# Patient Record
Sex: MALE | Race: WHITE | Employment: OTHER | URBAN - METROPOLITAN AREA
[De-identification: names, ages, dates, MRNs, and addresses within clinical notes are randomized per-mention and may not be internally consistent; named-entity substitution may affect disease eponyms.]

---

## 2017-03-27 ENCOUNTER — HOSPITAL ENCOUNTER (OUTPATIENT)
Dept: GENERAL RADIOLOGY | Age: 62
Discharge: HOME | End: 2017-03-27
Payer: COMMERCIAL

## 2017-03-27 DIAGNOSIS — J98.4 RESTRICTIVE LUNG DISEASE: ICD-10-CM

## 2017-03-27 PROCEDURE — 71020 XR CHEST PA LAT: CPT

## 2018-04-12 ENCOUNTER — APPOINTMENT (OUTPATIENT)
Dept: CT IMAGING | Age: 63
End: 2018-04-12
Attending: STUDENT IN AN ORGANIZED HEALTH CARE EDUCATION/TRAINING PROGRAM
Payer: COMMERCIAL

## 2018-04-12 ENCOUNTER — HOSPITAL ENCOUNTER (EMERGENCY)
Age: 63
Discharge: HOME | End: 2018-04-12
Attending: STUDENT IN AN ORGANIZED HEALTH CARE EDUCATION/TRAINING PROGRAM
Payer: COMMERCIAL

## 2018-04-12 ENCOUNTER — APPOINTMENT (OUTPATIENT)
Dept: GENERAL RADIOLOGY | Age: 63
End: 2018-04-12
Attending: STUDENT IN AN ORGANIZED HEALTH CARE EDUCATION/TRAINING PROGRAM
Payer: COMMERCIAL

## 2018-04-12 VITALS
HEIGHT: 73 IN | RESPIRATION RATE: 17 BRPM | BODY MASS INDEX: 32.6 KG/M2 | TEMPERATURE: 97.8 F | WEIGHT: 246 LBS | SYSTOLIC BLOOD PRESSURE: 142 MMHG | HEART RATE: 69 BPM | DIASTOLIC BLOOD PRESSURE: 76 MMHG | OXYGEN SATURATION: 94 %

## 2018-04-12 DIAGNOSIS — R07.89 ATYPICAL CHEST PAIN: Primary | ICD-10-CM

## 2018-04-12 LAB
ALBUMIN SERPL-MCNC: 3.9 G/DL (ref 3.2–4.6)
ALBUMIN/GLOB SERPL: 1.2 {RATIO} (ref 1.2–3.5)
ALP SERPL-CCNC: 71 U/L (ref 50–136)
ALT SERPL-CCNC: 31 U/L (ref 12–65)
ANION GAP SERPL CALC-SCNC: 8 MMOL/L (ref 7–16)
AST SERPL-CCNC: 18 U/L (ref 15–37)
ATRIAL RATE: 69 BPM
ATRIAL RATE: 72 BPM
BASOPHILS # BLD: 0 K/UL (ref 0–0.2)
BASOPHILS NFR BLD: 0 % (ref 0–2)
BILIRUB SERPL-MCNC: 0.8 MG/DL (ref 0.2–1.1)
BNP SERPL-MCNC: 24 PG/ML
BUN SERPL-MCNC: 19 MG/DL (ref 8–23)
CALCIUM SERPL-MCNC: 8.4 MG/DL (ref 8.3–10.4)
CALCULATED P AXIS, ECG09: 58 DEGREES
CALCULATED P AXIS, ECG09: 68 DEGREES
CALCULATED R AXIS, ECG10: -18 DEGREES
CALCULATED R AXIS, ECG10: -5 DEGREES
CALCULATED T AXIS, ECG11: 45 DEGREES
CALCULATED T AXIS, ECG11: 50 DEGREES
CHLORIDE SERPL-SCNC: 106 MMOL/L (ref 98–107)
CO2 SERPL-SCNC: 25 MMOL/L (ref 21–32)
CREAT SERPL-MCNC: 1.16 MG/DL (ref 0.8–1.5)
D DIMER PPP FEU-MCNC: 0.56 UG/ML(FEU)
DIAGNOSIS, 93000: NORMAL
DIAGNOSIS, 93000: NORMAL
DIFFERENTIAL METHOD BLD: ABNORMAL
EOSINOPHIL # BLD: 0.2 K/UL (ref 0–0.8)
EOSINOPHIL NFR BLD: 3 % (ref 0.5–7.8)
ERYTHROCYTE [DISTWIDTH] IN BLOOD BY AUTOMATED COUNT: 13.6 % (ref 11.9–14.6)
GLOBULIN SER CALC-MCNC: 3.3 G/DL (ref 2.3–3.5)
GLUCOSE SERPL-MCNC: 149 MG/DL (ref 65–100)
HCT VFR BLD AUTO: 49 % (ref 41.1–50.3)
HGB BLD-MCNC: 17 G/DL (ref 13.6–17.2)
IMM GRANULOCYTES # BLD: 0 K/UL (ref 0–0.5)
IMM GRANULOCYTES NFR BLD AUTO: 0 % (ref 0–5)
LYMPHOCYTES # BLD: 2.6 K/UL (ref 0.5–4.6)
LYMPHOCYTES NFR BLD: 39 % (ref 13–44)
MCH RBC QN AUTO: 30.9 PG (ref 26.1–32.9)
MCHC RBC AUTO-ENTMCNC: 34.7 G/DL (ref 31.4–35)
MCV RBC AUTO: 89.1 FL (ref 79.6–97.8)
MONOCYTES # BLD: 0.4 K/UL (ref 0.1–1.3)
MONOCYTES NFR BLD: 6 % (ref 4–12)
NEUTS SEG # BLD: 3.4 K/UL (ref 1.7–8.2)
NEUTS SEG NFR BLD: 52 % (ref 43–78)
P-R INTERVAL, ECG05: 192 MS
P-R INTERVAL, ECG05: 208 MS
PLATELET # BLD AUTO: 202 K/UL (ref 150–450)
PMV BLD AUTO: 9.7 FL (ref 10.8–14.1)
POTASSIUM SERPL-SCNC: 4.1 MMOL/L (ref 3.5–5.1)
PROT SERPL-MCNC: 7.2 G/DL (ref 6.3–8.2)
Q-T INTERVAL, ECG07: 402 MS
Q-T INTERVAL, ECG07: 414 MS
QRS DURATION, ECG06: 102 MS
QRS DURATION, ECG06: 88 MS
QTC CALCULATION (BEZET), ECG08: 440 MS
QTC CALCULATION (BEZET), ECG08: 443 MS
RBC # BLD AUTO: 5.5 M/UL (ref 4.23–5.67)
SODIUM SERPL-SCNC: 139 MMOL/L (ref 136–145)
TROPONIN I BLD-MCNC: 0 NG/ML (ref 0.02–0.05)
TROPONIN I BLD-MCNC: 0 NG/ML (ref 0.02–0.05)
VENTRICULAR RATE, ECG03: 69 BPM
VENTRICULAR RATE, ECG03: 72 BPM
WBC # BLD AUTO: 6.7 K/UL (ref 4.3–11.1)

## 2018-04-12 PROCEDURE — 71046 X-RAY EXAM CHEST 2 VIEWS: CPT

## 2018-04-12 PROCEDURE — 83880 ASSAY OF NATRIURETIC PEPTIDE: CPT | Performed by: STUDENT IN AN ORGANIZED HEALTH CARE EDUCATION/TRAINING PROGRAM

## 2018-04-12 PROCEDURE — 85379 FIBRIN DEGRADATION QUANT: CPT | Performed by: STUDENT IN AN ORGANIZED HEALTH CARE EDUCATION/TRAINING PROGRAM

## 2018-04-12 PROCEDURE — 80053 COMPREHEN METABOLIC PANEL: CPT | Performed by: STUDENT IN AN ORGANIZED HEALTH CARE EDUCATION/TRAINING PROGRAM

## 2018-04-12 PROCEDURE — 85025 COMPLETE CBC W/AUTO DIFF WBC: CPT | Performed by: STUDENT IN AN ORGANIZED HEALTH CARE EDUCATION/TRAINING PROGRAM

## 2018-04-12 PROCEDURE — 84484 ASSAY OF TROPONIN QUANT: CPT

## 2018-04-12 PROCEDURE — 93005 ELECTROCARDIOGRAM TRACING: CPT | Performed by: STUDENT IN AN ORGANIZED HEALTH CARE EDUCATION/TRAINING PROGRAM

## 2018-04-12 PROCEDURE — 74011250637 HC RX REV CODE- 250/637: Performed by: STUDENT IN AN ORGANIZED HEALTH CARE EDUCATION/TRAINING PROGRAM

## 2018-04-12 PROCEDURE — 99285 EMERGENCY DEPT VISIT HI MDM: CPT | Performed by: STUDENT IN AN ORGANIZED HEALTH CARE EDUCATION/TRAINING PROGRAM

## 2018-04-12 RX ORDER — GUAIFENESIN 100 MG/5ML
324 LIQUID (ML) ORAL
Status: COMPLETED | OUTPATIENT
Start: 2018-04-12 | End: 2018-04-12

## 2018-04-12 RX ADMIN — ASPIRIN 81 MG 324 MG: 81 TABLET ORAL at 10:02

## 2018-04-12 NOTE — ED PROVIDER NOTES
HPI Comments: 56-year-old male patient presents from a primary care physician's office with reports of substernal chest pain that started this morning. Patient states he was lying in bed when he noted a dull aching pain at the substernal region of his chest, nonradiating and lasting approximately 30 minutes. Patient states this pain resolved on its own. He reports associated shortness of breath that has been present for several weeks. Patient states his shortness of breath is worsened with exertion. He denies nausea, vomiting or diaphoresis. Patient denies similar symptoms in the past.  He reports a history of \"borderline diabetes\". He denies history of hypertension, hyperlipidemia, previously diagnosed cardiac disease or tobacco use. Primary care provider states EKG performed office was unremarkable. Patient is pain-free at this time and during his previous evaluation this morning. Patient is a 57 y.o. male presenting with chest pain. The history is provided by the patient. Chest Pain    This is a new problem. The current episode started 3 to 5 hours ago. The problem has been resolved. The problem occurs constantly. The pain is associated with normal activity. The pain is present in the substernal region. The pain is mild. The quality of the pain is described as pressure-like. The pain does not radiate. Associated symptoms include shortness of breath. Pertinent negatives include no abdominal pain, no back pain, no cough, no diaphoresis, no dizziness, no fever, no headaches, no nausea, no numbness, no vomiting and no weakness. He has tried nothing for the symptoms. Risk factors include male gender and diabetes mellitus.  Procedural history includes exercise treadmill test.       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 Social History Main Topics    Smoking status: Former Smoker     Packs/day: 0.50     Years: 30.00     Types: Cigarettes     Quit date: 1993    Smokeless tobacco: Former User    Alcohol use No    Drug use: No    Sexual activity: Not on file     Other Topics Concern    Not on file     Social History Narrative         ALLERGIES: Review of patient's allergies indicates no known allergies. Review of Systems   Constitutional: Negative for chills, diaphoresis and fever. HENT: Negative for congestion, sneezing and sore throat. Eyes: Negative for visual disturbance. Respiratory: Positive for shortness of breath. Negative for cough, chest tightness and wheezing. Cardiovascular: Positive for chest pain. Negative for leg swelling. Gastrointestinal: Negative for abdominal pain, blood in stool, diarrhea, nausea and vomiting. Endocrine: Negative for polyuria. Genitourinary: Negative for difficulty urinating, dysuria, flank pain, hematuria and urgency. Musculoskeletal: Negative for back pain, myalgias, neck pain and neck stiffness. Skin: Negative for color change and rash. Neurological: Negative for dizziness, syncope, speech difficulty, weakness, light-headedness, numbness and headaches. Psychiatric/Behavioral: Negative for behavioral problems. All other systems reviewed and are negative.       Vitals:    04/12/18 0943   BP: 152/82   Pulse: 72   Resp: 18   Temp: 97.8 °F (36.6 °C)   SpO2: 96%   Weight: 111.6 kg (246 lb)   Height: 6' 1\" (1.854 m) Physical Exam   Constitutional: He is oriented to person, place, and time. He appears well-developed and well-nourished. No distress. Alert and oriented to person, place and time. No acute distress. Speaks in clear, fluent sentences. HENT:   Head: Normocephalic and atraumatic. Right Ear: External ear normal.   Left Ear: External ear normal.   Nose: Nose normal.   Mouth/Throat: Oropharynx is clear and moist.   Eyes: Conjunctivae and EOM are normal. Pupils are equal, round, and reactive to light. Neck: Normal range of motion. Neck supple. No JVD present. No tracheal deviation present. Cardiovascular: Normal rate, regular rhythm, S1 normal, S2 normal, normal heart sounds and intact distal pulses. Exam reveals no gallop, no distant heart sounds and no friction rub. No murmur heard. Pulmonary/Chest: Effort normal and breath sounds normal. No accessory muscle usage or stridor. No tachypnea and no bradypnea. No respiratory distress. He has no decreased breath sounds. He has no wheezes. He has no rhonchi. He has no rales. He exhibits no tenderness. Abdominal: Soft. Normal appearance. He exhibits no distension and no mass. There is no hepatosplenomegaly, splenomegaly or hepatomegaly. There is no tenderness. There is no rigidity, no rebound, no guarding, no CVA tenderness, no tenderness at McBurney's point and negative Perkins's sign. Musculoskeletal: Normal range of motion. He exhibits no edema, tenderness or deformity. Neurological: He is alert and oriented to person, place, and time. No cranial nerve deficit. Skin: Skin is warm and dry. No rash noted. He is not diaphoretic. Psychiatric: He has a normal mood and affect. His behavior is normal.   Nursing note and vitals reviewed. MDM  Number of Diagnoses or Management Options  Diagnosis management comments: Patient is pain-free on arrival  EKG obtained on arrival shows a normal sinus rhythm with a rate of 72 beats a minute.   No evidence of acute ischemia. Low suspicion for PE, DDI of .56, adjusted DDI makes this normal level for patient, I do not feel he requires CT imaging at this time.         Amount and/or Complexity of Data Reviewed  Clinical lab tests: ordered and reviewed  Tests in the radiology section of CPT®: reviewed and ordered  Tests in the medicine section of CPT®: reviewed and ordered  Independent visualization of images, tracings, or specimens: yes    Risk of Complications, Morbidity, and/or Mortality  Presenting problems: moderate  Diagnostic procedures: low  Management options: moderate    Patient Progress  Patient progress: stable        ED Course       Procedures

## 2018-04-12 NOTE — ED NOTES
I have reviewed discharge instructions with the patient. The patient verbalized understanding. Patient left ED via Discharge Method: ambulatory to Home with self. Opportunity for questions and clarification provided. Patient given 0 scripts. To continue your aftercare when you leave the hospital, you may receive an automated call from our care team to check in on how you are doing. This is a free service and part of our promise to provide the best care and service to meet your aftercare needs.  If you have questions, or wish to unsubscribe from this service please call [phone]. Thank you for Choosing our [facility].

## 2018-04-12 NOTE — DISCHARGE INSTRUCTIONS
Chest Pain: Care Instructions  Your Care Instructions    There are many things that can cause chest pain. Some are not serious and will get better on their own in a few days. But some kinds of chest pain need more testing and treatment. Your doctor may have recommended a follow-up visit in the next 8 to 12 hours. If you are not getting better, you may need more tests or treatment. Even though your doctor has released you, you still need to watch for any problems. The doctor carefully checked you, but sometimes problems can develop later. If you have new symptoms or if your symptoms do not get better, get medical care right away. If you have worse or different chest pain or pressure that lasts more than 5 minutes or you passed out (lost consciousness), call 911 or seek other emergency help right away. A medical visit is only one step in your treatment. Even if you feel better, you still need to do what your doctor recommends, such as going to all suggested follow-up appointments and taking medicines exactly as directed. This will help you recover and help prevent future problems. How can you care for yourself at home? · Rest until you feel better. · Take your medicine exactly as prescribed. Call your doctor if you think you are having a problem with your medicine. · Do not drive after taking a prescription pain medicine. When should you call for help? Call 911 if:  ? · You passed out (lost consciousness). ? · You have severe difficulty breathing. ? · You have symptoms of a heart attack. These may include:  ¨ Chest pain or pressure, or a strange feeling in your chest.  ¨ Sweating. ¨ Shortness of breath. ¨ Nausea or vomiting. ¨ Pain, pressure, or a strange feeling in your back, neck, jaw, or upper belly or in one or both shoulders or arms. ¨ Lightheadedness or sudden weakness. ¨ A fast or irregular heartbeat.   After you call 911, the  may tell you to chew 1 adult-strength or 2 to 4 low-dose aspirin. Wait for an ambulance. Do not try to drive yourself. ?Call your doctor today if:  ? · You have any trouble breathing. ? · Your chest pain gets worse. ? · You are dizzy or lightheaded, or you feel like you may faint. ? · You are not getting better as expected. ? · You are having new or different chest pain. Where can you learn more? Go to http://gonzalo-stephan.info/. Enter A120 in the search box to learn more about \"Chest Pain: Care Instructions. \"  Current as of: March 20, 2017  Content Version: 11.4  © 3569-5003 [employer]. Care instructions adapted under license by [employer] (which disclaims liability or warranty for this information). If you have questions about a medical condition or this instruction, always ask your healthcare professional. [address] any warranty or liability for your use of this information.

## 2018-04-12 NOTE — ED TRIAGE NOTES
Pt states having pressure on his chest this morning. For the past couple of months he has been short of breath and has no energy. Pt states the pressure comes and goes. Pt was seen at his pcp today.

## 2018-04-20 ENCOUNTER — HOSPITAL ENCOUNTER (OUTPATIENT)
Dept: LAB | Age: 63
Discharge: HOME | End: 2018-04-20
Attending: INTERNAL MEDICINE
Payer: COMMERCIAL

## 2018-04-20 DIAGNOSIS — R06.09 DYSPNEA ON EXERTION: ICD-10-CM

## 2018-04-20 DIAGNOSIS — R07.2 PRECORDIAL PAIN: ICD-10-CM

## 2018-04-20 DIAGNOSIS — K21.9 GASTROESOPHAGEAL REFLUX DISEASE WITHOUT ESOPHAGITIS: ICD-10-CM

## 2018-04-20 DIAGNOSIS — E78.2 MIXED HYPERLIPIDEMIA: ICD-10-CM

## 2018-04-20 LAB
ALBUMIN SERPL-MCNC: 3.5 G/DL (ref 3.2–4.6)
ALBUMIN/GLOB SERPL: 1.1 {RATIO}
ALP SERPL-CCNC: 64 U/L (ref 50–136)
ALT SERPL-CCNC: 20 U/L (ref 12–65)
ANION GAP SERPL CALC-SCNC: 10 MMOL/L
AST SERPL-CCNC: 13 U/L (ref 15–37)
BASOPHILS # BLD: 0.1 K/UL (ref 0–0.2)
BASOPHILS NFR BLD: 1 % (ref 0–2)
BILIRUB SERPL-MCNC: 0.7 MG/DL (ref 0.2–1.1)
BUN SERPL-MCNC: 16 MG/DL (ref 8–23)
CALCIUM SERPL-MCNC: 8.1 MG/DL (ref 8.3–10.4)
CHLORIDE SERPL-SCNC: 105 MMOL/L (ref 98–107)
CHOLEST SERPL-MCNC: 159 MG/DL
CO2 SERPL-SCNC: 24 MMOL/L (ref 21–32)
CREAT SERPL-MCNC: 1.4 MG/DL (ref 0.8–1.5)
DIFFERENTIAL METHOD BLD: ABNORMAL
EOSINOPHIL # BLD: 0.2 K/UL (ref 0–0.8)
EOSINOPHIL NFR BLD: 3 % (ref 0.5–7.8)
ERYTHROCYTE [DISTWIDTH] IN BLOOD BY AUTOMATED COUNT: 13.7 % (ref 11.9–14.6)
GLOBULIN SER CALC-MCNC: 3.2 G/DL
GLUCOSE SERPL-MCNC: 260 MG/DL (ref 65–100)
HCT VFR BLD AUTO: 46.4 % (ref 41.1–50.3)
HDLC SERPL-MCNC: 21 MG/DL (ref 40–60)
HDLC SERPL: 7.6 {RATIO}
HGB BLD-MCNC: 16.3 G/DL (ref 13.6–17.2)
LDLC SERPL CALC-MCNC: 104.6 MG/DL
LIPID PROFILE,FLP: ABNORMAL
LYMPHOCYTES # BLD: 1.8 K/UL (ref 0.5–4.6)
LYMPHOCYTES NFR BLD: 30 % (ref 13–44)
MCH RBC QN AUTO: 31 PG (ref 26.1–32.9)
MCHC RBC AUTO-ENTMCNC: 35.1 G/DL (ref 31.4–35)
MCV RBC AUTO: 88.4 FL (ref 79.6–97.8)
MONOCYTES # BLD: 0.5 K/UL (ref 0.1–1.3)
MONOCYTES NFR BLD: 8 % (ref 4–12)
NEUTS SEG # BLD: 3.7 K/UL (ref 1.7–8.2)
NEUTS SEG NFR BLD: 58 % (ref 43–78)
PLATELET # BLD AUTO: 197 K/UL (ref 150–450)
PMV BLD AUTO: 9.3 FL (ref 10.8–14.1)
POTASSIUM SERPL-SCNC: 3.7 MMOL/L (ref 3.5–5.1)
PROT SERPL-MCNC: 6.7 G/DL (ref 6.3–8.2)
RBC # BLD AUTO: 5.25 M/UL (ref 4.23–5.67)
SODIUM SERPL-SCNC: 139 MMOL/L (ref 136–145)
TRIGL SERPL-MCNC: 167 MG/DL (ref 35–150)
TSH SERPL DL<=0.005 MIU/L-ACNC: 0.48 UIU/ML (ref 0.36–3.74)
VLDLC SERPL CALC-MCNC: 33.4 MG/DL (ref 6–23)
WBC # BLD AUTO: 6.2 K/UL (ref 4.3–11.1)

## 2018-04-20 PROCEDURE — 36415 COLL VENOUS BLD VENIPUNCTURE: CPT | Performed by: INTERNAL MEDICINE

## 2018-04-20 PROCEDURE — 80053 COMPREHEN METABOLIC PANEL: CPT | Performed by: INTERNAL MEDICINE

## 2018-04-20 PROCEDURE — 84443 ASSAY THYROID STIM HORMONE: CPT | Performed by: INTERNAL MEDICINE

## 2018-04-20 PROCEDURE — 80061 LIPID PANEL: CPT | Performed by: INTERNAL MEDICINE

## 2018-04-20 PROCEDURE — 85025 COMPLETE CBC W/AUTO DIFF WBC: CPT | Performed by: INTERNAL MEDICINE

## 2018-04-20 NOTE — PROGRESS NOTES
Called to pre-assess for [facility] poss with Dr Troy Lyon , Scheduled 4/24/18. No answer & message left.

## 2018-04-24 ENCOUNTER — HOSPITAL ENCOUNTER (OUTPATIENT)
Dept: CARDIAC CATH/INVASIVE PROCEDURES | Age: 63
Discharge: HOME | End: 2018-04-25
Attending: INTERNAL MEDICINE | Admitting: INTERNAL MEDICINE
Payer: COMMERCIAL

## 2018-04-24 PROBLEM — I25.10 CAD (CORONARY ARTERY DISEASE): Status: ACTIVE | Noted: 2018-04-24

## 2018-04-24 LAB
ANION GAP SERPL CALC-SCNC: 7 MMOL/L (ref 7–16)
ATRIAL RATE: 73 BPM
ATRIAL RATE: 73 BPM
BUN SERPL-MCNC: 14 MG/DL (ref 8–23)
CALCIUM SERPL-MCNC: 8.4 MG/DL (ref 8.3–10.4)
CALCULATED P AXIS, ECG09: 60 DEGREES
CALCULATED P AXIS, ECG09: 65 DEGREES
CALCULATED R AXIS, ECG10: -10 DEGREES
CALCULATED R AXIS, ECG10: 3 DEGREES
CALCULATED T AXIS, ECG11: 58 DEGREES
CALCULATED T AXIS, ECG11: 59 DEGREES
CHLORIDE SERPL-SCNC: 109 MMOL/L (ref 98–107)
CO2 SERPL-SCNC: 26 MMOL/L (ref 21–32)
CREAT SERPL-MCNC: 1.31 MG/DL (ref 0.8–1.5)
DIAGNOSIS, 93000: NORMAL
DIAGNOSIS, 93000: NORMAL
GLUCOSE SERPL-MCNC: 158 MG/DL (ref 65–100)
P-R INTERVAL, ECG05: 202 MS
P-R INTERVAL, ECG05: 202 MS
POTASSIUM SERPL-SCNC: 4.2 MMOL/L (ref 3.5–5.1)
Q-T INTERVAL, ECG07: 410 MS
Q-T INTERVAL, ECG07: 414 MS
QRS DURATION, ECG06: 96 MS
QRS DURATION, ECG06: 98 MS
QTC CALCULATION (BEZET), ECG08: 451 MS
QTC CALCULATION (BEZET), ECG08: 456 MS
SODIUM SERPL-SCNC: 142 MMOL/L (ref 136–145)
VENTRICULAR RATE, ECG03: 73 BPM
VENTRICULAR RATE, ECG03: 73 BPM

## 2018-04-24 PROCEDURE — 77030015766

## 2018-04-24 PROCEDURE — 74011250636 HC RX REV CODE- 250/636

## 2018-04-24 PROCEDURE — 77030019569 HC BND COMPR RAD TERU -B

## 2018-04-24 PROCEDURE — C1887 CATHETER, GUIDING: HCPCS

## 2018-04-24 PROCEDURE — C1874 STENT, COATED/COV W/DEL SYS: HCPCS

## 2018-04-24 PROCEDURE — 74011000250 HC RX REV CODE- 250: Performed by: INTERNAL MEDICINE

## 2018-04-24 PROCEDURE — 99153 MOD SED SAME PHYS/QHP EA: CPT

## 2018-04-24 PROCEDURE — 93458 L HRT ARTERY/VENTRICLE ANGIO: CPT

## 2018-04-24 PROCEDURE — 74011250636 HC RX REV CODE- 250/636: Performed by: INTERNAL MEDICINE

## 2018-04-24 PROCEDURE — 74011000258 HC RX REV CODE- 258: Performed by: INTERNAL MEDICINE

## 2018-04-24 PROCEDURE — C1769 GUIDE WIRE: HCPCS

## 2018-04-24 PROCEDURE — 92928 PRQ TCAT PLMT NTRAC ST 1 LES: CPT

## 2018-04-24 PROCEDURE — 99152 MOD SED SAME PHYS/QHP 5/>YRS: CPT

## 2018-04-24 PROCEDURE — C1894 INTRO/SHEATH, NON-LASER: HCPCS

## 2018-04-24 PROCEDURE — 74011250637 HC RX REV CODE- 250/637: Performed by: INTERNAL MEDICINE

## 2018-04-24 PROCEDURE — 77030012468 HC VLV BLEEDBK CNTRL ABBT -B

## 2018-04-24 PROCEDURE — 93005 ELECTROCARDIOGRAM TRACING: CPT | Performed by: INTERNAL MEDICINE

## 2018-04-24 PROCEDURE — 74011636320 HC RX REV CODE- 636/320: Performed by: INTERNAL MEDICINE

## 2018-04-24 PROCEDURE — 77030004534 HC CATH ANGI DX INFN CARD -A

## 2018-04-24 PROCEDURE — 80048 BASIC METABOLIC PNL TOTAL CA: CPT | Performed by: INTERNAL MEDICINE

## 2018-04-24 RX ORDER — LIDOCAINE HYDROCHLORIDE 20 MG/ML
1-10 INJECTION, SOLUTION INFILTRATION; PERINEURAL
Status: DISCONTINUED | OUTPATIENT
Start: 2018-04-24 | End: 2018-04-24 | Stop reason: HOSPADM

## 2018-04-24 RX ORDER — SODIUM CHLORIDE 0.9 % (FLUSH) 0.9 %
5-10 SYRINGE (ML) INJECTION EVERY 8 HOURS
Status: DISCONTINUED | OUTPATIENT
Start: 2018-04-24 | End: 2018-04-25 | Stop reason: HOSPADM

## 2018-04-24 RX ORDER — SODIUM CHLORIDE 9 MG/ML
75 INJECTION, SOLUTION INTRAVENOUS CONTINUOUS
Status: DISCONTINUED | OUTPATIENT
Start: 2018-04-24 | End: 2018-04-25 | Stop reason: HOSPADM

## 2018-04-24 RX ORDER — GUAIFENESIN 100 MG/5ML
81-324 LIQUID (ML) ORAL ONCE
Status: ACTIVE | OUTPATIENT
Start: 2018-04-24 | End: 2018-04-24

## 2018-04-24 RX ORDER — ASPIRIN 325 MG
325 TABLET ORAL DAILY
Status: ON HOLD | COMMUNITY
Start: 2018-04-24 | End: 2018-04-24 | Stop reason: ALTCHOICE

## 2018-04-24 RX ORDER — SODIUM CHLORIDE 9 MG/ML
3 INJECTION, SOLUTION INTRAVENOUS ONCE
Status: COMPLETED | OUTPATIENT
Start: 2018-04-24 | End: 2018-04-24

## 2018-04-24 RX ORDER — HEPARIN SODIUM 200 [USP'U]/100ML
3 INJECTION, SOLUTION INTRAVENOUS CONTINUOUS
Status: DISCONTINUED | OUTPATIENT
Start: 2018-04-24 | End: 2018-04-25 | Stop reason: HOSPADM

## 2018-04-24 RX ORDER — FENTANYL CITRATE 50 UG/ML
25-200 INJECTION, SOLUTION INTRAMUSCULAR; INTRAVENOUS
Status: DISCONTINUED | OUTPATIENT
Start: 2018-04-24 | End: 2018-04-24 | Stop reason: HOSPADM

## 2018-04-24 RX ORDER — MIDAZOLAM HYDROCHLORIDE 1 MG/ML
.5-5 INJECTION, SOLUTION INTRAMUSCULAR; INTRAVENOUS
Status: DISCONTINUED | OUTPATIENT
Start: 2018-04-24 | End: 2018-04-24 | Stop reason: HOSPADM

## 2018-04-24 RX ORDER — ROSUVASTATIN CALCIUM 20 MG/1
20 TABLET, COATED ORAL DAILY
Status: DISCONTINUED | OUTPATIENT
Start: 2018-04-24 | End: 2018-04-25 | Stop reason: HOSPADM

## 2018-04-24 RX ORDER — SODIUM CHLORIDE 9 MG/ML
1 INJECTION, SOLUTION INTRAVENOUS AS NEEDED
Status: DISPENSED | OUTPATIENT
Start: 2018-04-24 | End: 2018-04-25

## 2018-04-24 RX ORDER — GUAIFENESIN 100 MG/5ML
81 LIQUID (ML) ORAL DAILY
Status: DISCONTINUED | OUTPATIENT
Start: 2018-04-24 | End: 2018-04-25 | Stop reason: HOSPADM

## 2018-04-24 RX ORDER — SODIUM CHLORIDE 0.9 % (FLUSH) 0.9 %
5-10 SYRINGE (ML) INJECTION AS NEEDED
Status: DISCONTINUED | OUTPATIENT
Start: 2018-04-24 | End: 2018-04-25 | Stop reason: HOSPADM

## 2018-04-24 RX ORDER — DIAZEPAM 5 MG/1
5 TABLET ORAL ONCE
Status: DISCONTINUED | OUTPATIENT
Start: 2018-04-24 | End: 2018-04-24 | Stop reason: HOSPADM

## 2018-04-24 RX ADMIN — NITROGLYCERIN 0.4 MG: 200 INJECTION, SOLUTION INTRAVENOUS at 08:32

## 2018-04-24 RX ADMIN — BIVALIRUDIN 1.75 MG/KG/HR: 250 INJECTION, POWDER, LYOPHILIZED, FOR SOLUTION INTRAVENOUS at 08:32

## 2018-04-24 RX ADMIN — Medication 10 ML: at 15:19

## 2018-04-24 RX ADMIN — SODIUM CHLORIDE 3 ML/KG/HR: 900 INJECTION, SOLUTION INTRAVENOUS at 06:45

## 2018-04-24 RX ADMIN — HEPARIN SODIUM 3 ML/HR: 5000 INJECTION, SOLUTION INTRAVENOUS; SUBCUTANEOUS at 07:57

## 2018-04-24 RX ADMIN — Medication 5 ML: at 21:22

## 2018-04-24 RX ADMIN — MIDAZOLAM HYDROCHLORIDE 2 MG: 1 INJECTION, SOLUTION INTRAMUSCULAR; INTRAVENOUS at 08:13

## 2018-04-24 RX ADMIN — ROSUVASTATIN CALCIUM 20 MG: 20 TABLET, FILM COATED ORAL at 15:17

## 2018-04-24 RX ADMIN — TICAGRELOR 90 MG: 90 TABLET ORAL at 21:22

## 2018-04-24 RX ADMIN — VERAPAMIL HYDROCHLORIDE 2 ML: 2.5 INJECTION INTRAVENOUS at 07:56

## 2018-04-24 RX ADMIN — SODIUM CHLORIDE 75 ML/HR: 900 INJECTION, SOLUTION INTRAVENOUS at 09:00

## 2018-04-24 RX ADMIN — SODIUM CHLORIDE 75 ML/HR: 900 INJECTION, SOLUTION INTRAVENOUS at 07:05

## 2018-04-24 RX ADMIN — TICAGRELOR 180 MG: 90 TABLET ORAL at 08:51

## 2018-04-24 RX ADMIN — IOPAMIDOL 260 ML: 755 INJECTION, SOLUTION INTRAVENOUS at 08:49

## 2018-04-24 NOTE — PROGRESS NOTES
Bedside and Verbal shift change report given to self (oncoming nurse) by Alberto Wilson RN (offgoing nurse). Report included the following information SBAR, Kardex, MAR and Recent Results. R radial cath site visualized & c/d/i, no bleeding or hematoma.

## 2018-04-24 NOTE — PROGRESS NOTES
Patient received to [address] [place] [address] room # 9  Ambulatory from [facility]. Patient scheduled for [facility] today with Dr Bertha Sparrow. Procedure reviewed & questions answered, voiced good understanding consent obtained & placed on chart. All medications and medical history reviewed. Will prep patient per orders. Patient & family updated on plan of care. The patient has a fraility score of 3-MANAGING WELL, based on pt being able to perform general ADL's but does have episode of SOB on exertion. Pt states he took his own 325 mg of ASA at 0530 today.

## 2018-04-24 NOTE — PROGRESS NOTES
Report received from Nia Hopkins. Procedural findings communicated. Intra procedural  medication administration reviewed. Progression of care discussed.      Patient received into [id number] [facility] 7 post sheath removal.     Access site without bleeding or swelling yes    Dressing dry and intact yes    Patient instructed to limit movement to right upper extremity    Routine post procedural vital signs and site assessment initiated yes

## 2018-04-24 NOTE — PROGRESS NOTES
Bedside shift change report given to [address] (oncoming nurse) by self Kellen cabrales). Report included the following information SBAR.

## 2018-04-24 NOTE — PROCEDURES
Cath    lvef 60  Lm ok  Lad long 90 prox lesion  circ mid 90%  rca mid 50%    Stent x2 to lad and stent x1 to circ with resolute magdy

## 2018-04-24 NOTE — PROGRESS NOTES
TRANSFER - OUT REPORT:    Verbal report given to Moira KIM(name) on Yojana Delvalle  being transferred to CPRU(unit) for routine progression of care       Report consisted of patients Situation, Background, Assessment and   Recommendations(SBAR). Information from the following report(s) SBAR and Procedure Summary was reviewed with the receiving nurse. Opportunity for questions and clarification was provided. Procedure: LHC/PCI   Finding Summary: see mac lab report(cath/pci/pacer settings)  Location: RRA    Closure Device: TR band with 16 ml of air at 0850(yes/no/description)  Post Site Assessment: no oozing or hematoma, verified with Lang Grippe RTR     Pre Procedure Meds:(if any)    ASA: 324 mg  When Received:  0530  Antiplatelet: n/a    Intra Procedure Meds:    Versed: 2 mg  Heparin: 2000 units  Angiomax Stop Time: 3183  Antiplatelet: 291 mg Brilinta             Peripheral IV 04/24/18 Right Antecubital (Active)       Peripheral IV 04/24/18 Left Forearm (Active)        Post-Procedure Site Assessment (1)  Wound Type: Catheter entry/exit  Location: Radial  Orientation : Right  Closure Device:  (TR band with 16 ml of air at 0850)  Site Assessment: Dry/intact                       has No Known Allergies.     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

## 2018-04-24 NOTE — IP AVS SNAPSHOT
[address] 
[phone] Patient: Ashley Delvalle MRN: [id number] RGK:[birth year] A check ashly indicates which time of day the medication should be taken. My Medications START taking these medications Instructions Each Dose to Equal  
 Morning Noon Evening Bedtime  
 aspirin 81 mg chewable tablet Replaces:  aspirin 325 mg tablet Your last dose was:  4/25/2018 Take 1 Tab by mouth daily. 81 mg  
    
  
   
   
   
  
 metoprolol succinate 25 mg XL tablet Commonly known as:  TOPROL-XL Take 1 Tab by mouth daily. 25 mg  
    
  
   
   
   
  
 nitroglycerin 0.4 mg SL tablet Commonly known as:  NITROSTAT  
   
 1 Tab by SubLINGual route every five (5) minutes as needed for Chest Pain. 0.4 mg  
    
   
   
   
  
 rosuvastatin 20 mg tablet Commonly known as:  CRESTOR Your last dose was:  4/25/2018 Take 1 Tab by mouth daily. 20 mg  
    
  
   
   
   
  
 ticagrelor 90 mg tablet Commonly known as:  [employer] Your last dose was:  4/25/2018 Take 1 Tab by mouth every twelve (12) hours every twelve (12) hours. 90 mg CHANGE how you take these medications Instructions Each Dose to Equal  
 Morning Noon Evening Bedtime  
 fluticasone-vilanterol 200-25 mcg/dose inhaler Commonly known as:  BREO ELLIPTA What changed:   
- when to take this 
- reasons to take this Take 1 Puff by inhalation daily. Indications: MAINTENANCE THERAPY FOR ASTHMA  
 1 Puff  
    
  
   
   
   
  
 omeprazole 40 mg capsule Commonly known as:  PRILOSEC What changed:  additional instructions TAKE ONE CAPSULE BY MOUTH EVERY DAY  
     
  
   
   
   
  
  
CONTINUE taking these medications Instructions Each Dose to Equal  
 Morning Noon Evening Bedtime  
 glucose blood VI test strips strip Commonly known as:  Justin Nicole  
 Check BS fasting daily  DX. Diabetes Mellitus E11.9  
     
   
   
   
  
 lancets 30 gauge Misc Commonly known as:  Maria Teresa Meza LANCETS Testing blood sugars once daily  Dx: E11.9 STOP taking these medications   
 aspirin 325 mg tablet Commonly known as:  ASPIRIN Replaced by:  aspirin 81 mg chewable tablet Where to Get Your Medications Information on where to get these meds will be given to you by the nurse or doctor. ! Ask your nurse or doctor about these medications  
  aspirin 81 mg chewable tablet  
 metoprolol succinate 25 mg XL tablet  
 nitroglycerin 0.4 mg SL tablet  
 rosuvastatin 20 mg tablet  
 ticagrelor 90 mg tablet

## 2018-04-24 NOTE — PROGRESS NOTES
Applied TR-band to right wrist with 16 mL of air in band. Site without bleeding or hematoma. Capillary refill distal to the site was less than 2 seconds. Patient instructed to limit movement of affected wrist. Patient verbalized understanding.

## 2018-04-24 NOTE — PROGRESS NOTES
TRANSFER - IN REPORT:    Verbal report received from Moira on Maribell Delvalle being received from Cath Labfor routine progression of care. Report consisted of patients Situation, Background, Assessment and Recommendations(SBAR). Information from the following report(s) SBAR was reviewed. Opportunity for questions and clarification was provided. Assessment completed upon patients arrival to unit and care assumed. Patient received to room 305. Patient connected to monitor and assessment completed. Plan of care reviewed. Patient oriented to room and call light. Patient aware to use call light to communicate any chest pain or needs. Admission skin assessment completed with second RN and reveals the following: skin intact to include sacrum and heels bilaterally without redness or open areas except Right radial cath site. Right radial cath site is soft without bruising or hematoma. 4X4 gauze and Tegaderm intact.

## 2018-04-24 NOTE — IP AVS SNAPSHOT
[address] [facility] 
 
 
 [address] [facility] 
[phone] Patient: Nii Delvalle MRN: [id number] TUT:[id number][birth year]06[id number] About your hospitalization You were admitted on:  April 24, 2018 You last received care in the:  [facility] 3 TELEMETRY You were discharged on:  April 25, 2018 Why you were hospitalized Your primary diagnosis was:  Not on File Your diagnoses also included:  Cad (Coronary Artery Disease) Follow-up Information Follow up With Details Comments Contact Info [facility]  Please call to schedule your Cardiac Rehab orientation when you see your schedule allows. [address] [place] [facility] [address] [phone] Ventura Parham MD Schedule an appointment as soon as possible for a visit As needed for hospital follow up visit  [address] [place] [address] 
[phone] Mary Kate Pineda MD  May 16 @ 11:30 in [facility] [address] [facility] [address] 
[phone] Your Scheduled Appointments Wednesday May 16, 2018 11:30 AM EDT HOSPITAL FOLLOW-UP with Mary Kate Pineda MD  
[facility] ([address] [place] [address]) [address]  
[address] [facility] 81  
[phone] Discharge Orders Procedure Order Date Status Priority Quantity Spec Type Associated Dx REFERRAL TO CARDIAC [facility] 04/25/18 0806 Normal Routine 1 A check ashly indicates which time of day the medication should be taken. My Medications START taking these medications Instructions Each Dose to Equal  
 Morning Noon Evening Bedtime  
 aspirin 81 mg chewable tablet Replaces:  aspirin 325 mg tablet Your last dose was:  4/25/2018 Take 1 Tab by mouth daily. 81 mg  
    
  
   
   
   
  
 metoprolol succinate 25 mg XL tablet Commonly known as:  TOPROL-XL Take 1 Tab by mouth daily.   
 25 mg  
    
  
   
   
   
 nitroglycerin 0.4 mg SL tablet Commonly known as:  NITROSTAT  
   
 1 Tab by SubLINGual route every five (5) minutes as needed for Chest Pain. 0.4 mg  
    
   
   
   
  
 rosuvastatin 20 mg tablet Commonly known as:  CRESTOR Your last dose was:  4/25/2018 Take 1 Tab by mouth daily. 20 mg  
    
  
   
   
   
  
 ticagrelor 90 mg tablet Commonly known as:  [employer] Your last dose was:  4/25/2018 Take 1 Tab by mouth every twelve (12) hours every twelve (12) hours. 90 mg CHANGE how you take these medications Instructions Each Dose to Equal  
 Morning Noon Evening Bedtime  
 fluticasone-vilanterol 200-25 mcg/dose inhaler Commonly known as:  BREO ELLIPTA What changed:   
- when to take this 
- reasons to take this Take 1 Puff by inhalation daily. Indications: MAINTENANCE THERAPY FOR ASTHMA  
 1 Puff  
    
  
   
   
   
  
 omeprazole 40 mg capsule Commonly known as:  PRILOSEC What changed:  additional instructions TAKE ONE CAPSULE BY MOUTH EVERY DAY  
     
  
   
   
   
  
  
CONTINUE taking these medications Instructions Each Dose to Equal  
 Morning Noon Evening Bedtime  
 glucose blood VI test strips strip Commonly known as:  Daniel Nails Check BS fasting daily  DX. Diabetes Mellitus E11.9  
     
   
   
   
  
 lancets 30 gauge Misc Commonly known as:  Leelee Sargent LANCETS Testing blood sugars once daily  Dx: E11.9 STOP taking these medications   
 aspirin 325 mg tablet Commonly known as:  ASPIRIN Replaced by:  aspirin 81 mg chewable tablet Where to Get Your Medications Information on where to get these meds will be given to you by the nurse or doctor. ! Ask your nurse or doctor about these medications  
  aspirin 81 mg chewable tablet  
 metoprolol succinate 25 mg XL tablet  
 nitroglycerin 0.4 mg SL tablet rosuvastatin 20 mg tablet  
 ticagrelor 90 mg tablet Discharge Instructions Cardiac Catheterization/Angiography Discharge Instructions *Check the puncture site frequently for swelling or bleeding. If you see any bleeding, lie down and apply pressure over the area with a clean town or washcloth. Notify your doctor for any redness, swelling, drainage or oozing from the puncture site. Notify your doctor for any fever or chills. *If the leg or arm with the puncture becomes cold, numb or painful, call [facility] at [phone] *Activity should be limited for the next 48 hours. Climb stairs as little as possible and avoid any stooping, bending or strenuous activity for 48 hours. No heavy lifting (anything over 10 pounds) for three days. *Do not drive for 48 hours. *You may resume your usual diet. Drink more fluids than usual. 
 
*Have a responsible person drive you home and stay with you for at least 24 hours after your heart catheterization/angiography. *You may remove the bandage from your Right and Arm in 24 hours. You may shower in 24 hours. No tub baths, hot tubs or swimming for one week. Do not place any lotions, creams, powders, ointments over the puncture site for one week. You may place a clean band-aid over the puncture site each day for 5 days. Change this daily. Heart-Healthy Diet: Care Instructions Your Care Instructions A heart-healthy diet has lots of vegetables, fruits, nuts, beans, and whole grains, and is low in salt. It limits foods that are high in saturated fat, such as meats, cheeses, and fried foods. It may be hard to change your diet, but even small changes can lower your risk of heart attack and heart disease. Follow-up care is a key part of your treatment and safety.  Be sure to make and go to all appointments, and call your doctor if you are having problems. It's also a good idea to know your test results and keep a list of the medicines you take. How can you care for yourself at home? Watch your portions · Learn what a serving is. A \"serving\" and a \"portion\" are not always the same thing. Make sure that you are not eating larger portions than are recommended. For example, a serving of pasta is ½ cup. A serving size of meat is 2 to 3 ounces. A 3-ounce serving is about the size of a deck of cards. Measure serving sizes until you are good at [place]" them. Keep in mind that restaurants often serve portions that are 2 or 3 times the size of one serving. · To keep your energy level up and keep you from feeling hungry, eat often but in smaller portions. · Eat only the number of calories you need to stay at a healthy weight. If you need to lose weight, eat fewer calories than your body burns (through exercise and other physical activity). Eat more fruits and vegetables · Eat a variety of fruit and vegetables every day. Dark green, deep orange, red, or yellow fruits and vegetables are especially good for you. Examples include spinach, carrots, peaches, and berries. · Keep carrots, celery, and other veggies handy for snacks. Buy fruit that is in season and store it where you can see it so that you will be tempted to eat it. · Cook dishes that have a lot of veggies in them, such as stir-fries and soups. Limit saturated and trans fat · Read food labels, and try to avoid saturated and trans fats. They increase your risk of heart disease. Trans fat is found in many processed foods such as cookies and crackers. · Use olive or canola oil when you cook. Try cholesterol-lowering spreads, such as Benecol or Take Control. · Bake, broil, grill, or steam foods instead of frying them. · Choose lean meats instead of high-fat meats such as hot dogs and sausages. Cut off all visible fat when you prepare meat. · Eat fish, skinless poultry, and meat alternatives such as soy products instead of high-fat meats. Soy products, such as tofu, may be especially good for your heart. · Choose low-fat or fat-free milk and dairy products. Eat fish · Eat at least two servings of fish a week. Certain fish, such as salmon and tuna, contain omega-3 fatty acids, which may help reduce your risk of heart attack. Eat foods high in fiber · Eat a variety of grain products every day. Include whole-grain foods that have lots of fiber and nutrients. Examples of whole-grain foods include oats, whole wheat bread, and brown rice. · Buy whole-grain breads and cereals, instead of white bread or pastries. Limit salt and sodium · Limit how much salt and sodium you eat to help lower your blood pressure. · Taste food before you salt it. Add only a little salt when you think you need it. With time, your taste buds will adjust to less salt. · Eat fewer snack items, fast foods, and other high-salt, processed foods. Check food labels for the amount of sodium in packaged foods. · Choose low-sodium versions of canned goods (such as soups, vegetables, and beans). Limit sugar · Limit drinks and foods with added sugar. These include candy, desserts, and soda pop. Limit alcohol · Limit alcohol to no more than 2 drinks a day for men and 1 drink a day for women. Too much alcohol can cause health problems. When should you call for help? Watch closely for changes in your health, and be sure to contact your doctor if: 
? · You would like help planning heart-healthy meals. Where can you learn more? Go to http://gonzalo-stephan.info/. Enter V137 in the search box to learn more about \"Heart-Healthy Diet: Care Instructions. \" Current as of: September 21, 2016 Content Version: 11.4 © 0466-1800 Healthwise, [employer].  Care instructions adapted under license by [employer] (which disclaims liability or warranty for this information). If you have questions about a medical condition or this instruction, always ask your healthcare professional. [address] any warranty or liability for your use of this information. Learning About Percutaneous Coronary Intervention What is percutaneous coronary intervention? Percutaneous coronary intervention (PCI) is the name for procedures to open a blocked coronary artery. The two most common are coronary angioplasty and coronary stent placement. A PCI is a way to open a blocked coronary artery before, during, or after a heart attack. It gets blood flowing to your heart. And it can help prevent heart problems by widening an artery that has been narrowed by fatty buildup (plaque). This also may be called balloon angioplasty. Before a PCI, a doctor does a test to find blocked arteries. The test is called cardiac catheterization. The doctor puts a tiny tube called a catheter into an artery in your groin or arm. The doctor moves the catheter through the artery to your heart. Then he or she puts a dye into the catheter. This makes your heart's arteries show up on a screen so the doctor can see any blockages. The test also can measure the pressure inside your heart's chambers. If you have a blocked artery, the doctor may do an angioplasty or a coronary stent procedure. In an angioplasty, the doctor uses a catheter with a tiny balloon at the tip. He or she puts it into the blocked area and inflates it. The balloon presses the plaque against the walls of the artery. This makes more room for blood to flow. In most cases, the doctor then puts a stent in the artery. A stent is a small, expandable tube that presses against the walls of the artery. The stent is left in the artery to keep the artery open. This helps blood flow. It also may keep small pieces of plaque from breaking off and causing a heart attack. How is PCI done? PCI is done in a cardiac catheterization laboratory (\"cath lab\"). It is done by a heart specialist called a cardiologist. The whole procedure may take 1½ to 3 hours. You lie on a table under a large X-ray machine. You will get medicine through an IV in one of your veins. It helps you relax and not feel pain. You will be awake during the procedure. But you may not be able to remember much about it. The doctor will inject some medicine into your arm or groin to numb the skin. You will feel a small needle stick. It's like having a blood test. You may feel some pressure when the doctor puts in the catheter. But you will not feel pain. The doctor will look at X-ray pictures on a monitor (like a TV set) to move the catheter to your heart. You may feel warm or flushed for a short time when the doctor injects dye into your artery. The doctor then will inflate a tiny balloon at the end of the catheter. The balloon is inflated for a brief time. Then it is deflated and removed. The doctor also may use the catheter to put a stent in the artery. What can you expect after PCI? The catheter will be removed. A nurse or doctor may press on a bandage on the opening. Then a bandage or a compression device may be placed on your groin or wrist at the catheter insertion site. This prevents bleeding. After the test, you will be taken to a room where the catheter site and your heart rate, blood pressure, and temperature will be checked several times. If the catheter was put in your groin, you will need to lie still and keep your leg straight for several hours. If the catheter was put in your arm, you may be able to sit up and get out of bed right away. But you will need to keep your arm still for at least one hour. The average hospital stay is 1 to 2 days for most procedures. When you go home, you will get instructions from your doctor to help you recover well and prevent problems. Make sure to drink plenty of fluids (unless your doctor tells you not to) for several hours after the test. This will help flush the dye out of your body. Your doctor will prescribe blood-thinning medicines. You will likely take aspirin plus another blood thinner. It is very important that you take these medicines exactly as directed. They help keep the coronary artery open and reduce your risk of a heart attack. If you have this procedure, you will still need to make lifestyle changes like eating healthy, being active, and not smoking. This will give you the best chance for a longer, healthier life. Follow-up care is a key part of your treatment and safety. Be sure to make and go to all appointments, and call your doctor if you are having problems. It's also a good idea to know your test results and keep a list of the medicines you take. Where can you learn more? Go to http://gonzalo-stephan.info/. Enter E014 in the search box to learn more about \"Learning About Percutaneous Coronary Intervention. \" Current as of: September 21, 2016 Content Version: 11.4 © 3709-4454 [employer]. Care instructions adapted under license by [employer] (which disclaims liability or warranty for this information). If you have questions about a medical condition or this instruction, always ask your healthcare professional. [address] any warranty or liability for your use of this information. Angina: Care Instructions Your Care Instructions You have a problem called angina. Angina happens when there is not enough blood flow to your heart muscle. Angina is a sign of coronary artery disease (CAD). CAD occurs when blood vessels that supply the heart become narrowed. Having CAD increases your risk of a heart attack. Chest pain or pressure is the most common symptom of angina. But some people have other symptoms, like: · Pain, pressure, or a strange feeling in the back, neck, jaw, or upper belly, or in one or both shoulders or arms. · Shortness of breath. · Nausea or vomiting. · Lightheadedness or sudden weakness. · Fast or irregular heartbeat. Women are somewhat more likely than men to have angina symptoms like shortness of breath, nausea, and back or jaw pain. Angina can be dangerous. That's why it is important to pay attention to your symptoms. Know what is typical for you, learn how to control your symptoms, and understand when you need to get treatment. A change in your usual pattern of symptoms is an emergency. It may mean that you are having a heart attack. The doctor has checked you carefully, but problems can develop later. If you notice any problems or new symptoms, get medical treatment right away. Follow-up care is a key part of your treatment and safety. Be sure to make and go to all appointments, and call your doctor if you are having problems. It's also a good idea to know your test results and keep a list of the medicines you take. How can you care for yourself at home? Medicines ? · If your doctor has given you nitroglycerin for angina symptoms, keep it with you at all times. If you have symptoms, sit down and rest, and take the first dose of nitroglycerin as directed. If your symptoms get worse or are not getting better within 5 minutes, call 911 right away. Stay on the phone. The emergency  will give you further instructions. ? · If your doctor advises it, take 1 low-dose aspirin a day to prevent heart attack. ? · Be safe with medicines. Take your medicines exactly as prescribed. Call your doctor if you think you are having a problem with your medicine. You will get more details on the specific medicines your doctor prescribes. ? Lifestyle changes ? · Do not smoke.  If you need help quitting, talk to your doctor about stop-smoking programs and medicines. These can increase your chances of quitting for good. ? · Eat a heart-healthy diet that is low in saturated fat and salt, and is high in fiber. Talk to your doctor or a dietitian about healthy eating. ? · Stay at a healthy weight. Or lose weight if you need to. Activity ? · Talk to your doctor about a level of activity that is safe for you. ? · If an activity causes angina symptoms, stop and rest.  
When should you call for help? Call 911 anytime you think you may need emergency care. For example, call if: 
? · You passed out (lost consciousness). ? · You have symptoms of a heart attack. These may include: ¨ Chest pain or pressure, or a strange feeling in the chest. 
¨ Sweating. ¨ Shortness of breath. ¨ Nausea or vomiting. ¨ Pain, pressure, or a strange feeling in the back, neck, jaw, or upper belly or in one or both shoulders or arms. ¨ Lightheadedness or sudden weakness. ¨ A fast or irregular heartbeat. After you call 911, the  may tell you to chew 1 adult-strength or 2 to 4 low-dose aspirin. Wait for an ambulance. Do not try to drive yourself. ? · You have angina symptoms that do not go away with rest or are not getting better within 5 minutes after you take a dose of nitroglycerin. ?Call your doctor now or seek immediate medical care if: 
? · You are having angina symptoms more often than usual, or they are different or worse than usual.  
? · You feel dizzy or lightheaded, or you feel like you may faint. ? Watch closely for changes in your health, and be sure to contact your doctor if you have any problems. Where can you learn more? Go to http://gonzalo-stephan.info/. Enter H129 in the search box to learn more about \"Angina: Care Instructions. \" Current as of: September 21, 2016 Content Version: 11.4 © 7303-7070 Healthwise, Inc[employer].  Care instructions adapted under license by [address] (which disclaims liability or warranty for this information). If you have questions about a medical condition or this instruction, always ask your healthcare professional. [address] any warranty or liability for your use of this information. Learning About Coronary Artery Disease (CAD) What is coronary artery disease? Coronary artery disease (CAD) occurs when plaque builds up in the arteries that bring oxygen-rich blood to your heart. Plaque is a fatty substance made of cholesterol, calcium, and other substances in the blood. This process is called hardening of the arteries, or atherosclerosis. What happens when you have coronary artery disease? · Plaque may narrow the coronary arteries. Narrowed arteries cause poor blood flow. This can lead to angina symptoms such as chest pain or discomfort. If blood flow is completely blocked, you could have a heart attack. · You can slow CAD and reduce the risk of future problems by making changes in your lifestyle. These include quitting smoking and eating heart-healthy foods. · Treatments for CAD, along with changes in your lifestyle, can help you live a longer and healthier life. How can you prevent coronary artery disease? · Do not smoke. It may be the best thing you can do to prevent heart disease. If you need help quitting, talk to your doctor about stop-smoking programs and medicines. These can increase your chances of quitting for good. · Be active. Get at least 30 minutes of exercise on most days of the week. Walking is a good choice. You also may want to do other activities, such as running, swimming, cycling, or playing tennis or team sports. · Eat heart-healthy foods. Eat more fruits and vegetables and less foods that contain saturated and trans fats. Limit alcohol, sodium, and sweets. · Stay at a healthy weight. Lose weight if you need to. · Manage other health problems such as diabetes, high blood pressure, and high cholesterol. · Manage stress. Stress can hurt your heart. To keep stress low, talk about your problems and feelings. Don't keep your feelings hidden. · If you have talked about it with your doctor, take a low-dose aspirin every day. Aspirin can help certain people lower their risk of a heart attack or stroke. But taking aspirin isn't right for everyone, because it can cause serious bleeding. Do not start taking daily aspirin unless your doctor knows about it. How is coronary artery disease treated? · Your doctor will suggest that you make lifestyle changes. For example, your doctor may ask you to eat healthy foods, quit smoking, lose extra weight, and be more active. · You will have to take medicines. · Your doctor may suggest a procedure to open narrowed or blocked arteries. This is called angioplasty. Or your doctor may suggest using healthy blood vessels to create detours around narrowed or blocked arteries. This is called bypass surgery. Follow-up care is a key part of your treatment and safety. Be sure to make and go to all appointments, and call your doctor if you are having problems. It's also a good idea to know your test results and keep a list of the medicines you take. Where can you learn more? Go to http://gnozalo-stephan.info/. Enter [phone] in the search box to learn more about \"Learning About Coronary Artery Disease (CAD). \" Current as of: September 21, 2016 Content Version: 11.4 © 3519-5901 [employer]. Care instructions adapted under license by [employer] (which disclaims liability or warranty for this information). If you have questions about a medical condition or this instruction, always ask your healthcare professional. [place] [address] any warranty or liability for your use of this information. DISCHARGE SUMMARY from Nurse PATIENT INSTRUCTIONS: 
 
After general anesthesia or intravenous sedation, for 24 hours or while taking prescription Narcotics: · Limit your activities · Do not drive and operate hazardous machinery · Do not make important personal or business decisions · Do  not drink alcoholic beverages · If you have not urinated within 8 hours after discharge, please contact your surgeon on call. Report the following to your surgeon: 
· Excessive pain, swelling, redness or odor of or around the surgical area · Temperature over 100.5 · Nausea and vomiting lasting longer than 4 hours or if unable to take medications · Any signs of decreased circulation or nerve impairment to extremity: change in color, persistent  numbness, tingling, coldness or increase pain · Any questions What to do at Home: 
Recommended activity: Activity as tolerated and Activity as tolerated and no driving for today The patient is being discharged to home on a low saturated fat, low cholesterol diet. Pt is instructed to abstain from any heavy lifting, straining, stooping or driving for 5 days. Pt is instructed to watch groin site ( if groin access was performed) for bleeding/oozing. If so,pt is instructed to apply firm pressure with clean cloth and call office at [phone]. Pt is instructed to watch for signs of infection which include increasing area of redness around site, fever/hot to touch or purulent drainage. Pt is instructed not to soak in a tub bath for 1 week, but it is okay to shower. *  Please give a list of your current medications to your Primary Care Provider. *  Please update this list whenever your medications are discontinued, doses are 
    changed, or new medications (including over-the-counter products) are added. *  Please carry medication information at all times in case of emergency situations. These are general instructions for a healthy lifestyle: No smoking/ No tobacco products/ Avoid exposure to second hand smoke Surgeon General's Warning:  Quitting smoking now greatly reduces serious risk to your health. Obesity, smoking, and sedentary lifestyle greatly increases your risk for illness A healthy diet, regular physical exercise & weight monitoring are important for maintaining a healthy lifestyle You may be retaining fluid if you have a history of heart failure or if you experience any of the following symptoms:  Weight gain of 3 pounds or more overnight or 5 pounds in a week, increased swelling in our hands or feet or shortness of breath while lying flat in bed. Please call your doctor as soon as you notice any of these symptoms; do not wait until your next office visit. Recognize signs and symptoms of STROKE: 
 
F-face looks uneven A-arms unable to move or move unevenly S-speech slurred or non-existent T-time-call 911 as soon as signs and symptoms begin-DO NOT go Back to bed or wait to see if you get better-TIME IS BRAIN. Warning Signs of HEART ATTACK Call 911 if you have these symptoms: 
? Chest discomfort. Most heart attacks involve discomfort in the center of the chest that lasts more than a few minutes, or that goes away and comes back. It can feel like uncomfortable pressure, squeezing, fullness, or pain. ? Discomfort in other areas of the upper body. Symptoms can include pain or discomfort in one or both arms, the back, neck, jaw, or stomach. ? Shortness of breath with or without chest discomfort. ? Other signs may include breaking out in a cold sweat, nausea, or lightheadedness. Don't wait more than five minutes to call [address]! Fast action can save your life. Calling 911 is almost always the fastest way to get lifesaving treatment. Emergency Medical Services staff can begin treatment when they arrive  up to an hour sooner than if someone gets to the hospital by car. The discharge information has been reviewed with the patient. The patient verbalized understanding. Discharge medications reviewed with the patient and appropriate educational materials and side effects teaching were provided. ___________________________________________________________________________________________________________________________________ English TVhart Announcement We are excited to announce that we are making your provider's discharge notes available to you in Xtime. You will see these notes when they are completed and signed by the physician that discharged you from your recent hospital stay. If you have any questions or concerns about any information you see in Xtime, please call the Health Information Department where you were seen or reach out to your Primary Care Provider for more information about your plan of care. Introducing [facility]! Kyree Olvera introduces Xtime patient portal. Now you can access parts of your medical record, email your doctor's office, and request medication refills online. 1. In your internet browser, go to [email or web address] 2. Click on the First Time User? Click Here link in the Sign In box. You will see the New Member Sign Up page. 3. Enter your Xtime Access Code exactly as it appears below. You will not need to use this code after youve completed the sign-up process. If you do not sign up before the expiration date, you must request a new code. · Xtime Access Code: [email or web address] Expires: 7/11/2018  9:47 AM 
 
4. Enter the last four digits of your Social Security Number (xxxx) and Date of Birth (mm/dd/yyyy) as indicated and click Submit. You will be taken to the next sign-up page. 5. Create a Xtime ID. This will be your MyChart login ID and cannot be changed, so think of one that is secure and easy to remember. 6. Create a Xtime password. You can change your password at any time. 7. Enter your Password Reset Question and Answer. This can be used at a later time if you forget your password. 8. Enter your e-mail address. You will receive e-mail notification when new information is available in [address] 9. Click Sign Up. You can now view and download portions of your medical record. 10. Click the Download Summary menu link to download a portable copy of your medical information. If you have questions, please visit the Frequently Asked Questions section of the [employer] website. Remember, [employer] is NOT to be used for urgent needs. For medical emergencies, dial 911. Now available from your iPhone and Android! Introducing Cuate Sanchez As a [facility] patient, I wanted to make you aware of our electronic visit tool called Cuate Bajwafin. [employer] allows you to connect within minutes with a medical provider 24 hours a day, seven days a week via a mobile device or tablet or logging into a secure website from your computer. You can access Cuate BioSurplus from anywhere in the [place]. A virtual visit might be right for you when you have a simple condition and feel like you just dont want to get out of bed, or cant get away from work for an appointment, when your regular [facility] provider is not available (evenings, weekends or holidays), or when youre out of town and need minor care. Electronic visits cost only $49 and if the [employer] provider determines a prescription is needed to treat your condition, one can be electronically transmitted to a nearby pharmacy*. Please take a moment to enroll today if you have not already done so. The enrollment process is free and takes just a few minutes. To enroll, please download the [employer] desiree to your tablet or phone, or visit [email or web address] to enroll on your computer.    
And, as an [address] [place] [address] patient with a [employer] account, the results of your visits will be scanned into your electronic medical record and your primary care provider will be able to view the scanned results. We urge you to continue to see your regular [employer] provider for your ongoing medical care. And while your primary care provider may not be the one available when you seek a Cuate Bajwafin virtual visit, the peace of mind you get from getting a real diagnosis real time can be priceless. For more information on Cuate [employer]ien[employer]estaurus[employer], view our Frequently Asked Questions (FAQs) at [email or web address]. Sincerely, 
 
Geovanna Ramirez MD 
Chief Medical Officer [address] *:  certain medications cannot be prescribed via Minilogstaurusfin Providers Seen During Your Hospitalization Provider Specialty Primary office phone Jennifer Irizarry MD Cardiology [phone] Your Primary Care Physician (PCP) Primary Care Physician Office Phone Office Fax Lem Boast [phone] You are allergic to the following No active allergies Recent Documentation Height Weight BMI Smoking Status 1.854 m 107.5 kg 31.27 kg/m2 Former Smoker Emergency Contacts Name Discharge Info Relation Home Work Mobile Timothy Zimmerman  Spouse [3] [phone] Patient Belongings The following personal items are in your possession at time of discharge: 
  Dental Appliances: With patient  Visual Aid: Glasses      Home Medications: None   Jewelry: None  Clothing: At bedside    Other Valuables: None Discharge Instructions Attachments/References CARDIAC REHABILITATION (ENGLISH) Patient Handouts Cardiac Rehabilitation: Care Instructions Your Care Instructions Cardiac rehabilitation is a program for people who have a heart problem, such as a heart attack, heart failure, or a heart valve disease.  The program includes exercise, lifestyle changes, education, and emotional support. Cardiac rehab can help you improve the quality of your life through better overall health. It can help you lose weight and feel better about yourself. On your cardiac rehab team, you may have your doctor, a nurse specialist, a dietitian, and a physical therapist. They will design your cardiac rehab program specifically for you. You will learn how to reduce your risk for heart problems, how to manage stress, and how to eat a heart-healthy diet. By the end of the program, you will be ready to maintain a healthier lifestyle on your own. Follow-up care is a key part of your treatment and safety. Be sure to make and go to all appointments, and call your doctor if you are having problems. It's also a good idea to know your test results and keep a list of the medicines you take. How can you care for yourself at home? · Take your medicines exactly as prescribed. Call your doctor if you think you are having a problem with your medicine. You will get more details on the specific medicines your doctor prescribes. · Weigh yourself every day if your doctor tells you to. Watch for sudden weight gain. Weigh yourself on the same scale with the same amount of clothing at the same time of day. · Plan your meals so that you are eating heart-healthy foods. ¨ Eat a variety of foods daily. Fresh fruits and vegetables and whole-grains are good choices. ¨ Limit your fat intake, especially saturated and trans fat. ¨ Limit salt (sodium). ¨ Increase fiber in your diet. ¨ Limit alcohol. · Learn how to take your pulse so that you can track your heart rate during exercise. · Always check with your doctor before you begin a new exercise program. 
· Warm up before you exercise and cool down afterward for at least 15 minutes each. This will help your heart gradually prepare for and recover from exercise and avoid pushing your heart too hard. · Stop exercising if you have any unusual discomfort, such as chest pain. · Do not smoke. Smoking can make heart problems worse. If you need help quitting, talk to your doctor about stop-smoking programs and medicines. These can increase your chances of quitting for good. When should you call for help? Call 911 anytime you think you may need emergency care. For example, call if: 
? · You have severe trouble breathing. ? · You cough up pink, foamy mucus and you have trouble breathing. ? · You have symptoms of a heart attack. These may include: ¨ Chest pain or pressure, or a strange feeling in the chest. 
¨ Sweating. ¨ Shortness of breath. ¨ Nausea or vomiting. ¨ Pain, pressure, or a strange feeling in the back, neck, jaw, or upper belly or in one or both shoulders or arms. ¨ Lightheadedness or sudden weakness. ¨ A fast or irregular heartbeat. After you call 911, the  may tell you to chew 1 adult-strength or 2 to 4 low-dose aspirin. Wait for an ambulance. Do not try to drive yourself. ? · You have angina symptoms (such as chest pain or pressure) that do not go away with rest or are not getting better within 5 minutes after you take a dose of nitroglycerin. ? · You have symptoms of a stroke. These may include: 
¨ Sudden numbness, tingling, weakness, or loss of movement in your face, arm, or leg, especially on only one side of your body. ¨ Sudden vision changes. ¨ Sudden trouble speaking. ¨ Sudden confusion or trouble understanding simple statements. ¨ Sudden problems with walking or balance. ¨ A sudden, severe headache that is different from past headaches. ? · You passed out (lost consciousness). ?Call your doctor now or seek immediate medical care if: 
? · You have new or increased shortness of breath. ? · You are dizzy or lightheaded, or you feel like you may faint.   
? · You gain weight suddenly, such as more than 2 to 3 pounds in a day or 5 pounds in a week. (Your doctor may suggest a different range of weight gain.) ? · You have increased swelling in your legs, ankles, or feet. ? Watch closely for changes in your health, and be sure to contact your doctor if you have any problems. Where can you learn more? Go to http://gonzalo-stephan.info/. Enter T920 in the search box to learn more about \"Cardiac Rehabilitation: Care Instructions. \" Current as of: September 21, 2016 Content Version: 11.4 © 4341-6181 Healthwise, Incorporated. Care instructions adapted under license by [employer] (which disclaims liability or warranty for this information). If you have questions about a medical condition or this instruction, always ask your healthcare professional. [address] any warranty or liability for your use of this information. Please provide this summary of care documentation to your next provider. Signatures-by signing, you are acknowledging that this After Visit Summary has been reviewed with you and you have received a copy. Patient Signature:  ____________________________________________________________ Date:  ____________________________________________________________  
  
[facility] Provider Signature:  ____________________________________________________________ Date:  ____________________________________________________________

## 2018-04-24 NOTE — PROCEDURES
[address] CATH    Name:Huey RENE  MR#: [id number]  : 1955  ACCOUNT #: [de-identified]   DATE OF SERVICE: 2018    PROCEDURES PERFORMED:  Left heart cath, selective coronary angiography, left ventriculogram with 2-vessel percutaneous intervention for new onset angina. COMPLICATIONS:  None. TIME:  8:11 to 8:53     2 mg of Versed were given by Emily Davis Weekly. Aortic pressure 107/83. LVEDP 13. Contrast 206 mL. Radial access was used. The patient has short root. TIG 4 was used to engage the left system. AR MOD was used for a very low takeoff of right coronary. The intervention was done with a JL3.5 guide, Prowater wire. Stent to the circumflex was a 3.5 x 18 SHAHIDA. Stents to the LAD were a 3.0 x 15 and a 3.0 x 30 SHAHIDA. FINDINGS:  Left ventriculogram done in LITTLE projection shows EF 60%. No gradient on pullback. LVEDP of 13. Left main arises normally, bifurcates in LAD and circumflex. Left main is large caliber with no significant disease. LAD courses off the left main towards the apex, supplies 2 proximal diagonals. The proximal through mid LAD has a long area of plaque burden with at least 2 tandem areas of 80% to 90% stenosis. Mid distal LAD has no significant disease. Circumflex artery in the AV groove is not dominant, but is large caliber and supplies a large bifurcating OM. The mid circ has a 90% focal lesion. Right coronary artery comes off low off the right cusp and requires an AR MOD to engage. Right is a large dominant vessel supplying a large posterolateral and posterior descending system. The mid right has a 50% stenosis. The patient was given therapeutic dose of Angiomax and Prowater wire was used to cross into the circumflex. Direct stenting was performed with a 3.5 x 18 Resolute SHAHIDA to high pressure with excellent results.     Next, the LAD was wired and a 3.0 x 30 stent was placed in the mid LAD and deployed to high pressure. This was followed by a proximal 3.0 x 15 Resolute SHAHIDA deployed to 22 atmospheres. Then the crossover area of overlap was ballooned to 24 atmospheres. There was excellent SABINA 3 flow in the LAD and circumflex after stenting with no residual stenosis. CONCLUSIONS:  Multivessel stenting inclusive of the LAD and circumflex with tandem 3.0 x 15 and 3.0 x 30 stents in the LAD and a 3.5 x 18 Resolute stent in the circumflex. The patient will be treated with dual antiplatelet therapy.       Kaye Craw, MD Morris Carrel / Farhan Andrews  D: 04/24/2018 09:04     T: 04/24/2018 12:56  JOB #: [id number]

## 2018-04-24 NOTE — PROGRESS NOTES
Cardiac Rehab: Spoke with patient regarding referral to cardiac rehab. Patient meets admission criteria based on PCI(4/24/18). Written information about Cardiac Rehab given and reviewed with patient. Discussed lifestyle modifications to promote cardiac wellness. Patient indicated that he wants to participate in the cardiac rehab program but will have to wait until after family members scheduled medical procedures and not sure of the when that will be completed. Pt given telephone number and is aware his referral is valid for 6 months. When outpatient Cardiac rehab consult received, we will contact he also. Cardiologist is Dr. Michelle Arellano.       Thank you,  ANDRE AnayaN, RN  Cardiopulmonary Rehabilitation Nurse Liaison  [facility]

## 2018-04-24 NOTE — PROGRESS NOTES
TRANSFER - OUT REPORT:    Verbal report given to Kevin Reich RN on Jseús Delvalle  being transferred to [id number] for routine progression of care       Report consisted of patients Situation, Background, Assessment and   Recommendations(SBAR). Information from the following report(s) Procedure Summary, MAR and Recent Results was reviewed with the receiving nurse. Lines:   Peripheral IV 04/24/18 Right Antecubital (Active)       Peripheral IV 04/24/18 Left Forearm (Active)        Opportunity for questions and clarification was provided.

## 2018-04-24 NOTE — H&P
Progress Notes  Encounter Date: 4/20/2018  Brett Shah MD   Cardiology   Expand All Collapse All    []Hide copied text  []Hover for attribution information  [address] [place], [place]  [address] [place] [address], [address] [place] [address]  PHONE: [phone]     SUBJECTIVE: Ernie Delvalle (1955) is a 57 y.o. male seen for a follow up visit regarding the following:           ICD-10-CM ICD-9-CM   1. Mixed hyperlipidemia E78.2 272.2   2. Gastroesophageal reflux disease without esophagitis K21.9 530.81   3. Precordial pain R07.2 786.51   4. Dyspnea on exertion R06.09 786.09      Was in the er for cp last week. Er workup ok. Has had at least two er workups for cp. Has continued exertional symptoms. Some episodes with activity. Walking to car today he had sob. Several months for the sob. Getting worse.        Past Medical History, Past Surgical History, Family history, Social History, and Medications were all reviewed with the patient today and updated as necessary.            Outpatient Prescriptions Marked as Taking for the 4/20/18 encounter (Office Visit) with Brett Shah MD   Medication Sig Dispense Refill    fluticasone-vilanterol (BREO ELLIPTA) 200-25 mcg/dose inhaler Take 1 Puff by inhalation daily. Indications: MAINTENANCE THERAPY FOR ASTHMA (Patient taking differently: Take 1 Puff by inhalation daily as needed.  Indications: MAINTENANCE THERAPY FOR ASTHMA) 1 Inhaler 11    omeprazole (PRILOSEC) 40 mg capsule TAKE ONE CAPSULE BY MOUTH EVERY DAY (Patient taking differently: TAKE ONE CAPSULE BY MOUTH EVERY DAY (takes every other day)) 80 Cap 4      No Known Allergies       Past Medical History:   Diagnosis Date    Acute prostatitis 3/11/2015    Esophageal reflux 3/11/2015    Hallux valgus (acquired)      Hematuria, unspecified      Impotence of organic origin 3/11/2015    Other and unspecified hyperlipidemia      Primary localized osteoarthrosis, unspecified site              Past Surgical History:   Procedure Laterality Date    HX CHOLECYSTECTOMY        HX HERNIA REPAIR                 Family History   Problem Relation Age of Onset    Cancer Mother         Liver Cancer            Social History   Substance Use Topics    Smoking status: Former Smoker       Packs/day: 0.50       Years: 30.00       Types: Cigarettes       Quit date: 44    Smokeless tobacco: Former User    Alcohol use No      Pt does not have history of early onset cad or heart failure in immediate family members     ROS:  Review of Systems - General ROS: negative for - chills, fatigue or fever  Hematological and Lymphatic ROS: negative for - bleeding problems, bruising or jaundice  Respiratory ROS: positive for - shortness of breath  Cardiovascular ROS: positive for - chest pain and dyspnea on exertion  Gastrointestinal ROS: no abdominal pain, change in bowel habits, or black or bloody stools  Neurological ROS: no TIA or stroke symptoms  All other systems negative.        PHYSICAL EXAM:       Visit Vitals    /60    Pulse 78    Ht 6' 1\" (1.854 m)    Wt 248 lb (112.5 kg)    BMI 32.72 kg/m2         Physical Examination: General appearance - alert, well appearing, and in no distress  Mental status - alert, oriented to person, place, and time  Eyes - pupils equal and reactive, extraocular eye movements intact  Neck/lymph - supple, no significant adenopathy  Chest/lungs - clear to auscultation, no wheezes, rales or rhonchi, symmetric air entry  Heart/CV - normal rate, regular rhythm, normal S1, S2, no murmurs, rubs, clicks or gallops  Abdomen/GI - soft, nontender, nondistended, no masses or organomegaly  Musculoskeletal - no joint tenderness, deformity or swelling  Extremities - peripheral pulses normal, no pedal edema, no clubbing or cyanosis  Skin - normal coloration and turgor, no rashes, no suspicious skin lesions noted     EKG review nsr      Recent Results           Recent Results (from the past 672 hour(s))   EKG, 12 LEAD, INITIAL     Collection Time: 04/12/18  9:49 AM   Result Value Ref Range     Ventricular Rate 72 BPM     Atrial Rate 72 BPM     P-R Interval 208 ms     QRS Duration 102 ms     Q-T Interval 402 ms     QTC Calculation (Bezet) 440 ms     Calculated P Axis 58 degrees     Calculated R Axis -18 degrees     Calculated T Axis 50 degrees     Diagnosis           !! AGE AND GENDER SPECIFIC ECG ANALYSIS !! Normal sinus rhythm  Normal ECG  No previous ECGs available  Confirmed by [facility] LUANA LOCKETT (), BONI NAQVI ([id number]) on 4/12/2018 28:65:27 AM   METABOLIC PANEL, COMPREHENSIVE     Collection Time: 04/12/18  9:53 AM   Result Value Ref Range     Sodium 139 136 - 145 mmol/L     Potassium 4.1 3.5 - 5.1 mmol/L     Chloride 106 98 - 107 mmol/L     CO2 25 21 - 32 mmol/L     Anion gap 8 7 - 16 mmol/L     Glucose 149 (H) 65 - 100 mg/dL     BUN 19 8 - 23 MG/DL     Creatinine 1.16 0.8 - 1.5 MG/DL     GFR est AA >60 >60 ml/min/1.73m2     GFR est non-AA >60 >60 ml/min/1.73m2     Calcium 8.4 8.3 - 10.4 MG/DL     Bilirubin, total 0.8 0.2 - 1.1 MG/DL     ALT (SGPT) 31 12 - 65 U/L     AST (SGOT) 18 15 - 37 U/L     Alk. phosphatase 71 50 - 136 U/L     Protein, total 7.2 6.3 - 8.2 g/dL     Albumin 3.9 3.2 - 4.6 g/dL     Globulin 3.3 2.3 - 3.5 g/dL     A-G Ratio 1.2 1.2 - 3.5     CBC WITH AUTOMATED DIFF     Collection Time: 04/12/18  9:53 AM   Result Value Ref Range     WBC 6.7 4.3 - 11.1 K/uL     RBC 5.50 4.23 - 5.67 M/uL     HGB 17.0 13.6 - 17.2 g/dL     HCT 49.0 41.1 - 50.3 %     MCV 89.1 79.6 - 97.8 FL     MCH 30.9 26.1 - 32.9 PG     MCHC 34.7 31.4 - 35.0 g/dL     RDW 13.6 11.9 - 14.6 %     PLATELET 579 961 - 931 K/uL     MPV 9.7 (L) 10.8 - 14.1 FL     DF AUTOMATED        NEUTROPHILS 52 43 - 78 %     LYMPHOCYTES 39 13 - 44 %     MONOCYTES 6 4.0 - 12.0 %     EOSINOPHILS 3 0.5 - 7.8 %     BASOPHILS 0 0.0 - 2.0 %     IMMATURE GRANULOCYTES 0 0.0 - 5.0 %     ABS. NEUTROPHILS 3.4 1.7 - 8.2 K/UL     ABS. LYMPHOCYTES 2.6 0.5 - 4.6 K/UL     ABS. MONOCYTES 0.4 0.1 - 1.3 K/UL     ABS. EOSINOPHILS 0.2 0.0 - 0.8 K/UL     ABS. BASOPHILS 0.0 0.0 - 0.2 K/UL     ABS. IMM. GRANS. 0.0 0.0 - 0.5 K/UL   D DIMER     Collection Time: 04/12/18  9:53 AM   Result Value Ref Range     D DIMER 0.56 (H) <0.56 ug/ml(FEU)   BNP     Collection Time: 04/12/18  9:53 AM   Result Value Ref Range     BNP 24 pg/mL   POC TROPONIN-I     Collection Time: 04/12/18  9:53 AM   Result Value Ref Range     Troponin-I (POC) 0 (L) 0.02 - 0.05 ng/ml   EKG, 12 LEAD, SUBSEQUENT     Collection Time: 04/12/18 11:56 AM   Result Value Ref Range     Ventricular Rate 69 BPM     Atrial Rate 69 BPM     P-R Interval 192 ms     QRS Duration 88 ms     Q-T Interval 414 ms     QTC Calculation (Bezet) 443 ms     Calculated P Axis 68 degrees     Calculated R Axis -5 degrees     Calculated T Axis 45 degrees     Diagnosis           !! AGE AND GENDER SPECIFIC ECG ANALYSIS !! Normal sinus rhythm  Normal ECG  When compared with ECG of 12-APR-2018 09:49,  No significant change was found  Confirmed by Riley Shaikh MD (), Bonita Martinez ([id number]) on 4/12/2018 12:44:56 PM   POC TROPONIN-I     Collection Time: 04/12/18 12:03 PM   Result Value Ref Range     Troponin-I (POC) 0 (L) 0.02 - 0.05 ng/ml               Lab Results   Component Value Date/Time     Cholesterol, total 166 12/05/2017 08:50 AM     HDL Cholesterol 25 (L) 12/05/2017 08:50 AM     LDL, calculated 108 (H) 12/05/2017 08:50 AM     VLDL, calculated 33 12/05/2017 08:50 AM     Triglyceride 163 (H) 12/05/2017 08:50 AM            ASSESSMENT and PLAN           1. Mixed hyperlipidemia  The current medical regimen is effective;  continue present plan and medications.        2. Gastroesophageal reflux disease without esophagitis  The current medical regimen is effective;  continue present plan and medications.        3. Precordial pain  Needs cath and echo Risk and benefits discussed. Risk of bleeding, infection, vascular injury, kidney failure or CVA reviewed.  Risk of emergent surgery or death discussed.        4. Dyspnea on exertion  Echo.     Has weekly tachypalpitations where his heart races               Orders Placed This Encounter    CBC WITH AUTOMATED DIFF       Standing Status:   Future       Standing Expiration Date:   70/55/8715    METABOLIC PANEL, COMPREHENSIVE       Standing Status:   Future       Standing Expiration Date:   10/19/2018    TSH 3RD GENERATION       Standing Status:   Future       Standing Expiration Date:   10/19/2018    LIPID PANEL       Fasting sample requested       Standing Status:   Future       Standing Expiration Date:   10/19/2018    LEFT HEART CATH       Standing Status:   Future       Standing Expiration Date:   10/19/2018       Scheduling Instructions:         WITH POSSIBLE PCI       Order Specific Question:   Reason for Exam:       Answer:   cp    AMB POC EXTERNAL MOBILE CV TELEMETRY W/I&REPORT UP TO 30 DAYS       Scheduling Instructions:         Telemetry order for 21 days       Order Specific Question:   Reason for Exam:       Answer:   palpitations         Follow-up Disposition: Not on File              Antelmo Esquivel MD  4/20/2018  9:18 AM            Electronically signed by Antelmo Esquivel MD at 04/20/18 [id number]        Office Visit on 4/20/2018              Detailed Report             Note shared with patient   Note Details   Author Antelmo Esquivel MD File Time 04/20/18 2313   Author Type Physician Status Signed   Last  Antelmo Esquivel MD Specialty Cardiology     Pt set up for procedure. Risks benefits and alternatives discussed. Pt agrees to proceed. Risks of bleeding infection emergent surgical procedure loss of life or limb renal failure and other known risks discussed. Pt agrees to proceed and agrees to sign consent form.

## 2018-04-25 VITALS
RESPIRATION RATE: 20 BRPM | HEART RATE: 96 BPM | BODY MASS INDEX: 31.41 KG/M2 | WEIGHT: 237 LBS | SYSTOLIC BLOOD PRESSURE: 136 MMHG | TEMPERATURE: 97.6 F | HEIGHT: 73 IN | DIASTOLIC BLOOD PRESSURE: 82 MMHG | OXYGEN SATURATION: 94 %

## 2018-04-25 LAB
ANION GAP SERPL CALC-SCNC: 10 MMOL/L (ref 7–16)
BUN SERPL-MCNC: 11 MG/DL (ref 8–23)
CALCIUM SERPL-MCNC: 8.4 MG/DL (ref 8.3–10.4)
CHLORIDE SERPL-SCNC: 111 MMOL/L (ref 98–107)
CO2 SERPL-SCNC: 22 MMOL/L (ref 21–32)
CREAT SERPL-MCNC: 1.17 MG/DL (ref 0.8–1.5)
GLUCOSE SERPL-MCNC: 156 MG/DL (ref 65–100)
POTASSIUM SERPL-SCNC: 3.8 MMOL/L (ref 3.5–5.1)
SODIUM SERPL-SCNC: 143 MMOL/L (ref 136–145)

## 2018-04-25 PROCEDURE — 74011250637 HC RX REV CODE- 250/637: Performed by: INTERNAL MEDICINE

## 2018-04-25 PROCEDURE — 36415 COLL VENOUS BLD VENIPUNCTURE: CPT | Performed by: INTERNAL MEDICINE

## 2018-04-25 PROCEDURE — 80048 BASIC METABOLIC PNL TOTAL CA: CPT | Performed by: INTERNAL MEDICINE

## 2018-04-25 RX ORDER — ROSUVASTATIN CALCIUM 20 MG/1
20 TABLET, COATED ORAL DAILY
Qty: 30 TAB | Refills: 6 | Status: SHIPPED | OUTPATIENT
Start: 2018-04-25 | End: 2019-01-29 | Stop reason: SDUPTHER

## 2018-04-25 RX ORDER — NITROGLYCERIN 0.4 MG/1
0.4 TABLET SUBLINGUAL
Qty: 1 BOTTLE | Refills: 3 | Status: SHIPPED | OUTPATIENT
Start: 2018-04-25

## 2018-04-25 RX ORDER — METOPROLOL SUCCINATE 25 MG/1
25 TABLET, EXTENDED RELEASE ORAL DAILY
Qty: 30 TAB | Refills: 6 | Status: SHIPPED | OUTPATIENT
Start: 2018-04-25 | End: 2018-12-20 | Stop reason: SDUPTHER

## 2018-04-25 RX ORDER — GUAIFENESIN 100 MG/5ML
81 LIQUID (ML) ORAL DAILY
Qty: 30 TAB | Refills: 11 | Status: SHIPPED | OUTPATIENT
Start: 2018-04-25

## 2018-04-25 RX ADMIN — Medication 5 ML: at 06:18

## 2018-04-25 RX ADMIN — ASPIRIN 81 MG 81 MG: 81 TABLET ORAL at 08:40

## 2018-04-25 RX ADMIN — ROSUVASTATIN CALCIUM 20 MG: 20 TABLET, FILM COATED ORAL at 08:40

## 2018-04-25 RX ADMIN — TICAGRELOR 90 MG: 90 TABLET ORAL at 08:40

## 2018-04-25 NOTE — PROGRESS NOTES
4/25/2018 6:23 AM    Admit Date: 4/24/2018    Admit Diagnosis: Chest pain [R07.9]      Subjective:    Patient sp multivessel stenting. Looks good. Ok to go home.     Objective:      Visit Vitals    /78    Pulse 89    Temp 97.7 °F (36.5 °C)    Resp 20    Ht 6' 1\" (1.854 m)    Wt 107.5 kg (237 lb)    SpO2 94%    BMI 31.27 kg/m2       ROS:  General ROS: negative for - chills  Hematological and Lymphatic ROS: negative for - blood clots or jaundice  Respiratory ROS: no cough, shortness of breath, or wheezing  Cardiovascular ROS: no chest pain or dyspnea on exertion  Gastrointestinal ROS: no abdominal pain, change in bowel habits, or black or bloody stools  Neurological ROS: no TIA or stroke symptoms    Physical Exam:    Physical Examination: General appearance - alert, well appearing, and in no distress  Mental status - alert, oriented to person, place, and time  Eyes - pupils equal and reactive, extraocular eye movements intact  Neck/lymph - supple, no significant adenopathy  Chest/CV - clear to auscultation, no wheezes, rales or rhonchi, symmetric air entry  Heart - normal rate, regular rhythm, normal S1, S2, no murmurs, rubs, clicks or gallops  Abdomen/GI - soft, nontender, nondistended, no masses or organomegaly  Musculoskeletal - no joint tenderness, deformity or swelling  Extremities - peripheral pulses normal, no pedal edema, no clubbing or cyanosis  Skin - normal coloration and turgor, no rashes, no suspicious skin lesions noted    Current Facility-Administered Medications   Medication Dose Route Frequency    0.9% sodium chloride infusion  75 mL/hr IntraVENous CONTINUOUS    0.9% sodium chloride infusion  1 mL/kg/hr IntraVENous PRN    heparin (PF) 2 units/ml in NS infusion  3 mL/hr IntraVENous CONTINUOUS    0.9% sodium chloride infusion  75 mL/hr IntraVENous CONTINUOUS    sodium chloride (NS) flush 5-10 mL  5-10 mL IntraVENous Q8H    sodium chloride (NS) flush 5-10 mL  5-10 mL IntraVENous PRN  aspirin chewable tablet 81 mg  81 mg Oral DAILY    rosuvastatin (CRESTOR) tablet 20 mg  20 mg Oral DAILY    ticagrelor (BRILINTA) tablet 90 mg  90 mg Oral Q12H       Data Review:   @LABRCNT(Na,K,BUN,CREA,WBC,HGB,HCT,PLT,INR,TRP,TCHOL*,Triglyceride*,LDL*,LDLCPOC HDL*,HDL])@    TELEMETRY: nsr    Assessment/Plan:     Active Problems:    CAD (coronary artery disease) (4/24/2018) home today on dapt.           Susan Melo MD

## 2018-04-25 NOTE — PROGRESS NOTES
Discharge instructions reviewed with patient. Prescriptions given for Brilinta, Toprol, ASA, Crestor, Nitro and med info sheets provided for all new medications. Opportunity for questions provided. Patient voiced understanding of all discharge instructions.

## 2018-04-25 NOTE — DISCHARGE INSTRUCTIONS
Cardiac Catheterization/Angiography Discharge Instructions    *Check the puncture site frequently for swelling or bleeding. If you see any bleeding, lie down and apply pressure over the area with a clean town or washcloth. Notify your doctor for any redness, swelling, drainage or oozing from the puncture site. Notify your doctor for any fever or chills. *If the leg or arm with the puncture becomes cold, numb or painful, call [facility] at [phone]    *Activity should be limited for the next 48 hours. Climb stairs as little as possible and avoid any stooping, bending or strenuous activity for 48 hours. No heavy lifting (anything over 10 pounds) for three days. *Do not drive for 48 hours. *You may resume your usual diet. Drink more fluids than usual.    *Have a responsible person drive you home and stay with you for at least 24 hours after your heart catheterization/angiography. *You may remove the bandage from your Right and Arm in 24 hours. You may shower in 24 hours. No tub baths, hot tubs or swimming for one week. Do not place any lotions, creams, powders, ointments over the puncture site for one week. You may place a clean band-aid over the puncture site each day for 5 days. Change this daily. Heart-Healthy Diet: Care Instructions  Your Care Instructions    A heart-healthy diet has lots of vegetables, fruits, nuts, beans, and whole grains, and is low in salt. It limits foods that are high in saturated fat, such as meats, cheeses, and fried foods. It may be hard to change your diet, but even small changes can lower your risk of heart attack and heart disease. Follow-up care is a key part of your treatment and safety. Be sure to make and go to all appointments, and call your doctor if you are having problems. It's also a good idea to know your test results and keep a list of the medicines you take. How can you care for yourself at home?   Watch your portions  · Learn what a serving is. A \"serving\" and a \"portion\" are not always the same thing. Make sure that you are not eating larger portions than are recommended. For example, a serving of pasta is ½ cup. A serving size of meat is 2 to 3 ounces. A 3-ounce serving is about the size of a deck of cards. Measure serving sizes until you are good at [place]" them. Keep in mind that restaurants often serve portions that are 2 or 3 times the size of one serving. · To keep your energy level up and keep you from feeling hungry, eat often but in smaller portions. · Eat only the number of calories you need to stay at a healthy weight. If you need to lose weight, eat fewer calories than your body burns (through exercise and other physical activity). Eat more fruits and vegetables  · Eat a variety of fruit and vegetables every day. Dark green, deep orange, red, or yellow fruits and vegetables are especially good for you. Examples include spinach, carrots, peaches, and berries. · Keep carrots, celery, and other veggies handy for snacks. Buy fruit that is in season and store it where you can see it so that you will be tempted to eat it. · Cook dishes that have a lot of veggies in them, such as stir-fries and soups. Limit saturated and trans fat  · Read food labels, and try to avoid saturated and trans fats. They increase your risk of heart disease. Trans fat is found in many processed foods such as cookies and crackers. · Use olive or canola oil when you cook. Try cholesterol-lowering spreads, such as Benecol or Take Control. · Bake, broil, grill, or steam foods instead of frying them. · Choose lean meats instead of high-fat meats such as hot dogs and sausages. Cut off all visible fat when you prepare meat. · Eat fish, skinless poultry, and meat alternatives such as soy products instead of high-fat meats. Soy products, such as tofu, may be especially good for your heart.   · Choose low-fat or fat-free milk and dairy products. Eat fish  · Eat at least two servings of fish a week. Certain fish, such as salmon and tuna, contain omega-3 fatty acids, which may help reduce your risk of heart attack. Eat foods high in fiber  · Eat a variety of grain products every day. Include whole-grain foods that have lots of fiber and nutrients. Examples of whole-grain foods include oats, whole wheat bread, and brown rice. · Buy whole-grain breads and cereals, instead of white bread or pastries. Limit salt and sodium  · Limit how much salt and sodium you eat to help lower your blood pressure. · Taste food before you salt it. Add only a little salt when you think you need it. With time, your taste buds will adjust to less salt. · Eat fewer snack items, fast foods, and other high-salt, processed foods. Check food labels for the amount of sodium in packaged foods. · Choose low-sodium versions of canned goods (such as soups, vegetables, and beans). Limit sugar  · Limit drinks and foods with added sugar. These include candy, desserts, and soda pop. Limit alcohol  · Limit alcohol to no more than 2 drinks a day for men and 1 drink a day for women. Too much alcohol can cause health problems. When should you call for help? Watch closely for changes in your health, and be sure to contact your doctor if:  ? · You would like help planning heart-healthy meals. Where can you learn more? Go to http://gonzalo-stephan.info/. Enter V137 in the search box to learn more about \"Heart-Healthy Diet: Care Instructions. \"  Current as of: September 21, 2016  Content Version: 11.4  © 5695-6249 [employer]. Care instructions adapted under license by [employer] (which disclaims liability or warranty for this information).  If you have questions about a medical condition or this instruction, always ask your healthcare professional. [address] any warranty or liability for your use of this information. Learning About Percutaneous Coronary Intervention  What is percutaneous coronary intervention? Percutaneous coronary intervention (PCI) is the name for procedures to open a blocked coronary artery. The two most common are coronary angioplasty and coronary stent placement. A PCI is a way to open a blocked coronary artery before, during, or after a heart attack. It gets blood flowing to your heart. And it can help prevent heart problems by widening an artery that has been narrowed by fatty buildup (plaque). This also may be called balloon angioplasty. Before a PCI, a doctor does a test to find blocked arteries. The test is called cardiac catheterization. The doctor puts a tiny tube called a catheter into an artery in your groin or arm. The doctor moves the catheter through the artery to your heart. Then he or she puts a dye into the catheter. This makes your heart's arteries show up on a screen so the doctor can see any blockages. The test also can measure the pressure inside your heart's chambers. If you have a blocked artery, the doctor may do an angioplasty or a coronary stent procedure. In an angioplasty, the doctor uses a catheter with a tiny balloon at the tip. He or she puts it into the blocked area and inflates it. The balloon presses the plaque against the walls of the artery. This makes more room for blood to flow. In most cases, the doctor then puts a stent in the artery. A stent is a small, expandable tube that presses against the walls of the artery. The stent is left in the artery to keep the artery open. This helps blood flow. It also may keep small pieces of plaque from breaking off and causing a heart attack. How is PCI done? PCI is done in a cardiac catheterization laboratory (\"cath lab\"). It is done by a heart specialist called a cardiologist. The whole procedure may take 1½ to 3 hours. You lie on a table under a large X-ray machine.  You will get medicine through an IV in one of your veins. It helps you relax and not feel pain. You will be awake during the procedure. But you may not be able to remember much about it. The doctor will inject some medicine into your arm or groin to numb the skin. You will feel a small needle stick. It's like having a blood test. You may feel some pressure when the doctor puts in the catheter. But you will not feel pain. The doctor will look at X-ray pictures on a monitor (like a TV set) to move the catheter to your heart. You may feel warm or flushed for a short time when the doctor injects dye into your artery. The doctor then will inflate a tiny balloon at the end of the catheter. The balloon is inflated for a brief time. Then it is deflated and removed. The doctor also may use the catheter to put a stent in the artery. What can you expect after PCI? The catheter will be removed. A nurse or doctor may press on a bandage on the opening. Then a bandage or a compression device may be placed on your groin or wrist at the catheter insertion site. This prevents bleeding. After the test, you will be taken to a room where the catheter site and your heart rate, blood pressure, and temperature will be checked several times. If the catheter was put in your groin, you will need to lie still and keep your leg straight for several hours. If the catheter was put in your arm, you may be able to sit up and get out of bed right away. But you will need to keep your arm still for at least one hour. The average hospital stay is 1 to 2 days for most procedures. When you go home, you will get instructions from your doctor to help you recover well and prevent problems. Make sure to drink plenty of fluids (unless your doctor tells you not to) for several hours after the test. This will help flush the dye out of your body. Your doctor will prescribe blood-thinning medicines. You will likely take aspirin plus another blood thinner.  It is very important that you take these medicines exactly as directed. They help keep the coronary artery open and reduce your risk of a heart attack. If you have this procedure, you will still need to make lifestyle changes like eating healthy, being active, and not smoking. This will give you the best chance for a longer, healthier life. Follow-up care is a key part of your treatment and safety. Be sure to make and go to all appointments, and call your doctor if you are having problems. It's also a good idea to know your test results and keep a list of the medicines you take. Where can you learn more? Go to http://gonzaloRetSKUstephan.info/. Enter W757 in the search box to learn more about \"Learning About Percutaneous Coronary Intervention. \"  Current as of: September 21, 2016  Content Version: 11.4  © 9718-3490 [employer]. Care instructions adapted under license by [employer] (which disclaims liability or warranty for this information). If you have questions about a medical condition or this instruction, always ask your healthcare professional. [place] [address] any warranty or liability for your use of this information. Angina: Care Instructions  Your Care Instructions    You have a problem called angina. Angina happens when there is not enough blood flow to your heart muscle. Angina is a sign of coronary artery disease (CAD). CAD occurs when blood vessels that supply the heart become narrowed. Having CAD increases your risk of a heart attack. Chest pain or pressure is the most common symptom of angina. But some people have other symptoms, like:  · Pain, pressure, or a strange feeling in the back, neck, jaw, or upper belly, or in one or both shoulders or arms. · Shortness of breath. · Nausea or vomiting. · Lightheadedness or sudden weakness. · Fast or irregular heartbeat.   Women are somewhat more likely than men to have angina symptoms like shortness of breath, nausea, and back or jaw pain.  Angina can be dangerous. That's why it is important to pay attention to your symptoms. Know what is typical for you, learn how to control your symptoms, and understand when you need to get treatment. A change in your usual pattern of symptoms is an emergency. It may mean that you are having a heart attack. The doctor has checked you carefully, but problems can develop later. If you notice any problems or new symptoms, get medical treatment right away. Follow-up care is a key part of your treatment and safety. Be sure to make and go to all appointments, and call your doctor if you are having problems. It's also a good idea to know your test results and keep a list of the medicines you take. How can you care for yourself at home? Medicines  ? · If your doctor has given you nitroglycerin for angina symptoms, keep it with you at all times. If you have symptoms, sit down and rest, and take the first dose of nitroglycerin as directed. If your symptoms get worse or are not getting better within 5 minutes, call 911 right away. Stay on the phone. The emergency  will give you further instructions. ? · If your doctor advises it, take 1 low-dose aspirin a day to prevent heart attack. ? · Be safe with medicines. Take your medicines exactly as prescribed. Call your doctor if you think you are having a problem with your medicine. You will get more details on the specific medicines your doctor prescribes. ? Lifestyle changes  ? · Do not smoke. If you need help quitting, talk to your doctor about stop-smoking programs and medicines. These can increase your chances of quitting for good. ? · Eat a heart-healthy diet that is low in saturated fat and salt, and is high in fiber. Talk to your doctor or a dietitian about healthy eating. ? · Stay at a healthy weight. Or lose weight if you need to. Activity  ? · Talk to your doctor about a level of activity that is safe for you.    ? · If an activity causes angina symptoms, stop and rest.   When should you call for help? Call 911 anytime you think you may need emergency care. For example, call if:  ? · You passed out (lost consciousness). ? · You have symptoms of a heart attack. These may include:  ¨ Chest pain or pressure, or a strange feeling in the chest.  ¨ Sweating. ¨ Shortness of breath. ¨ Nausea or vomiting. ¨ Pain, pressure, or a strange feeling in the back, neck, jaw, or upper belly or in one or both shoulders or arms. ¨ Lightheadedness or sudden weakness. ¨ A fast or irregular heartbeat. After you call 911, the  may tell you to chew 1 adult-strength or 2 to 4 low-dose aspirin. Wait for an ambulance. Do not try to drive yourself. ? · You have angina symptoms that do not go away with rest or are not getting better within 5 minutes after you take a dose of nitroglycerin. ?Call your doctor now or seek immediate medical care if:  ? · You are having angina symptoms more often than usual, or they are different or worse than usual.   ? · You feel dizzy or lightheaded, or you feel like you may faint. ? Watch closely for changes in your health, and be sure to contact your doctor if you have any problems. Where can you learn more? Go to http://gonzalo-stephan.info/. Enter H129 in the search box to learn more about \"Angina: Care Instructions. \"  Current as of: September 21, 2016  Content Version: 11.4  © 8052-6399 [employer]. Care instructions adapted under license by [employer] (which disclaims liability or warranty for this information). If you have questions about a medical condition or this instruction, always ask your healthcare professional. [place] [address] any warranty or liability for your use of this information. Learning About Coronary Artery Disease (CAD)  What is coronary artery disease?     Coronary artery disease (CAD) occurs when plaque builds up in the arteries that bring oxygen-rich blood to your heart. Plaque is a fatty substance made of cholesterol, calcium, and other substances in the blood. This process is called hardening of the arteries, or atherosclerosis. What happens when you have coronary artery disease? · Plaque may narrow the coronary arteries. Narrowed arteries cause poor blood flow. This can lead to angina symptoms such as chest pain or discomfort. If blood flow is completely blocked, you could have a heart attack. · You can slow CAD and reduce the risk of future problems by making changes in your lifestyle. These include quitting smoking and eating heart-healthy foods. · Treatments for CAD, along with changes in your lifestyle, can help you live a longer and healthier life. How can you prevent coronary artery disease? · Do not smoke. It may be the best thing you can do to prevent heart disease. If you need help quitting, talk to your doctor about stop-smoking programs and medicines. These can increase your chances of quitting for good. · Be active. Get at least 30 minutes of exercise on most days of the week. Walking is a good choice. You also may want to do other activities, such as running, swimming, cycling, or playing tennis or team sports. · Eat heart-healthy foods. Eat more fruits and vegetables and less foods that contain saturated and trans fats. Limit alcohol, sodium, and sweets. · Stay at a healthy weight. Lose weight if you need to. · Manage other health problems such as diabetes, high blood pressure, and high cholesterol. · Manage stress. Stress can hurt your heart. To keep stress low, talk about your problems and feelings. Don't keep your feelings hidden. · If you have talked about it with your doctor, take a low-dose aspirin every day. Aspirin can help certain people lower their risk of a heart attack or stroke. But taking aspirin isn't right for everyone, because it can cause serious bleeding.  Do not start taking daily aspirin unless your doctor knows about it. How is coronary artery disease treated? · Your doctor will suggest that you make lifestyle changes. For example, your doctor may ask you to eat healthy foods, quit smoking, lose extra weight, and be more active. · You will have to take medicines. · Your doctor may suggest a procedure to open narrowed or blocked arteries. This is called angioplasty. Or your doctor may suggest using healthy blood vessels to create detours around narrowed or blocked arteries. This is called bypass surgery. Follow-up care is a key part of your treatment and safety. Be sure to make and go to all appointments, and call your doctor if you are having problems. It's also a good idea to know your test results and keep a list of the medicines you take. Where can you learn more? Go to http://gonzalo-stephan.info/. Enter [phone] in the search box to learn more about \"Learning About Coronary Artery Disease (CAD). \"  Current as of: September 21, 2016  Content Version: 11.4  © 6869-3684 [employer]. Care instructions adapted under license by [employer] (which disclaims liability or warranty for this information). If you have questions about a medical condition or this instruction, always ask your healthcare professional. [place] [address] any warranty or liability for your use of this information. DISCHARGE SUMMARY from Nurse    PATIENT INSTRUCTIONS:    After general anesthesia or intravenous sedation, for 24 hours or while taking prescription Narcotics:  · Limit your activities  · Do not drive and operate hazardous machinery  · Do not make important personal or business decisions  · Do  not drink alcoholic beverages  · If you have not urinated within 8 hours after discharge, please contact your surgeon on call.     Report the following to your surgeon:  · Excessive pain, swelling, redness or odor of or around the surgical area  · Temperature over 100.5  · Nausea and vomiting lasting longer than 4 hours or if unable to take medications  · Any signs of decreased circulation or nerve impairment to extremity: change in color, persistent  numbness, tingling, coldness or increase pain  · Any questions    What to do at Home:  Recommended activity: Activity as tolerated and Activity as tolerated and no driving for today    The patient is being discharged to home on a low saturated fat, low cholesterol diet. Pt is instructed to abstain from any heavy lifting, straining, stooping or driving for 5 days. Pt is instructed to watch groin site ( if groin access was performed) for bleeding/oozing. If so,pt is instructed to apply firm pressure with clean cloth and call office at [phone]. Pt is instructed to watch for signs of infection which include increasing area of redness around site, fever/hot to touch or purulent drainage. Pt is instructed not to soak in a tub bath for 1 week, but it is okay to shower. *  Please give a list of your current medications to your Primary Care Provider. *  Please update this list whenever your medications are discontinued, doses are      changed, or new medications (including over-the-counter products) are added. *  Please carry medication information at all times in case of emergency situations. These are general instructions for a healthy lifestyle:    No smoking/ No tobacco products/ Avoid exposure to second hand smoke  Surgeon General's Warning:  Quitting smoking now greatly reduces serious risk to your health.     Obesity, smoking, and sedentary lifestyle greatly increases your risk for illness    A healthy diet, regular physical exercise & weight monitoring are important for maintaining a healthy lifestyle    You may be retaining fluid if you have a history of heart failure or if you experience any of the following symptoms:  Weight gain of 3 pounds or more overnight or 5 pounds in a week, increased swelling in our hands or feet or shortness of breath while lying flat in bed. Please call your doctor as soon as you notice any of these symptoms; do not wait until your next office visit. Recognize signs and symptoms of STROKE:    F-face looks uneven    A-arms unable to move or move unevenly    S-speech slurred or non-existent    T-time-call 911 as soon as signs and symptoms begin-DO NOT go       Back to bed or wait to see if you get better-TIME IS BRAIN. Warning Signs of HEART ATTACK     Call 911 if you have these symptoms:   Chest discomfort. Most heart attacks involve discomfort in the center of the chest that lasts more than a few minutes, or that goes away and comes back. It can feel like uncomfortable pressure, squeezing, fullness, or pain.  Discomfort in other areas of the upper body. Symptoms can include pain or discomfort in one or both arms, the back, neck, jaw, or stomach.  Shortness of breath with or without chest discomfort.  Other signs may include breaking out in a cold sweat, nausea, or lightheadedness. Don't wait more than five minutes to call 911 - MINUTES MATTER! Fast action can save your life. Calling 911 is almost always the fastest way to get lifesaving treatment. Emergency Medical Services staff can begin treatment when they arrive -- up to an hour sooner than if someone gets to the hospital by car. The discharge information has been reviewed with the patient. The patient verbalized understanding. Discharge medications reviewed with the patient and appropriate educational materials and side effects teaching were provided.   ___________________________________________________________________________________________________________________________________

## 2018-04-25 NOTE — DISCHARGE SUMMARY
Physician Discharge Summary     Patient ID:  Edita Muñiz [facility]  [id number]  57 y.o.  1955    Admit date: 4/24/2018    Discharge date and time: 4/25/18    Admitting Physician: Miguel A Ladd MD     Primary Cardiologist:Dr. Rudi Baird    Primary Care MD:Jakub Miller MD    Discharge Physician: Elian Thomas NP    Admission Diagnoses: Chest pain [R07.9]    Discharge Diagnoses:   Patient Active Problem List    Diagnosis Date Noted    CAD (coronary artery disease) 04/24/2018    Gastroesophageal reflux disease without esophagitis 07/14/2015    Esophageal reflux 03/11/2015    Mixed hyperlipidemia 03/11/2015    Impotence of organic origin 03/11/2015    Calculus of kidney 03/11/2015    Acute prostatitis 03/11/2015           Hospital Course: Edita Muñiz [facility] was admitted to the hospital on 4/24/2018 electively for heart cath with recurring complaints of chest pain. Cardiac catheterization revealed the following:    FINDINGS:  Left ventriculogram done in LITTLE projection shows EF 60%. No gradient on pullback. LVEDP of 13.     Left main arises normally, bifurcates in LAD and circumflex. Left main is large caliber with no significant disease.     LAD courses off the left main towards the apex, supplies 2 proximal diagonals. The proximal through mid LAD has a long area of plaque burden with at least 2 tandem areas of 80% to 90% stenosis. Mid distal LAD has no significant disease.     Circumflex artery in the AV groove is not dominant, but is large caliber and supplies a large bifurcating OM. The mid circ has a 90% focal lesion.     Right coronary artery comes off low off the right cusp and requires an AR MOD to engage. Right is a large dominant vessel supplying a large posterolateral and posterior descending system. The mid right has a 50% stenosis.     The patient was given therapeutic dose of Angiomax and Prowater wire was used to cross into the circumflex.   Direct stenting was performed with a 3.5 x 18 Resolute SHAHIDA to high pressure with excellent results.     Next, the LAD was wired and a 3.0 x 30 stent was placed in the mid LAD and deployed to high pressure. This was followed by a proximal 3.0 x 15 Resolute SHAHIDA deployed to 22 atmospheres. Then the crossover area of overlap was ballooned to 24 atmospheres. There was excellent SABINA 3 flow in the LAD and circumflex after stenting with no residual stenosis.     CONCLUSIONS:  Multivessel stenting inclusive of the LAD and circumflex with tandem 3.0 x 15 and 3.0 x 30 stents in the LAD and a 3.5 x 18 Resolute stent in the circumflex. The patient will be treated with dual antiplatelet therapy.       He was monitored overnight on telemetry. He had no complications. He will be initiated on low dose BB, statin, and DAPT of asa and brilinta. Cardiac rehab referral made. It was discussed with patient the importance of dual platelet therapy, to take this every day and monitor stools for signs and symptoms of GI bleed. Report to us or PCP any dark tarry stools or jv blood in stool. DISPOSITION: The patient is being discharged to home on a low saturated fat, low cholesterol diet. Pt is instructed to abstain from any heavy lifting, straining, stooping or driving for 5 days. Pt is instructed to watch groin site ( if groin access was performed) for bleeding/oozing. If so,pt is instructed to apply firm pressure with clean cloth and call office at [phone]. Pt is instructed to watch for signs of infection which include increasing area of redness around site, fever/hot to touch or purulent drainage. Pt is instructed not to soak in a tub bath for 1 week, but it is okay to shower.          Discharge Exam:     Visit Vitals    /78    Pulse 89    Temp 97.7 °F (36.5 °C)    Resp 20    Ht 6' 1\" (1.854 m)    Wt 107.5 kg (237 lb)    SpO2 94%    BMI 31.27 kg/m2     General Appearance:  Well developed, well nourished,alert and oriented x 3, and individual in no acute distress. Ears/Nose/Mouth/Throat:   Hearing grossly normal.         Neck: Supple. Chest:   Lungs clear to auscultation bilaterally. Cardiovascular:  Regular rate and rhythm, S1, S2 normal, no murmur. Abdomen:   Soft, non-tender, bowel sounds are active. Extremities: No edema bilaterally. Skin: Warm and dry. Final Laboratory Data:  Recent Results (from the past 24 hour(s))   EKG, 12 LEAD, INITIAL    Collection Time: 04/24/18  9:36 AM   Result Value Ref Range    Ventricular Rate 73 BPM    Atrial Rate 73 BPM    P-R Interval 202 ms    QRS Duration 98 ms    Q-T Interval 410 ms    QTC Calculation (Bezet) 451 ms    Calculated P Axis 60 degrees    Calculated R Axis -10 degrees    Calculated T Axis 59 degrees    Diagnosis       Normal sinus rhythm  Normal ECG  When compared with ECG of 24-APR-2018 07:14,  No significant change was found  Confirmed by LAYLA LOCKETT (), Kimberlee Yusuf ([id number]) on 4/24/2018 97:88:48 AM     METABOLIC PANEL, BASIC    Collection Time: 04/25/18  3:51 AM   Result Value Ref Range    Sodium 143 136 - 145 mmol/L    Potassium 3.8 3.5 - 5.1 mmol/L    Chloride 111 (H) 98 - 107 mmol/L    CO2 22 21 - 32 mmol/L    Anion gap 10 7 - 16 mmol/L    Glucose 156 (H) 65 - 100 mg/dL    BUN 11 8 - 23 MG/DL    Creatinine 1.17 0.8 - 1.5 MG/DL    GFR est AA >60 >60 ml/min/1.73m2    GFR est non-AA >60 >60 ml/min/1.73m2    Calcium 8.4 8.3 - 10.4 MG/DL       Disposition: home    Patient Instructions:   Current Discharge Medication List      START taking these medications    Details   aspirin 81 mg chewable tablet Take 1 Tab by mouth daily. Qty: 30 Tab, Refills: 11      rosuvastatin (CRESTOR) 20 mg tablet Take 1 Tab by mouth daily. Qty: 30 Tab, Refills: 6      ticagrelor (BRILINTA) 90 mg tablet Take 1 Tab by mouth every twelve (12) hours every twelve (12) hours. Qty: 60 Tab, Refills: 11      metoprolol succinate (TOPROL-XL) 25 mg XL tablet Take 1 Tab by mouth daily.   Qty: 30 Tab, Refills: 6 nitroglycerin (NITROSTAT) 0.4 mg SL tablet 1 Tab by SubLINGual route every five (5) minutes as needed for Chest Pain. Qty: 1 Bottle, Refills: 3         CONTINUE these medications which have NOT CHANGED    Details   omeprazole (PRILOSEC) 40 mg capsule TAKE ONE CAPSULE BY MOUTH EVERY DAY  Qty: 90 Cap, Refills: 4      glucose blood VI test strips (ONETOUCH VERIO) strip Check BS fasting daily  DX. Diabetes Mellitus E11.9  Qty: 100 Strip, Refills: 3    Associated Diagnoses: Controlled type 2 diabetes mellitus without complication, without long-term current use of insulin (HCC)      lancets (ONETOUCH DELICA LANCETS) 30 gauge misc Testing blood sugars once daily  Dx: E11.9  Qty: 1 Package, Refills: 11      fluticasone-vilanterol (BREO ELLIPTA) 200-25 mcg/dose inhaler Take 1 Puff by inhalation daily. Indications: MAINTENANCE THERAPY FOR ASTHMA  Qty: 1 Inhaler, Refills: 11         STOP taking these medications       aspirin (ASPIRIN) 325 mg tablet Comments:   Reason for Stopping:               Referenced discharge instructions provided by nursing for diet and activity. Follow-up:  Primary Cardiologist:Dr. Carter Portillo  in 2 weeks  PCP: Tiffnay Vásquez MD) in about 4 weeks.     Signed:  Ling Tracy NP  4/25/2018  8:08 AM

## 2018-04-25 NOTE — PROGRESS NOTES
Bedside and Verbal shift change report given to Zhen Pena RN (oncoming nurse) by self Earmon Stella nurse). Report included the following information SBAR, Kardex, MAR and Recent Results.

## 2018-04-28 RX ORDER — CLOPIDOGREL 300 MG/1
300 TABLET, FILM COATED ORAL ONCE
Qty: 1 TAB | Refills: 0 | Status: SHIPPED | OUTPATIENT
Start: 2018-04-28 | End: 2018-04-28

## 2018-04-28 RX ORDER — CLOPIDOGREL BISULFATE 75 MG/1
75 TABLET ORAL DAILY
Qty: 90 TAB | Refills: 3 | Status: SHIPPED | OUTPATIENT
Start: 2018-04-28 | End: 2019-02-11 | Stop reason: SDUPTHER

## 2019-02-13 ENCOUNTER — HOSPITAL ENCOUNTER (OUTPATIENT)
Dept: LAB | Age: 64
Discharge: HOME | End: 2019-02-13
Payer: COMMERCIAL

## 2019-02-13 DIAGNOSIS — I25.118 CORONARY ARTERY DISEASE OF NATIVE ARTERY OF NATIVE HEART WITH STABLE ANGINA PECTORIS (HCC): ICD-10-CM

## 2019-02-13 LAB
ALBUMIN SERPL-MCNC: 4.1 G/DL (ref 3.2–4.6)
ALBUMIN/GLOB SERPL: 1.3 {RATIO} (ref 1.2–3.5)
ALP SERPL-CCNC: 95 U/L (ref 50–136)
ALT SERPL-CCNC: 25 U/L (ref 12–65)
ANION GAP SERPL CALC-SCNC: 9 MMOL/L (ref 7–16)
AST SERPL-CCNC: 18 U/L (ref 15–37)
BASOPHILS # BLD: 0.1 K/UL (ref 0–0.2)
BASOPHILS NFR BLD: 1 % (ref 0–2)
BILIRUB SERPL-MCNC: 0.9 MG/DL (ref 0.2–1.1)
BUN SERPL-MCNC: 12 MG/DL (ref 8–23)
CALCIUM SERPL-MCNC: 8.6 MG/DL (ref 8.3–10.4)
CHLORIDE SERPL-SCNC: 106 MMOL/L (ref 98–107)
CHOLEST SERPL-MCNC: 104 MG/DL
CO2 SERPL-SCNC: 25 MMOL/L (ref 21–32)
CREAT SERPL-MCNC: 1.16 MG/DL (ref 0.8–1.5)
DIFFERENTIAL METHOD BLD: ABNORMAL
EOSINOPHIL # BLD: 0.1 K/UL (ref 0–0.8)
EOSINOPHIL NFR BLD: 2 % (ref 0.5–7.8)
ERYTHROCYTE [DISTWIDTH] IN BLOOD BY AUTOMATED COUNT: 15.1 % (ref 11.9–14.6)
GLOBULIN SER CALC-MCNC: 3.1 G/DL (ref 2.3–3.5)
GLUCOSE SERPL-MCNC: 223 MG/DL (ref 65–100)
HCT VFR BLD AUTO: 56.4 % (ref 41.1–50.3)
HDLC SERPL-MCNC: 21 MG/DL (ref 40–60)
HDLC SERPL: 5 {RATIO}
HGB BLD-MCNC: 18.4 G/DL (ref 13.6–17.2)
IMM GRANULOCYTES # BLD AUTO: 0 K/UL (ref 0–0.5)
IMM GRANULOCYTES NFR BLD AUTO: 0 % (ref 0–5)
LDLC SERPL CALC-MCNC: 30.2 MG/DL
LIPID PROFILE,FLP: ABNORMAL
LYMPHOCYTES # BLD: 2.7 K/UL (ref 0.5–4.6)
LYMPHOCYTES NFR BLD: 36 % (ref 13–44)
MAGNESIUM SERPL-MCNC: 2.3 MG/DL (ref 1.8–2.4)
MCH RBC QN AUTO: 29.7 PG (ref 26.1–32.9)
MCHC RBC AUTO-ENTMCNC: 32.6 G/DL (ref 31.4–35)
MCV RBC AUTO: 91 FL (ref 79.6–97.8)
MONOCYTES # BLD: 0.7 K/UL (ref 0.1–1.3)
MONOCYTES NFR BLD: 9 % (ref 4–12)
NEUTS SEG # BLD: 3.9 K/UL (ref 1.7–8.2)
NEUTS SEG NFR BLD: 52 % (ref 43–78)
NRBC # BLD: 0 K/UL (ref 0–0.2)
PLATELET # BLD AUTO: 208 K/UL (ref 150–450)
PMV BLD AUTO: 10.6 FL (ref 9.4–12.3)
POTASSIUM SERPL-SCNC: 4.2 MMOL/L (ref 3.5–5.1)
PROT SERPL-MCNC: 7.2 G/DL (ref 6.3–8.2)
RBC # BLD AUTO: 6.2 M/UL (ref 4.23–5.6)
SODIUM SERPL-SCNC: 140 MMOL/L (ref 136–145)
TRIGL SERPL-MCNC: 264 MG/DL (ref 35–150)
TSH SERPL DL<=0.005 MIU/L-ACNC: 0.99 UIU/ML (ref 0.36–3.74)
VLDLC SERPL CALC-MCNC: 52.8 MG/DL (ref 6–23)
WBC # BLD AUTO: 7.6 K/UL (ref 4.3–11.1)

## 2019-02-13 PROCEDURE — 85025 COMPLETE CBC W/AUTO DIFF WBC: CPT

## 2019-02-13 PROCEDURE — 80053 COMPREHEN METABOLIC PANEL: CPT

## 2019-02-13 PROCEDURE — 36415 COLL VENOUS BLD VENIPUNCTURE: CPT

## 2019-02-13 PROCEDURE — 84443 ASSAY THYROID STIM HORMONE: CPT

## 2019-02-13 PROCEDURE — 80061 LIPID PANEL: CPT

## 2019-02-13 PROCEDURE — 83735 ASSAY OF MAGNESIUM: CPT

## 2019-02-17 NOTE — PROGRESS NOTES
Patient pre-assessment complete for [facility] poss with DR Starks scheduled for 19 at 8am, arrival time 6am. Patient verified using . Patient instructed to bring all home medications in labeled bottles on the day of procedure. NPO status reinforced. Patient informed to take a full dose aspirin 325mg  or 81 mg x 4 & plavix 75mgon the day of procedure. Patient instructed to HOLD metformin starting tonight & jardiance in am . Instructed they can take all other medications excluding vitamins & supplements. Patient verbalizes understanding of all instructions & denies any questions at this time.

## 2019-02-18 ENCOUNTER — HOSPITAL ENCOUNTER (OUTPATIENT)
Dept: CARDIAC CATH/INVASIVE PROCEDURES | Age: 64
Discharge: HOME | End: 2019-02-18
Attending: INTERNAL MEDICINE | Admitting: INTERNAL MEDICINE
Payer: COMMERCIAL

## 2019-02-18 VITALS
BODY MASS INDEX: 31.14 KG/M2 | TEMPERATURE: 98.2 F | WEIGHT: 235 LBS | RESPIRATION RATE: 18 BRPM | HEIGHT: 73 IN | DIASTOLIC BLOOD PRESSURE: 69 MMHG | HEART RATE: 94 BPM | OXYGEN SATURATION: 95 % | SYSTOLIC BLOOD PRESSURE: 117 MMHG

## 2019-02-18 LAB
ANION GAP SERPL CALC-SCNC: 8 MMOL/L (ref 7–16)
ATRIAL RATE: 85 BPM
BUN SERPL-MCNC: 17 MG/DL (ref 8–23)
CALCIUM SERPL-MCNC: 8.6 MG/DL (ref 8.3–10.4)
CALCULATED P AXIS, ECG09: 57 DEGREES
CALCULATED R AXIS, ECG10: -32 DEGREES
CALCULATED T AXIS, ECG11: 58 DEGREES
CHLORIDE SERPL-SCNC: 110 MMOL/L (ref 98–107)
CO2 SERPL-SCNC: 22 MMOL/L (ref 21–32)
CREAT SERPL-MCNC: 1.12 MG/DL (ref 0.8–1.5)
DIAGNOSIS, 93000: NORMAL
ERYTHROCYTE [DISTWIDTH] IN BLOOD BY AUTOMATED COUNT: 14.3 % (ref 11.9–14.6)
GLUCOSE SERPL-MCNC: 242 MG/DL (ref 65–100)
HCT VFR BLD AUTO: 51.6 % (ref 41.1–50.3)
HGB BLD-MCNC: 17.6 G/DL (ref 13.6–17.2)
INR PPP: 1
MCH RBC QN AUTO: 29.9 PG (ref 26.1–32.9)
MCHC RBC AUTO-ENTMCNC: 34.1 G/DL (ref 31.4–35)
MCV RBC AUTO: 87.6 FL (ref 79.6–97.8)
NRBC # BLD: 0 K/UL (ref 0–0.2)
P-R INTERVAL, ECG05: 184 MS
PLATELET # BLD AUTO: 193 K/UL (ref 150–450)
PMV BLD AUTO: 10.2 FL (ref 9.4–12.3)
POTASSIUM SERPL-SCNC: 4.1 MMOL/L (ref 3.5–5.1)
PROTHROMBIN TIME: 13 SEC (ref 11.7–14.5)
Q-T INTERVAL, ECG07: 396 MS
QRS DURATION, ECG06: 100 MS
QTC CALCULATION (BEZET), ECG08: 471 MS
RBC # BLD AUTO: 5.89 M/UL (ref 4.23–5.6)
SODIUM SERPL-SCNC: 140 MMOL/L (ref 136–145)
VENTRICULAR RATE, ECG03: 85 BPM
WBC # BLD AUTO: 6.6 K/UL (ref 4.3–11.1)

## 2019-02-18 PROCEDURE — 80048 BASIC METABOLIC PNL TOTAL CA: CPT

## 2019-02-18 PROCEDURE — C1893 INTRO/SHEATH, FIXED,NON-PEEL: HCPCS

## 2019-02-18 PROCEDURE — 99152 MOD SED SAME PHYS/QHP 5/>YRS: CPT

## 2019-02-18 PROCEDURE — 93458 L HRT ARTERY/VENTRICLE ANGIO: CPT

## 2019-02-18 PROCEDURE — 74011636320 HC RX REV CODE- 636/320: Performed by: INTERNAL MEDICINE

## 2019-02-18 PROCEDURE — 77030019569 HC BND COMPR RAD TERU -B

## 2019-02-18 PROCEDURE — 85610 PROTHROMBIN TIME: CPT

## 2019-02-18 PROCEDURE — 77030004534 HC CATH ANGI DX INFN CARD -A

## 2019-02-18 PROCEDURE — 93005 ELECTROCARDIOGRAM TRACING: CPT | Performed by: INTERNAL MEDICINE

## 2019-02-18 PROCEDURE — 77030015766

## 2019-02-18 PROCEDURE — 74011000250 HC RX REV CODE- 250: Performed by: INTERNAL MEDICINE

## 2019-02-18 PROCEDURE — 85027 COMPLETE CBC AUTOMATED: CPT

## 2019-02-18 PROCEDURE — C1769 GUIDE WIRE: HCPCS

## 2019-02-18 PROCEDURE — 74011250636 HC RX REV CODE- 250/636

## 2019-02-18 PROCEDURE — 74011250636 HC RX REV CODE- 250/636: Performed by: INTERNAL MEDICINE

## 2019-02-18 RX ORDER — GUAIFENESIN 100 MG/5ML
81-324 LIQUID (ML) ORAL ONCE
Status: DISCONTINUED | OUTPATIENT
Start: 2019-02-18 | End: 2019-02-18 | Stop reason: HOSPADM

## 2019-02-18 RX ORDER — HEPARIN SODIUM 200 [USP'U]/100ML
3 INJECTION, SOLUTION INTRAVENOUS CONTINUOUS
Status: DISCONTINUED | OUTPATIENT
Start: 2019-02-18 | End: 2019-02-18 | Stop reason: HOSPADM

## 2019-02-18 RX ORDER — DIAZEPAM 5 MG/1
5 TABLET ORAL ONCE
Status: DISCONTINUED | OUTPATIENT
Start: 2019-02-18 | End: 2019-02-18 | Stop reason: HOSPADM

## 2019-02-18 RX ORDER — FENTANYL CITRATE 50 UG/ML
25-50 INJECTION, SOLUTION INTRAMUSCULAR; INTRAVENOUS
Status: DISCONTINUED | OUTPATIENT
Start: 2019-02-18 | End: 2019-02-18 | Stop reason: HOSPADM

## 2019-02-18 RX ORDER — MIDAZOLAM HYDROCHLORIDE 1 MG/ML
.5-2 INJECTION, SOLUTION INTRAMUSCULAR; INTRAVENOUS
Status: DISCONTINUED | OUTPATIENT
Start: 2019-02-18 | End: 2019-02-18 | Stop reason: HOSPADM

## 2019-02-18 RX ORDER — SODIUM CHLORIDE 9 MG/ML
75 INJECTION, SOLUTION INTRAVENOUS CONTINUOUS
Status: DISCONTINUED | OUTPATIENT
Start: 2019-02-18 | End: 2019-02-18 | Stop reason: HOSPADM

## 2019-02-18 RX ORDER — LIDOCAINE HYDROCHLORIDE 10 MG/ML
10-200 INJECTION INFILTRATION; PERINEURAL ONCE
Status: COMPLETED | OUTPATIENT
Start: 2019-02-18 | End: 2019-02-18

## 2019-02-18 RX ADMIN — MIDAZOLAM HYDROCHLORIDE 2 MG: 1 INJECTION, SOLUTION INTRAMUSCULAR; INTRAVENOUS at 08:17

## 2019-02-18 RX ADMIN — SODIUM CHLORIDE 75 ML/HR: 900 INJECTION, SOLUTION INTRAVENOUS at 07:02

## 2019-02-18 RX ADMIN — HEPARIN SODIUM 2 ML: 10000 INJECTION INTRAVENOUS; SUBCUTANEOUS at 08:20

## 2019-02-18 RX ADMIN — HEPARIN SODIUM 3 ML/HR: 5000 INJECTION, SOLUTION INTRAVENOUS; SUBCUTANEOUS at 08:10

## 2019-02-18 RX ADMIN — LIDOCAINE HYDROCHLORIDE 3 ML: 10 INJECTION, SOLUTION INFILTRATION; PERINEURAL at 08:20

## 2019-02-18 RX ADMIN — IOPAMIDOL 60 ML: 755 INJECTION, SOLUTION INTRAVENOUS at 08:30

## 2019-02-18 NOTE — DISCHARGE INSTRUCTIONS
HEART CATHETERIZATION/ANGIOGRAPHY DISCHARGE INSTRUCTIONS    1. Check puncture site frequently for swelling or bleeding. If there is any bleeding, lie down and apply pressure over the area with a clean towel or washcloth. Notify your doctor for any redness, swelling, drainage, or oozing from the puncture site. Notify your doctor for any fever or chills. 2. If the extremity becomes cold, numb, or painful call Dr. Sarah Valdivia at [phone].  3. Activity should be limited for the next 48 hours. Do not use your right arm for lifting, pushing, or pulling during this time. 4. You may resume your usual diet. Drink more fluids than usual.  5. Have a responsible person drive you home and stay with you for at least 24 hours after your heart catheterization/angiography. 6. You may remove bandage from your Right wrist in 24 hours. You may shower in 24 hours. No tub baths, hot tubs, or swimming for 1 week. Do not place any lotions, creams, powders, or ointments over puncture site for 1 week. You may place a clean band-aid over the puncture site each day for 5 days. Change daily. 7. Do not drive for 24 hours. I have read the above instructions and have had the opportunity to ask questions.       Patient: ________________________   Date: 2/18/2019    Witness: _______________________   Date: 2/18/2019

## 2019-02-18 NOTE — PROGRESS NOTES
Report received from [address] [place] [address]. Procedural findings communicated. Intra procedural  medication administration reviewed. Progression of care discussed.      Patient received into [address] [facility] 7 post sheath removal.     Access site without bleeding or swelling yes    Dressing dry and intact yes    Patient instructed to limit movement to right upper extremity    Routine post procedural vital signs and site assessment initiated yes

## 2019-02-18 NOTE — PROGRESS NOTES
Patient received to [address] [place] [address] room # 11  Ambulatory from [facility]. Patient scheduled for [facility] today with Dr Rashida Hernandez. Procedure reviewed & questions answered, voiced good understanding consent obtained & placed on chart. All medications and medical history reviewed. Will prep patient per orders. Patient & family updated on plan of care.       The patient has a fraility score of 3-MANAGING WELL, based on reports recent chest pressure and SOB

## 2019-02-18 NOTE — PROGRESS NOTES
TRANSFER - OUT REPORT:    Verbal report given to Celia Kent on [facility]  being transferred to [facility] for routine progression of care       Report consisted of patients Situation, Background, Assessment and Recommendations(SBAR). Information from the following report(s) SBAR, Kardex, Procedure Summary and MAR was reviewed with the receiving nurse. Opportunity for questions and clarification was provided.       [facility] with Dr Peter Vega  No intervention  2 versed  Right radial Rband 12ml at 0830

## 2019-02-18 NOTE — H&P
Maida Arguelles   Medical Student      Progress Notes   Addendum   Encounter Date:  2/11/2019               Expand All Collapse All            []Hide copied text    []Donna for details      [address] [facility], [address] [facility] [address] [place] [address]  PHONE: [phone]     SUBJECTIVE: Jennifer Delvalle (1955) is a 70 y.o. male seen for a follow up visit regarding the following:          ICD-10-CM ICD-9-CM   1. Mixed hyperlipidemia E78.2 272.2   2. Gastroesophageal reflux disease without esophagitis K21.9 530.81   3. Precordial pain R07.2 786.51   4. Coronary artery disease of native artery of native heart with stable angina pectoris ([facility] [address] I25.118 414.01       413.9   5. Dyspnea on exertion R06.09 786.09      Patient states that he gets a heaviness on his chest. He first noticed it about 4 weeks and notices that the pressure is in the middle of his chest and sometimes that can linger on for a few minutes. He denies any chest pain but does note that sometimes when he has the chest pressure he gets some pain in his left arm. He also has been getting more fatigued lately. He denies being diaphoretic. He states that these symptoms are similar to what he was feeling that required the cath in 4/2018 but nowhere near as severe as then. Patient takes Plavix 75mg and doing well with it and tolerating it well with no bleeds.     Last cardiac cath 4/24/18: Multivessel stenting inclusive of the LAD and circumflex with tandem 3.0 x 15 and 3.0 x 30 stents in the LAD and a 3.5 x 18 Resolute stent in the circumflex.     CCC class 3-4     Past Medical History, Past Surgical History, Family history, Social History, and Medications were all reviewed with the patient today and updated as necessary.            Outpatient Medications Marked as Taking for the 2/11/19 encounter (Office Visit) with Dahiana Paez MD   Medication Sig Dispense Refill    clopidogrel (PLAVIX) 75 mg tab Take 1 Tab by mouth daily. 90 Tab 3    metoprolol succinate (TOPROL-XL) 25 mg XL tablet Take 1 Tab by mouth daily. 90 Tab 3    pantoprazole (PROTONIX) 40 mg tablet Take 1 Tab by mouth daily. 90 Tab 3    rosuvastatin (CRESTOR) 20 mg tablet Take 1 Tab by mouth daily. 90 Tab 3    empagliflozin (JARDIANCE) 10 mg tablet Take 1 Tab by mouth daily. 30 Tab 6    metFORMIN ER (GLUCOPHAGE XR) 500 mg tablet Take 2 Tabs by mouth daily (with dinner). To start take 1 tablet for 2weeks then increase to 2 tablets. 60 Tab 6    albuterol (PROVENTIL HFA, VENTOLIN HFA, PROAIR HFA) 90 mcg/actuation inhaler Take 2 Puffs by inhalation.        aspirin 81 mg chewable tablet Take 1 Tab by mouth daily. 30 Tab 11    nitroglycerin (NITROSTAT) 0.4 mg SL tablet 1 Tab by SubLINGual route every five (5) minutes as needed for Chest Pain. 1 Bottle 3    glucose blood VI test strips (ONETOUCH VERIO) strip Check BS fasting daily  DX.  Diabetes Mellitus E11.9 100 Strip 3    lancets (ONETOUCH DELICA LANCETS) 30 gauge misc Testing blood sugars once daily  Dx: E11.9 1 Package 11            Allergies   Allergen Reactions    Brilinta [Ticagrelor] Other (comments)       Chest pain           Past Medical History:   Diagnosis Date    Acute prostatitis 3/11/2015    Esophageal reflux 3/11/2015    GERD (gastroesophageal reflux disease)      Hallux valgus (acquired)      Hematuria, unspecified      Impotence of organic origin 3/11/2015    Other and unspecified hyperlipidemia      Primary localized osteoarthrosis, unspecified site              Past Surgical History:   Procedure Laterality Date    HX CHOLECYSTECTOMY        HX HERNIA REPAIR        HX UROLOGICAL         kidney stone removal            Family History   Problem Relation Age of Onset    Cancer Mother           Liver Cancer      Social History            Tobacco Use    Smoking status: Former Smoker       Packs/day: 0.50       Years: 30.00       Pack years: 15.00       Types: Cigarettes       Last attempt to quit:        Years since quittin.1    Smokeless tobacco: Former User   Substance Use Topics    Alcohol use:  No               ROS:  Review of Systems - General ROS: negative for - chills, fatigue or fever  Hematological and Lymphatic ROS: negative for - bleeding problems, bruising or jaundice  Respiratory ROS: no cough, shortness of breath, or wheezing  Cardiovascular ROS: positive for - dyspnea on exertion and chest pressure  Gastrointestinal ROS: no abdominal pain, change in bowel habits, or black or bloody stools  Neurological ROS: no TIA or stroke symptoms  All other systems negative.            Wt Readings from Last 3 Encounters:   19 230 lb 9.6 oz (104.6 kg)   18 251 lb (113.9 kg)   18 247 lb 12.8 oz (112.4 kg)      Temp Readings from Last 3 Encounters:   18 97.6 °F (36.4 °C)   18 97.8 °F (36.6 °C)   18 97.1 °F (36.2 °C)          BP Readings from Last 3 Encounters:   19 108/76   18 (!) 117/91   18 115/78          Pulse Readings from Last 3 Encounters:   19 84   18 88   18 73               PHYSICAL EXAM:  Visit Vitals  /76 (BP 1 Location: Left arm, BP Patient Position: Sitting)   Pulse 84   Ht 6' 1\" (1.854 m)   Wt 230 lb 9.6 oz (104.6 kg)   BMI 30.42 kg/m²         Physical Examination: General appearance - alert, well appearing, and in no distress  Mental status - alert, oriented to person, place, and time  Eyes - pupils equal and reactive, extraocular eye movements intact  Neck/lymph - supple, no significant adenopathy  Chest/lungs - clear to auscultation, no wheezes, rales or rhonchi, symmetric air entry  Heart/CV - normal rate, regular rhythm, normal S1, S2, no murmurs, rubs, clicks or gallops  Abdomen/GI - soft, nontender, nondistended, no masses or organomegaly  Musculoskeletal - no joint tenderness, deformity or swelling  Extremities - peripheral pulses normal, no pedal edema, no clubbing or cyanosis  Skin - normal coloration and turgor, no rashes, no suspicious skin lesions noted     EKG review nsr     Recent Results   No results found for this or any previous visit (from the past 672 hour(s)). Lab Results   Component Value Date/Time     Cholesterol, total 115 07/10/2018 08:12 AM     HDL Cholesterol 26 (L) 07/10/2018 08:12 AM     LDL, calculated 57 07/10/2018 08:12 AM     VLDL, calculated 32 07/10/2018 08:12 AM     Triglyceride 160 (H) 07/10/2018 08:12 AM     CHOL/HDL Ratio 7.6 04/20/2018 10:27 AM            ASSESSMENT and PLAN           1. Mixed hyperlipidemia  The current medical regimen is effective;  continue present plan and medications.        2. Gastroesophageal reflux disease without esophagitis  The current medical regimen is effective;  continue present plan and medications.        3. Precordial pain  The current medical regimen is effective;  continue present plan and medications.        4. Coronary artery disease of native artery of native heart with stable angina pectoris ([address]  The current medical regimen is effective;  continue present plan and medications.        5. Dyspnea on exertion  The current medical regimen is effective;  continue present plan and medications.     Pt set up for procedure. Risks benefits and alternatives discussed. Pt agrees to proceed. Risks of bleeding infection emergent surgical procedure loss of life or limb renal failure and other known risks discussed. Pt agrees to proceed and agrees to sign consent form.        Needs another cath due to increased symptoms. ccc 3-4 angina.  Can walk to truck in parking lot and gets angina.            Orders Placed This Encounter    CBC WITH AUTOMATED DIFF       Standing Status:   Future       Standing Expiration Date:   3/92/1383    METABOLIC PANEL, COMPREHENSIVE       Standing Status:   Future       Standing Expiration Date:   8/12/2019    TSH 3RD GENERATION       Standing Status:   Future       Standing Expiration Date:   8/12/2019  MAGNESIUM       Standing Status:   Future       Standing Expiration Date:   8/12/2019    LIPID PANEL       Fasting sample requested       Standing Status:   Future       Standing Expiration Date:   8/12/2019    LEFT HEART CATH       Standing Status:   Future       Standing Expiration Date:   8/14/2019       Scheduling Instructions:         WITH POSSIBLE PCI         CPT = 18310                   MEDS TO HOLD PRIOR TO CATH:          COUMADIN: N/A          Lilliam Rojas / Jeannine Steiner / Nadya Buckley: N/A          EFFIENT / Elfiting Cogan / Carly Proud: N/A       Order Specific Question:   Reason for Exam:       Answer:   See diagnosis    clopidogrel (PLAVIX) 75 mg tab       Sig: Take 1 Tab by mouth daily.       Dispense:  90 Tab       Refill:  3    metoprolol succinate (TOPROL-XL) 25 mg XL tablet       Sig: Take 1 Tab by mouth daily.       Dispense:  90 Tab       Refill:  3    pantoprazole (PROTONIX) 40 mg tablet       Sig: Take 1 Tab by mouth daily.       Dispense:  90 Tab       Refill:  3       Patient on long term Plavix    rosuvastatin (CRESTOR) 20 mg tablet       Sig: Take 1 Tab by mouth daily.       Dispense:  90 Tab       Refill:  3         Follow-up Disposition:  Return for after testing.                 2/11/2019  2:47 PM                Electronically signed by Laila Gonzalez at 02/11/19 1295  Electronically signed by Lauri Case MD at 02/11/19 1500   Note Details     Author Laila Gonzalez File Time 02/11/19 1505   Author Type Medical Student Status Addendum   Last  Lauri Case MD Service (none)       Office Visit on 2/11/2019            Revision History            Detailed Report

## 2019-06-10 PROBLEM — M24.472: Status: ACTIVE | Noted: 2018-08-30

## 2019-06-10 PROBLEM — Z98.890 S/P BUNIONECTOMY: Status: ACTIVE | Noted: 2018-11-14

## 2020-06-09 ENCOUNTER — APPOINTMENT (OUTPATIENT)
Dept: CT IMAGING | Age: 65
DRG: 270 | End: 2020-06-09
Attending: FAMILY MEDICINE
Payer: COMMERCIAL

## 2020-06-09 ENCOUNTER — HOSPITAL ENCOUNTER (OUTPATIENT)
Dept: ULTRASOUND IMAGING | Age: 65
Discharge: HOME | DRG: 270 | End: 2020-06-09
Attending: INTERNAL MEDICINE
Payer: COMMERCIAL

## 2020-06-09 ENCOUNTER — HOSPITAL ENCOUNTER (INPATIENT)
Age: 65
LOS: 1 days | Discharge: ANOTHER HOSPITAL | DRG: 270 | End: 2020-06-10
Attending: FAMILY MEDICINE | Admitting: FAMILY MEDICINE
Payer: COMMERCIAL

## 2020-06-09 VITALS
TEMPERATURE: 98.2 F | SYSTOLIC BLOOD PRESSURE: 111 MMHG | OXYGEN SATURATION: 92 % | HEART RATE: 94 BPM | DIASTOLIC BLOOD PRESSURE: 78 MMHG | RESPIRATION RATE: 16 BRPM

## 2020-06-09 DIAGNOSIS — R06.09 DYSPNEA ON EXERTION: ICD-10-CM

## 2020-06-09 PROBLEM — E11.9 DM TYPE 2 (DIABETES MELLITUS, TYPE 2) (HCC): Status: ACTIVE | Noted: 2020-06-09

## 2020-06-09 PROBLEM — I82.409 DVT OF LOWER LIMB, ACUTE (HCC): Status: ACTIVE | Noted: 2020-06-09

## 2020-06-09 PROBLEM — I26.99 BILATERAL PULMONARY EMBOLISM (HCC): Status: ACTIVE | Noted: 2020-06-09

## 2020-06-09 LAB
ALBUMIN SERPL-MCNC: 3.8 G/DL (ref 3.2–4.6)
ALBUMIN/GLOB SERPL: 1.2 {RATIO} (ref 1.2–3.5)
ALP SERPL-CCNC: 88 U/L (ref 50–136)
ALT SERPL-CCNC: 25 U/L (ref 12–65)
ANION GAP SERPL CALC-SCNC: 8 MMOL/L (ref 7–16)
APTT PPP: 25 SEC (ref 24.3–35.4)
AST SERPL-CCNC: 11 U/L (ref 15–37)
BASOPHILS # BLD: 0.1 K/UL (ref 0–0.2)
BASOPHILS NFR BLD: 1 % (ref 0–2)
BILIRUB SERPL-MCNC: 0.9 MG/DL (ref 0.2–1.1)
BUN SERPL-MCNC: 13 MG/DL (ref 8–23)
CALCIUM SERPL-MCNC: 8.5 MG/DL (ref 8.3–10.4)
CHLORIDE SERPL-SCNC: 105 MMOL/L (ref 98–107)
CO2 SERPL-SCNC: 25 MMOL/L (ref 21–32)
CREAT SERPL-MCNC: 0.97 MG/DL (ref 0.8–1.5)
DIFFERENTIAL METHOD BLD: ABNORMAL
EOSINOPHIL # BLD: 0.1 K/UL (ref 0–0.8)
EOSINOPHIL NFR BLD: 1 % (ref 0.5–7.8)
ERYTHROCYTE [DISTWIDTH] IN BLOOD BY AUTOMATED COUNT: 13.5 % (ref 11.9–14.6)
EST. AVERAGE GLUCOSE BLD GHB EST-MCNC: 292 MG/DL
GLOBULIN SER CALC-MCNC: 3.1 G/DL (ref 2.3–3.5)
GLUCOSE BLD STRIP.AUTO-MCNC: 234 MG/DL (ref 65–100)
GLUCOSE SERPL-MCNC: 225 MG/DL (ref 65–100)
HBA1C MFR BLD: 11.8 %
HCT VFR BLD AUTO: 51.4 % (ref 41.1–50.3)
HGB BLD-MCNC: 17.3 G/DL (ref 13.6–17.2)
IMM GRANULOCYTES # BLD AUTO: 0.1 K/UL (ref 0–0.5)
IMM GRANULOCYTES NFR BLD AUTO: 1 % (ref 0–5)
INR PPP: 1
LYMPHOCYTES # BLD: 2 K/UL (ref 0.5–4.6)
LYMPHOCYTES NFR BLD: 27 % (ref 13–44)
MCH RBC QN AUTO: 30.1 PG (ref 26.1–32.9)
MCHC RBC AUTO-ENTMCNC: 33.7 G/DL (ref 31.4–35)
MCV RBC AUTO: 89.5 FL (ref 79.6–97.8)
MONOCYTES # BLD: 0.7 K/UL (ref 0.1–1.3)
MONOCYTES NFR BLD: 9 % (ref 4–12)
NEUTS SEG # BLD: 4.6 K/UL (ref 1.7–8.2)
NEUTS SEG NFR BLD: 62 % (ref 43–78)
NRBC # BLD: 0 K/UL (ref 0–0.2)
PLATELET # BLD AUTO: 173 K/UL (ref 150–450)
PMV BLD AUTO: 9.5 FL (ref 9.4–12.3)
POTASSIUM SERPL-SCNC: 3.9 MMOL/L (ref 3.5–5.1)
PROT SERPL-MCNC: 6.9 G/DL (ref 6.3–8.2)
PROTHROMBIN TIME: 14 SEC (ref 12–14.7)
RBC # BLD AUTO: 5.74 M/UL (ref 4.23–5.6)
SODIUM SERPL-SCNC: 138 MMOL/L (ref 136–145)
WBC # BLD AUTO: 7.5 K/UL (ref 4.3–11.1)

## 2020-06-09 PROCEDURE — 93005 ELECTROCARDIOGRAM TRACING: CPT | Performed by: FAMILY MEDICINE

## 2020-06-09 PROCEDURE — 36415 COLL VENOUS BLD VENIPUNCTURE: CPT

## 2020-06-09 PROCEDURE — 74011000302 HC RX REV CODE- 302: Performed by: FAMILY MEDICINE

## 2020-06-09 PROCEDURE — 65270000029 HC RM PRIVATE

## 2020-06-09 PROCEDURE — 93971 EXTREMITY STUDY: CPT

## 2020-06-09 PROCEDURE — 80053 COMPREHEN METABOLIC PANEL: CPT

## 2020-06-09 PROCEDURE — 85730 THROMBOPLASTIN TIME PARTIAL: CPT

## 2020-06-09 PROCEDURE — 86580 TB INTRADERMAL TEST: CPT | Performed by: FAMILY MEDICINE

## 2020-06-09 PROCEDURE — 65660000000 HC RM CCU STEPDOWN

## 2020-06-09 PROCEDURE — 83036 HEMOGLOBIN GLYCOSYLATED A1C: CPT

## 2020-06-09 PROCEDURE — 74011250637 HC RX REV CODE- 250/637: Performed by: FAMILY MEDICINE

## 2020-06-09 PROCEDURE — 74011636320 HC RX REV CODE- 636/320: Performed by: FAMILY MEDICINE

## 2020-06-09 PROCEDURE — 74011636637 HC RX REV CODE- 636/637: Performed by: FAMILY MEDICINE

## 2020-06-09 PROCEDURE — 85025 COMPLETE CBC W/AUTO DIFF WBC: CPT

## 2020-06-09 PROCEDURE — 82962 GLUCOSE BLOOD TEST: CPT

## 2020-06-09 PROCEDURE — 71260 CT THORAX DX C+: CPT

## 2020-06-09 PROCEDURE — 85610 PROTHROMBIN TIME: CPT

## 2020-06-09 PROCEDURE — 74011250636 HC RX REV CODE- 250/636: Performed by: FAMILY MEDICINE

## 2020-06-09 PROCEDURE — 74011000258 HC RX REV CODE- 258: Performed by: FAMILY MEDICINE

## 2020-06-09 RX ORDER — SODIUM CHLORIDE 0.9 % (FLUSH) 0.9 %
5-40 SYRINGE (ML) INJECTION AS NEEDED
Status: CANCELLED | OUTPATIENT
Start: 2020-06-09

## 2020-06-09 RX ORDER — ADHESIVE BANDAGE
30 BANDAGE TOPICAL DAILY PRN
Status: CANCELLED | OUTPATIENT
Start: 2020-06-09

## 2020-06-09 RX ORDER — DEXTROSE 40 %
15 GEL (GRAM) ORAL AS NEEDED
Status: DISCONTINUED | OUTPATIENT
Start: 2020-06-09 | End: 2020-06-10 | Stop reason: HOSPADM

## 2020-06-09 RX ORDER — HEPARIN SODIUM 5000 [USP'U]/100ML
18-36 INJECTION, SOLUTION INTRAVENOUS
Status: CANCELLED | OUTPATIENT
Start: 2020-06-09

## 2020-06-09 RX ORDER — NITROGLYCERIN 0.4 MG/1
0.4 TABLET SUBLINGUAL
Status: CANCELLED | OUTPATIENT
Start: 2020-06-09

## 2020-06-09 RX ORDER — SODIUM CHLORIDE 0.9 % (FLUSH) 0.9 %
5-40 SYRINGE (ML) INJECTION AS NEEDED
Status: DISCONTINUED | OUTPATIENT
Start: 2020-06-09 | End: 2020-06-10 | Stop reason: HOSPADM

## 2020-06-09 RX ORDER — ONDANSETRON 2 MG/ML
4 INJECTION INTRAMUSCULAR; INTRAVENOUS
Status: DISCONTINUED | OUTPATIENT
Start: 2020-06-09 | End: 2020-06-09

## 2020-06-09 RX ORDER — INSULIN LISPRO 100 [IU]/ML
INJECTION, SOLUTION INTRAVENOUS; SUBCUTANEOUS
Status: DISCONTINUED | OUTPATIENT
Start: 2020-06-09 | End: 2020-06-10 | Stop reason: HOSPADM

## 2020-06-09 RX ORDER — NALOXONE HYDROCHLORIDE 0.4 MG/ML
0.4 INJECTION, SOLUTION INTRAMUSCULAR; INTRAVENOUS; SUBCUTANEOUS AS NEEDED
Status: DISCONTINUED | OUTPATIENT
Start: 2020-06-09 | End: 2020-06-10 | Stop reason: HOSPADM

## 2020-06-09 RX ORDER — SODIUM CHLORIDE 0.9 % (FLUSH) 0.9 %
5-40 SYRINGE (ML) INJECTION EVERY 8 HOURS
Status: CANCELLED | OUTPATIENT
Start: 2020-06-10

## 2020-06-09 RX ORDER — MORPHINE SULFATE 2 MG/ML
1 INJECTION, SOLUTION INTRAMUSCULAR; INTRAVENOUS
Status: CANCELLED | OUTPATIENT
Start: 2020-06-09

## 2020-06-09 RX ORDER — PANTOPRAZOLE SODIUM 40 MG/1
40 TABLET, DELAYED RELEASE ORAL
Status: DISCONTINUED | OUTPATIENT
Start: 2020-06-09 | End: 2020-06-10 | Stop reason: HOSPADM

## 2020-06-09 RX ORDER — HEPARIN SODIUM 5000 [USP'U]/100ML
18-36 INJECTION, SOLUTION INTRAVENOUS
Status: DISCONTINUED | OUTPATIENT
Start: 2020-06-09 | End: 2020-06-10 | Stop reason: HOSPADM

## 2020-06-09 RX ORDER — ALBUTEROL SULFATE 0.83 MG/ML
2.5 SOLUTION RESPIRATORY (INHALATION)
Status: CANCELLED | OUTPATIENT
Start: 2020-06-09

## 2020-06-09 RX ORDER — INSULIN LISPRO 100 [IU]/ML
INJECTION, SOLUTION INTRAVENOUS; SUBCUTANEOUS
Status: CANCELLED | OUTPATIENT
Start: 2020-06-10

## 2020-06-09 RX ORDER — DEXTROSE 50 % IN WATER (D50W) INTRAVENOUS SYRINGE
25-50 AS NEEDED
Status: CANCELLED | OUTPATIENT
Start: 2020-06-09

## 2020-06-09 RX ORDER — PANTOPRAZOLE SODIUM 40 MG/1
40 TABLET, DELAYED RELEASE ORAL
Status: CANCELLED | OUTPATIENT
Start: 2020-06-10

## 2020-06-09 RX ORDER — NALOXONE HYDROCHLORIDE 0.4 MG/ML
0.4 INJECTION, SOLUTION INTRAMUSCULAR; INTRAVENOUS; SUBCUTANEOUS AS NEEDED
Status: CANCELLED | OUTPATIENT
Start: 2020-06-09

## 2020-06-09 RX ORDER — HEPARIN SODIUM 5000 [USP'U]/ML
80 INJECTION, SOLUTION INTRAVENOUS; SUBCUTANEOUS ONCE
Status: COMPLETED | OUTPATIENT
Start: 2020-06-09 | End: 2020-06-09

## 2020-06-09 RX ORDER — ACETAMINOPHEN 325 MG/1
650 TABLET ORAL
Status: CANCELLED | OUTPATIENT
Start: 2020-06-09

## 2020-06-09 RX ORDER — DIPHENHYDRAMINE HYDROCHLORIDE 50 MG/ML
12.5 INJECTION, SOLUTION INTRAMUSCULAR; INTRAVENOUS
Status: DISCONTINUED | OUTPATIENT
Start: 2020-06-09 | End: 2020-06-10 | Stop reason: HOSPADM

## 2020-06-09 RX ORDER — ADHESIVE BANDAGE
30 BANDAGE TOPICAL DAILY PRN
Status: DISCONTINUED | OUTPATIENT
Start: 2020-06-09 | End: 2020-06-10 | Stop reason: HOSPADM

## 2020-06-09 RX ORDER — SODIUM CHLORIDE 9 MG/ML
75 INJECTION, SOLUTION INTRAVENOUS CONTINUOUS
Status: DISCONTINUED | OUTPATIENT
Start: 2020-06-09 | End: 2020-06-10 | Stop reason: HOSPADM

## 2020-06-09 RX ORDER — MORPHINE SULFATE 2 MG/ML
1 INJECTION, SOLUTION INTRAMUSCULAR; INTRAVENOUS
Status: DISCONTINUED | OUTPATIENT
Start: 2020-06-09 | End: 2020-06-10 | Stop reason: HOSPADM

## 2020-06-09 RX ORDER — HYDROCODONE BITARTRATE AND ACETAMINOPHEN 5; 325 MG/1; MG/1
1 TABLET ORAL
Status: CANCELLED | OUTPATIENT
Start: 2020-06-09

## 2020-06-09 RX ORDER — HYDROCODONE BITARTRATE AND ACETAMINOPHEN 5; 325 MG/1; MG/1
1 TABLET ORAL
Status: DISCONTINUED | OUTPATIENT
Start: 2020-06-09 | End: 2020-06-10 | Stop reason: HOSPADM

## 2020-06-09 RX ORDER — DEXTROSE 50 % IN WATER (D50W) INTRAVENOUS SYRINGE
25-50 AS NEEDED
Status: DISCONTINUED | OUTPATIENT
Start: 2020-06-09 | End: 2020-06-10 | Stop reason: HOSPADM

## 2020-06-09 RX ORDER — ACETAMINOPHEN 325 MG/1
650 TABLET ORAL
Status: DISCONTINUED | OUTPATIENT
Start: 2020-06-09 | End: 2020-06-10 | Stop reason: HOSPADM

## 2020-06-09 RX ORDER — SODIUM CHLORIDE 0.9 % (FLUSH) 0.9 %
10 SYRINGE (ML) INJECTION
Status: COMPLETED | OUTPATIENT
Start: 2020-06-09 | End: 2020-06-09

## 2020-06-09 RX ORDER — DIPHENHYDRAMINE HYDROCHLORIDE 50 MG/ML
12.5 INJECTION, SOLUTION INTRAMUSCULAR; INTRAVENOUS
Status: CANCELLED | OUTPATIENT
Start: 2020-06-09

## 2020-06-09 RX ORDER — SODIUM CHLORIDE 9 MG/ML
75 INJECTION, SOLUTION INTRAVENOUS CONTINUOUS
Status: CANCELLED | OUTPATIENT
Start: 2020-06-09

## 2020-06-09 RX ORDER — SODIUM CHLORIDE 0.9 % (FLUSH) 0.9 %
5-40 SYRINGE (ML) INJECTION EVERY 8 HOURS
Status: DISCONTINUED | OUTPATIENT
Start: 2020-06-09 | End: 2020-06-10 | Stop reason: HOSPADM

## 2020-06-09 RX ORDER — DEXTROSE 40 %
15 GEL (GRAM) ORAL AS NEEDED
Status: CANCELLED | OUTPATIENT
Start: 2020-06-09

## 2020-06-09 RX ADMIN — INSULIN LISPRO 4 UNITS: 100 INJECTION, SOLUTION INTRAVENOUS; SUBCUTANEOUS at 21:23

## 2020-06-09 RX ADMIN — IOPAMIDOL 100 ML: 755 INJECTION, SOLUTION INTRAVENOUS at 22:28

## 2020-06-09 RX ADMIN — HEPARIN SODIUM 7650 UNITS: 5000 INJECTION INTRAVENOUS; SUBCUTANEOUS at 21:08

## 2020-06-09 RX ADMIN — HEPARIN SODIUM 18 UNITS/KG/HR: 5000 INJECTION, SOLUTION INTRAVENOUS at 21:10

## 2020-06-09 RX ADMIN — Medication 10 ML: at 22:28

## 2020-06-09 RX ADMIN — HYDROCODONE BITARTRATE AND ACETAMINOPHEN 1 TABLET: 5; 325 TABLET ORAL at 21:33

## 2020-06-09 RX ADMIN — SODIUM CHLORIDE 100 ML: 900 INJECTION, SOLUTION INTRAVENOUS at 22:28

## 2020-06-09 RX ADMIN — SODIUM CHLORIDE 75 ML/HR: 900 INJECTION, SOLUTION INTRAVENOUS at 23:33

## 2020-06-09 RX ADMIN — PANTOPRAZOLE SODIUM 40 MG: 40 TABLET, DELAYED RELEASE ORAL at 21:19

## 2020-06-09 RX ADMIN — Medication 10 ML: at 21:19

## 2020-06-09 RX ADMIN — TUBERCULIN PURIFIED PROTEIN DERIVATIVE 5 UNITS: 5 INJECTION, SOLUTION INTRADERMAL at 21:22

## 2020-06-09 NOTE — H&P
Hospitalist H&P Note     Admit Date:  2020  7:39 PM   Name:  Nik Oliveira [facility]   Age:  77 y.o.  :  1955   MRN:  [id number]   PCP:  Ashwini Ornelas MD  Treatment Team: Attending Provider: Jaime Salgado MD  Left leg swelling and pain since this morning  HPI:   57-year-old  male patient with a known history of coronary disease, last stent about 2 years ago, Plavix discontinued by cardiologist since a year ago, presently only on aspirin. Known history of diabetes mellitus type 2 on metformin and Jardiance, acid reflux on Protonix, hypertension,? COPD and hyperlipidemia. According to wife patient only takes Protonix, metformin, Jardiance, aspirin. He does not take these medications daily. Patient says since past 2 weeks he started noticing shortness of breath on walking. When I asked him if he could give an example, approximately how how far if he walks he would get short of breath, patient says from his room to the parking lot. Did not complain of any chest pain or headache or any dizziness with shortness of breath. This morning he woke up with left leg pain, mostly over the popliteal region, noticed to have left leg swelling. Pain on walking. He was seen by cardiology, Dr. Raissa Zee in his office, was sent to [place] radiology department for an outpatient left lower extremity venous Doppler. Patient was found to have-Positive deep venous thrombosis in the left femoral, popliteal,posterior tibial and peroneal  Veins. Hospital service was consulted to admit the patient. Apart from pain mostly over the left popliteal region and left leg swelling since this morning, dyspnea on walking going on for past 2 weeks, of the 10 review of systems apart from the one mentioned above patient other review of systems were negative noncontributory. Labs pending. Patient will be admitted for extensive DVT of left lower extremity.     10 systems reviewed and negative except as noted in HPI. Past Medical History:   Diagnosis Date    Acute prostatitis 3/11/2015    CAD (coronary artery disease)     Diabetes ([facility]     DVT of lower limb, acute ([facility] 2020    Esophageal reflux 3/11/2015    GERD (gastroesophageal reflux disease)     Hallux valgus (acquired)     Hematuria, unspecified     Impotence of organic origin 3/11/2015    Other and unspecified hyperlipidemia     Primary localized osteoarthrosis, unspecified site       Past Surgical History:   Procedure Laterality Date    CARDIAC SURG PROCEDURE UNLIST      HX CHOLECYSTECTOMY      HX HEART CATHETERIZATION  2019    LV:EF=65%. LVEDP=21. LM:nl. LAD:patent stents. DX 1&Dx 2 are patent. LCX:patent stents. RCA:20-30% mid stenosis. Slow filling c/w endothelial dysfunction described.  HX HERNIA REPAIR      HX UROLOGICAL      kidney stone removal      Allergies   Allergen Reactions    Brilinta [Ticagrelor] Other (comments)     Chest pain      Social History     Tobacco Use    Smoking status: Former Smoker     Packs/day: 0.50     Years: 30.00     Pack years: 15.00     Types: Cigarettes     Last attempt to quit:      Years since quittin.4    Smokeless tobacco: Former User   Substance Use Topics    Alcohol use: No      Family History   Problem Relation Age of Onset    Cancer Mother         Liver Cancer      Immunization History   Administered Date(s) Administered    Td 2004    Tdap 2015     PTA Medications:  Prior to Admission Medications   Prescriptions Last Dose Informant Patient Reported? Taking? albuterol (PROVENTIL HFA, VENTOLIN HFA, PROAIR HFA) 90 mcg/actuation inhaler   Yes No   Sig: Take 2 Puffs by inhalation every four (4) hours as needed. aspirin 81 mg chewable tablet   No No   Sig: Take 1 Tab by mouth daily. clopidogrel (PLAVIX) 75 mg tab   No No   Sig: Take 1 Tab by mouth daily. empagliflozin (JARDIANCE) 25 mg tablet   No No   Sig: Take 1 Tab by mouth daily.    Patient taking differently: Take 50 mg by mouth daily. losartan (COZAAR) 50 mg tablet   No No   Sig: Take 1 Tab by mouth daily. metFORMIN ER (GLUCOPHAGE XR) 500 mg tablet   No No   Sig: Take 4 Tabs by mouth daily (with dinner). Patient taking differently: Take 1,500 mg by mouth daily (with dinner). nitroglycerin (NITROSTAT) 0.4 mg SL tablet   No No   Si Tab by SubLINGual route every five (5) minutes as needed for Chest Pain.   pantoprazole (PROTONIX) 40 mg tablet   No No   Sig: Take 1 Tab by mouth daily. rosuvastatin (CRESTOR) 20 mg tablet   No No   Sig: Take 1 Tab by mouth daily. Facility-Administered Medications: None       Objective:   No data found. No intake or output data in the 24 hours ending 20    Physical Exam:  General:    Well nourished. Alert. Eyes:   Normal sclera. Extraocular movements intact. ENT:  Normocephalic, atraumatic. Moist mucous membranes  CV:   RRR. No murmur, rub, or gallop. Lungs:  CTAB. No wheezing, rhonchi, or rales. Abdomen: Soft, nontender, nondistended. Bowel sounds normal.   Extremities: Warm and dry. No cyanosis or edema. Neurologic: CN II-XII grossly intact. Sensation intact. Skin:     No rashes or jaundice. Psych:  Normal mood and affect. I reviewed the labs, imaging, EKGs, telemetry, and other studies done this admission. Data Review:   No results found for this or any previous visit (from the past 24 hour(s)). All Micro Results     None          Other Studies:  Duplex Lower Ext Venous Left    Result Date: 2020  Left lower extremity venous duplex exam. CLINICAL INDICATION: Moderate left leg swelling and pain for one day. PROCEDURE: Real-time grayscale, color, and spectral Doppler analysis of the left lower extremity deep venous system from the common femoral vein through the posterior tibial and peroneal veins. COMPARISON: No prior.  FINDINGS: Occlusive thrombus is noted in the femoral, popliteal, posterior tibial and peroneal veins in keeping with deep venous thrombosis. The common femoral and profunda veins are patent. IMPRESSION: Positive deep venous thrombosis in the left femoral, popliteal, posterior tibial and peroneal veins. The important findings were called to the referring provider Dr. Laura Vásquez by the performing sonographer on 6/9/2020 at roughly 7:00 PM.      Assessment and Plan:     Hospital Problems as of 6/9/2020 Date Reviewed: 6/11/2019          Codes Class Noted - Resolved POA    DM type 2 (diabetes mellitus, type 2) ([facility] ICD-10-CM: E11.9  ICD-9-CM: 250.00  6/9/2020 - Present Unknown        * (Principal) DVT of lower limb, acute ([facility] ICD-10-CM: I82.409  ICD-9-CM: 453.40  6/9/2020 - Present Unknown        CAD (coronary artery disease) ICD-10-CM: I25.10  ICD-9-CM: 414.00  4/24/2018 - Present Unknown        Gastroesophageal reflux disease without esophagitis ICD-10-CM: K21.9  ICD-9-CM: 530.81  7/14/2015 - Present Yes        Mixed hyperlipidemia ICD-10-CM: E78.2  ICD-9-CM: 272.2  3/11/2015 - Present Yes              PLAN:  -Extensive left lower extremity DVT with the swelling left leg--we will start the patient on heparin drip. Once I have the labs if his kidney function is normal I would also order a chest with contrast to rule out PE. Baseline EKG has been ordered. We will also order an echo. Bedrest.  PRN pain medication.  -Diabetes mellitus type 2-we will hold metformin and Jardiance. We will start the patient on insulin sliding scale.  -Coronary disease with a history of stent, last stent about 2 years ago. Presently on aspirin.  -Acid reflux-continue Protonix    Advance life care discussed with patient, patient is full code.     DVT ppx: Heparin  Anticipated DC needs:    Code status:  Full  Estimated LOS:  Greater than 2 midnights  Risk:  high    Signed:  Abhijit Hancock MD

## 2020-06-09 NOTE — PROGRESS NOTES
Bed: ED09  Expected date:   Expected time:   Means of arrival:   Comments:  Stab 1   Need to start treatment for acute DVT

## 2020-06-10 ENCOUNTER — HOSPITAL ENCOUNTER (INPATIENT)
Age: 65
LOS: 1 days | Discharge: HOME | DRG: 270 | End: 2020-06-11
Attending: HOSPITALIST | Admitting: FAMILY MEDICINE
Payer: COMMERCIAL

## 2020-06-10 ENCOUNTER — APPOINTMENT (OUTPATIENT)
Dept: INTERVENTIONAL RADIOLOGY/VASCULAR | Age: 65
DRG: 270 | End: 2020-06-10
Attending: FAMILY MEDICINE
Payer: COMMERCIAL

## 2020-06-10 ENCOUNTER — PATIENT OUTREACH (OUTPATIENT)
Dept: CASE MANAGEMENT | Age: 65
End: 2020-06-10

## 2020-06-10 DIAGNOSIS — I82.402 ACUTE DEEP VEIN THROMBOSIS (DVT) OF LEFT LOWER EXTREMITY, UNSPECIFIED VEIN (HCC): Primary | ICD-10-CM

## 2020-06-10 DIAGNOSIS — I82.409 DVT (DEEP VENOUS THROMBOSIS) (HCC): ICD-10-CM

## 2020-06-10 LAB
ANION GAP SERPL CALC-SCNC: 5 MMOL/L (ref 7–16)
APTT PPP: 111.6 SEC (ref 24.3–35.4)
APTT PPP: 27.1 SEC (ref 24.3–35.4)
APTT PPP: 87 SEC (ref 24.3–35.4)
ATRIAL RATE: 88 BPM
BASOPHILS # BLD: 0.1 K/UL (ref 0–0.2)
BASOPHILS NFR BLD: 1 % (ref 0–2)
BUN SERPL-MCNC: 12 MG/DL (ref 8–23)
CALCIUM SERPL-MCNC: 7.8 MG/DL (ref 8.3–10.4)
CALCULATED P AXIS, ECG09: 63 DEGREES
CALCULATED R AXIS, ECG10: -40 DEGREES
CALCULATED T AXIS, ECG11: 38 DEGREES
CHLORIDE SERPL-SCNC: 111 MMOL/L (ref 98–107)
CO2 SERPL-SCNC: 24 MMOL/L (ref 21–32)
CREAT SERPL-MCNC: 0.83 MG/DL (ref 0.8–1.5)
DIAGNOSIS, 93000: NORMAL
DIFFERENTIAL METHOD BLD: NORMAL
EOSINOPHIL # BLD: 0.1 K/UL (ref 0–0.8)
EOSINOPHIL NFR BLD: 2 % (ref 0.5–7.8)
ERYTHROCYTE [DISTWIDTH] IN BLOOD BY AUTOMATED COUNT: 13.3 % (ref 11.9–14.6)
GLUCOSE BLD STRIP.AUTO-MCNC: 128 MG/DL (ref 65–100)
GLUCOSE BLD STRIP.AUTO-MCNC: 170 MG/DL (ref 65–100)
GLUCOSE BLD STRIP.AUTO-MCNC: 183 MG/DL (ref 65–100)
GLUCOSE BLD STRIP.AUTO-MCNC: 191 MG/DL (ref 65–100)
GLUCOSE SERPL-MCNC: 220 MG/DL (ref 65–100)
HCT VFR BLD AUTO: 47.5 % (ref 41.1–50.3)
HGB BLD-MCNC: 16.2 G/DL (ref 13.6–17.2)
IMM GRANULOCYTES # BLD AUTO: 0 K/UL (ref 0–0.5)
IMM GRANULOCYTES NFR BLD AUTO: 1 % (ref 0–5)
LYMPHOCYTES # BLD: 2.7 K/UL (ref 0.5–4.6)
LYMPHOCYTES NFR BLD: 35 % (ref 13–44)
MCH RBC QN AUTO: 30.6 PG (ref 26.1–32.9)
MCHC RBC AUTO-ENTMCNC: 34.1 G/DL (ref 31.4–35)
MCV RBC AUTO: 89.8 FL (ref 79.6–97.8)
MONOCYTES # BLD: 0.7 K/UL (ref 0.1–1.3)
MONOCYTES NFR BLD: 10 % (ref 4–12)
NEUTS SEG # BLD: 4 K/UL (ref 1.7–8.2)
NEUTS SEG NFR BLD: 52 % (ref 43–78)
NRBC # BLD: 0 K/UL (ref 0–0.2)
P-R INTERVAL, ECG05: 192 MS
PLATELET # BLD AUTO: 156 K/UL (ref 150–450)
PMV BLD AUTO: 10 FL (ref 9.4–12.3)
POTASSIUM SERPL-SCNC: 3.6 MMOL/L (ref 3.5–5.1)
Q-T INTERVAL, ECG07: 394 MS
QRS DURATION, ECG06: 102 MS
QTC CALCULATION (BEZET), ECG08: 476 MS
RBC # BLD AUTO: 5.29 M/UL (ref 4.23–5.6)
SODIUM SERPL-SCNC: 140 MMOL/L (ref 136–145)
VENTRICULAR RATE, ECG03: 88 BPM
WBC # BLD AUTO: 7.6 K/UL (ref 4.3–11.1)

## 2020-06-10 PROCEDURE — C8929 TTE W OR WO FOL WCON,DOPPLER: HCPCS

## 2020-06-10 PROCEDURE — 74011250637 HC RX REV CODE- 250/637: Performed by: HOSPITALIST

## 2020-06-10 PROCEDURE — C1769 GUIDE WIRE: HCPCS

## 2020-06-10 PROCEDURE — 74011250636 HC RX REV CODE- 250/636: Performed by: RADIOLOGY

## 2020-06-10 PROCEDURE — 99153 MOD SED SAME PHYS/QHP EA: CPT

## 2020-06-10 PROCEDURE — C1757 CATH, THROMBECTOMY/EMBOLECT: HCPCS

## 2020-06-10 PROCEDURE — 86580 TB INTRADERMAL TEST: CPT | Performed by: HOSPITALIST

## 2020-06-10 PROCEDURE — 36415 COLL VENOUS BLD VENIPUNCTURE: CPT

## 2020-06-10 PROCEDURE — 74011636320 HC RX REV CODE- 636/320: Performed by: HOSPITALIST

## 2020-06-10 PROCEDURE — 82962 GLUCOSE BLOOD TEST: CPT

## 2020-06-10 PROCEDURE — 74011000250 HC RX REV CODE- 250: Performed by: HOSPITALIST

## 2020-06-10 PROCEDURE — 85730 THROMBOPLASTIN TIME PARTIAL: CPT

## 2020-06-10 PROCEDURE — C1894 INTRO/SHEATH, NON-LASER: HCPCS

## 2020-06-10 PROCEDURE — 74011250636 HC RX REV CODE- 250/636: Performed by: HOSPITALIST

## 2020-06-10 PROCEDURE — 74011000302 HC RX REV CODE- 302: Performed by: HOSPITALIST

## 2020-06-10 PROCEDURE — 75820 VEIN X-RAY ARM/LEG: CPT

## 2020-06-10 PROCEDURE — 74011250637 HC RX REV CODE- 250/637: Performed by: FAMILY MEDICINE

## 2020-06-10 PROCEDURE — 36005 INJECTION EXT VENOGRAPHY: CPT

## 2020-06-10 PROCEDURE — 74011636637 HC RX REV CODE- 636/637: Performed by: FAMILY MEDICINE

## 2020-06-10 PROCEDURE — B51C1ZZ FLUOROSCOPY OF LEFT LOWER EXTREMITY VEINS USING LOW OSMOLAR CONTRAST: ICD-10-PCS | Performed by: RADIOLOGY

## 2020-06-10 PROCEDURE — 74011250636 HC RX REV CODE- 250/636: Performed by: FAMILY MEDICINE

## 2020-06-10 PROCEDURE — 65660000000 HC RM CCU STEPDOWN

## 2020-06-10 PROCEDURE — 77030021532 HC CATH ANGI DX IMPRS MRTM -B

## 2020-06-10 PROCEDURE — 80048 BASIC METABOLIC PNL TOTAL CA: CPT

## 2020-06-10 PROCEDURE — 06CN3ZZ EXTIRPATION OF MATTER FROM LEFT FEMORAL VEIN, PERCUTANEOUS APPROACH: ICD-10-PCS | Performed by: RADIOLOGY

## 2020-06-10 PROCEDURE — 85025 COMPLETE CBC W/AUTO DIFF WBC: CPT

## 2020-06-10 RX ORDER — HEPARIN SODIUM 5000 [USP'U]/100ML
18-36 INJECTION, SOLUTION INTRAVENOUS
Status: DISCONTINUED | OUTPATIENT
Start: 2020-06-10 | End: 2020-06-10

## 2020-06-10 RX ORDER — HEPARIN SODIUM 200 [USP'U]/100ML
2000 INJECTION, SOLUTION INTRAVENOUS AS NEEDED
Status: DISCONTINUED | OUTPATIENT
Start: 2020-06-10 | End: 2020-06-11 | Stop reason: HOSPADM

## 2020-06-10 RX ORDER — SODIUM CHLORIDE 9 MG/ML
75 INJECTION, SOLUTION INTRAVENOUS CONTINUOUS
Status: DISCONTINUED | OUTPATIENT
Start: 2020-06-10 | End: 2020-06-10

## 2020-06-10 RX ORDER — DEXTROSE 50 % IN WATER (D50W) INTRAVENOUS SYRINGE
25-50 AS NEEDED
Status: DISCONTINUED | OUTPATIENT
Start: 2020-06-10 | End: 2020-06-11 | Stop reason: HOSPADM

## 2020-06-10 RX ORDER — ADHESIVE BANDAGE
30 BANDAGE TOPICAL DAILY PRN
Status: DISCONTINUED | OUTPATIENT
Start: 2020-06-10 | End: 2020-06-11 | Stop reason: HOSPADM

## 2020-06-10 RX ORDER — SODIUM CHLORIDE 9 MG/ML
25 INJECTION, SOLUTION INTRAVENOUS ONCE
Status: COMPLETED | OUTPATIENT
Start: 2020-06-10 | End: 2020-06-10

## 2020-06-10 RX ORDER — SODIUM CHLORIDE 0.9 % (FLUSH) 0.9 %
5-40 SYRINGE (ML) INJECTION EVERY 8 HOURS
Status: DISCONTINUED | OUTPATIENT
Start: 2020-06-10 | End: 2020-06-11 | Stop reason: HOSPADM

## 2020-06-10 RX ORDER — NITROGLYCERIN 0.4 MG/1
0.4 TABLET SUBLINGUAL
Status: DISCONTINUED | OUTPATIENT
Start: 2020-06-10 | End: 2020-06-11 | Stop reason: HOSPADM

## 2020-06-10 RX ORDER — HEPARIN SODIUM 1000 [USP'U]/ML
5000 INJECTION, SOLUTION INTRAVENOUS; SUBCUTANEOUS ONCE
Status: DISCONTINUED | OUTPATIENT
Start: 2020-06-10 | End: 2020-06-10

## 2020-06-10 RX ORDER — PANTOPRAZOLE SODIUM 40 MG/1
40 TABLET, DELAYED RELEASE ORAL
Status: DISCONTINUED | OUTPATIENT
Start: 2020-06-10 | End: 2020-06-10 | Stop reason: SDUPTHER

## 2020-06-10 RX ORDER — DIPHENHYDRAMINE HYDROCHLORIDE 50 MG/ML
25-50 INJECTION, SOLUTION INTRAMUSCULAR; INTRAVENOUS ONCE
Status: COMPLETED | OUTPATIENT
Start: 2020-06-10 | End: 2020-06-10

## 2020-06-10 RX ORDER — ACETAMINOPHEN 325 MG/1
650 TABLET ORAL
Status: DISCONTINUED | OUTPATIENT
Start: 2020-06-10 | End: 2020-06-11 | Stop reason: HOSPADM

## 2020-06-10 RX ORDER — MIDAZOLAM HYDROCHLORIDE 1 MG/ML
.5-2 INJECTION, SOLUTION INTRAMUSCULAR; INTRAVENOUS
Status: DISCONTINUED | OUTPATIENT
Start: 2020-06-10 | End: 2020-06-10

## 2020-06-10 RX ORDER — LIDOCAINE HYDROCHLORIDE 20 MG/ML
2-20 INJECTION, SOLUTION INFILTRATION; PERINEURAL
Status: DISCONTINUED | OUTPATIENT
Start: 2020-06-10 | End: 2020-06-11 | Stop reason: HOSPADM

## 2020-06-10 RX ORDER — SODIUM CHLORIDE 0.9 % (FLUSH) 0.9 %
5-40 SYRINGE (ML) INJECTION AS NEEDED
Status: DISCONTINUED | OUTPATIENT
Start: 2020-06-10 | End: 2020-06-11 | Stop reason: HOSPADM

## 2020-06-10 RX ORDER — NALOXONE HYDROCHLORIDE 0.4 MG/ML
0.4 INJECTION, SOLUTION INTRAMUSCULAR; INTRAVENOUS; SUBCUTANEOUS AS NEEDED
Status: DISCONTINUED | OUTPATIENT
Start: 2020-06-10 | End: 2020-06-11 | Stop reason: HOSPADM

## 2020-06-10 RX ORDER — FENTANYL CITRATE 50 UG/ML
25-100 INJECTION, SOLUTION INTRAMUSCULAR; INTRAVENOUS
Status: DISCONTINUED | OUTPATIENT
Start: 2020-06-10 | End: 2020-06-10

## 2020-06-10 RX ORDER — MORPHINE SULFATE 2 MG/ML
1 INJECTION, SOLUTION INTRAMUSCULAR; INTRAVENOUS
Status: DISCONTINUED | OUTPATIENT
Start: 2020-06-10 | End: 2020-06-11 | Stop reason: HOSPADM

## 2020-06-10 RX ORDER — INSULIN LISPRO 100 [IU]/ML
INJECTION, SOLUTION INTRAVENOUS; SUBCUTANEOUS
Status: DISCONTINUED | OUTPATIENT
Start: 2020-06-10 | End: 2020-06-11 | Stop reason: HOSPADM

## 2020-06-10 RX ORDER — PANTOPRAZOLE SODIUM 40 MG/1
40 TABLET, DELAYED RELEASE ORAL
Status: DISCONTINUED | OUTPATIENT
Start: 2020-06-10 | End: 2020-06-11 | Stop reason: HOSPADM

## 2020-06-10 RX ORDER — ALBUTEROL SULFATE 0.83 MG/ML
2.5 SOLUTION RESPIRATORY (INHALATION)
Status: DISCONTINUED | OUTPATIENT
Start: 2020-06-10 | End: 2020-06-11 | Stop reason: HOSPADM

## 2020-06-10 RX ORDER — DEXTROSE 40 %
15 GEL (GRAM) ORAL AS NEEDED
Status: DISCONTINUED | OUTPATIENT
Start: 2020-06-10 | End: 2020-06-11 | Stop reason: HOSPADM

## 2020-06-10 RX ORDER — HYDROCODONE BITARTRATE AND ACETAMINOPHEN 5; 325 MG/1; MG/1
1 TABLET ORAL
Status: DISCONTINUED | OUTPATIENT
Start: 2020-06-10 | End: 2020-06-11 | Stop reason: HOSPADM

## 2020-06-10 RX ORDER — DIPHENHYDRAMINE HYDROCHLORIDE 50 MG/ML
12.5 INJECTION, SOLUTION INTRAMUSCULAR; INTRAVENOUS
Status: DISCONTINUED | OUTPATIENT
Start: 2020-06-10 | End: 2020-06-11 | Stop reason: HOSPADM

## 2020-06-10 RX ADMIN — LIDOCAINE HYDROCHLORIDE 10 ML: 20 INJECTION, SOLUTION INFILTRATION; PERINEURAL at 14:45

## 2020-06-10 RX ADMIN — HEPARIN SODIUM 18 UNITS/KG/HR: 5000 INJECTION, SOLUTION INTRAVENOUS at 14:10

## 2020-06-10 RX ADMIN — Medication 10 ML: at 06:38

## 2020-06-10 RX ADMIN — IOPAMIDOL 20 ML: 612 INJECTION, SOLUTION INTRAVENOUS at 14:46

## 2020-06-10 RX ADMIN — DIPHENHYDRAMINE HYDROCHLORIDE 50 MG: 50 INJECTION, SOLUTION INTRAMUSCULAR; INTRAVENOUS at 13:53

## 2020-06-10 RX ADMIN — HYDROCODONE BITARTRATE AND ACETAMINOPHEN 1 TABLET: 5; 325 TABLET ORAL at 11:29

## 2020-06-10 RX ADMIN — PANTOPRAZOLE SODIUM 40 MG: 40 TABLET, DELAYED RELEASE ORAL at 08:24

## 2020-06-10 RX ADMIN — FENTANYL CITRATE 25 MCG: 50 INJECTION, SOLUTION INTRAMUSCULAR; INTRAVENOUS at 14:19

## 2020-06-10 RX ADMIN — HYDROCODONE BITARTRATE AND ACETAMINOPHEN 1 TABLET: 5; 325 TABLET ORAL at 02:13

## 2020-06-10 RX ADMIN — Medication 10 ML: at 15:18

## 2020-06-10 RX ADMIN — HEPARIN SODIUM 2000 UNITS: 200 INJECTION, SOLUTION INTRAVENOUS at 14:10

## 2020-06-10 RX ADMIN — FENTANYL CITRATE 50 MCG: 50 INJECTION, SOLUTION INTRAMUSCULAR; INTRAVENOUS at 13:54

## 2020-06-10 RX ADMIN — PERFLUTREN 1 ML: 6.52 INJECTION, SUSPENSION INTRAVENOUS at 08:00

## 2020-06-10 RX ADMIN — FENTANYL CITRATE 25 MCG: 50 INJECTION, SOLUTION INTRAMUSCULAR; INTRAVENOUS at 14:27

## 2020-06-10 RX ADMIN — HYDROCODONE BITARTRATE AND ACETAMINOPHEN 1 TABLET: 5; 325 TABLET ORAL at 21:17

## 2020-06-10 RX ADMIN — MIDAZOLAM 0.5 MG: 1 INJECTION INTRAMUSCULAR; INTRAVENOUS at 14:27

## 2020-06-10 RX ADMIN — TUBERCULIN PURIFIED PROTEIN DERIVATIVE 5 UNITS: 5 INJECTION, SOLUTION INTRADERMAL at 22:47

## 2020-06-10 RX ADMIN — HYDROCODONE BITARTRATE AND ACETAMINOPHEN 1 TABLET: 5; 325 TABLET ORAL at 16:22

## 2020-06-10 RX ADMIN — INSULIN LISPRO 2 UNITS: 100 INJECTION, SOLUTION INTRAVENOUS; SUBCUTANEOUS at 08:29

## 2020-06-10 RX ADMIN — MIDAZOLAM 1 MG: 1 INJECTION INTRAMUSCULAR; INTRAVENOUS at 14:34

## 2020-06-10 RX ADMIN — MIDAZOLAM 0.5 MG: 1 INJECTION INTRAMUSCULAR; INTRAVENOUS at 14:19

## 2020-06-10 RX ADMIN — FENTANYL CITRATE 50 MCG: 50 INJECTION, SOLUTION INTRAMUSCULAR; INTRAVENOUS at 14:39

## 2020-06-10 RX ADMIN — MIDAZOLAM 1 MG: 1 INJECTION INTRAMUSCULAR; INTRAVENOUS at 13:54

## 2020-06-10 RX ADMIN — HEPARIN SODIUM 18 UNITS/KG/HR: 5000 INJECTION, SOLUTION INTRAVENOUS at 12:12

## 2020-06-10 RX ADMIN — Medication 10 ML: at 21:19

## 2020-06-10 RX ADMIN — SODIUM CHLORIDE 75 ML/HR: 9 INJECTION, SOLUTION INTRAVENOUS at 02:08

## 2020-06-10 RX ADMIN — APIXABAN 10 MG: 5 TABLET, FILM COATED ORAL at 22:47

## 2020-06-10 RX ADMIN — SODIUM CHLORIDE 25 ML/HR: 900 INJECTION, SOLUTION INTRAVENOUS at 13:50

## 2020-06-10 RX ADMIN — INSULIN LISPRO 2 UNITS: 100 INJECTION, SOLUTION INTRAVENOUS; SUBCUTANEOUS at 22:59

## 2020-06-10 RX ADMIN — INSULIN LISPRO 2 UNITS: 100 INJECTION, SOLUTION INTRAVENOUS; SUBCUTANEOUS at 11:34

## 2020-06-10 NOTE — DISCHARGE SUMMARY
Hospitalist Discharge Summary     Admit Date:  2020  7:39 PM   Name:  Yenifer Larkin [facility]   Age:  77 y.o.  :  1955   MRN:  [id number]   PCP:  Traci Giordano MD  Treatment Team: Attending Provider: Antionette Mcgrath MD    Problem List for this Hospitalization:  Hospital Problems as of 2020 Date Reviewed: 2019          Codes Class Noted - Resolved POA    DM type 2 (diabetes mellitus, type 2) ([facility] ICD-10-CM: E11.9  ICD-9-CM: 250.00  2020 - Present Unknown        * (Principal) DVT of lower limb, acute ([facility] ICD-10-CM: I82.409  ICD-9-CM: 453.40  2020 - Present Unknown        Bilateral pulmonary embolism ([facility] ICD-10-CM: I26.99  ICD-9-CM: 415.19  2020 - Present Unknown        CAD (coronary artery disease) ICD-10-CM: I25.10  ICD-9-CM: 414.00  2018 - Present Unknown        Gastroesophageal reflux disease without esophagitis ICD-10-CM: K21.9  ICD-9-CM: 530.81  2015 - Present Yes        Mixed hyperlipidemia ICD-10-CM: E78.2  ICD-9-CM: 272.2  3/11/2015 - Present Yes                Admission HPI from 2020:    40-year-old  male patient with a known history of coronary disease, last stent about 2 years ago, Plavix discontinued by cardiologist since a year ago, presently only on aspirin. Known history of diabetes mellitus type 2 on metformin and Jardiance, acid reflux on Protonix, hypertension,? COPD and hyperlipidemia.        According to wife patient only takes Protonix, metformin, Jardiance, aspirin. He does not take these medications daily.     Patient says since past 2 weeks he started noticing shortness of breath on walking. When I asked him if he could give an example, approximately how how far if he walks he would get short of breath, patient says from his room to the parking lot.   Did not complain of any chest pain or headache or any dizziness with shortness of breath.     This morning he woke up with left leg pain, mostly over the popliteal region, noticed to have left leg swelling. Pain on walking. He was seen by cardiology, Dr. Ollie Motley in his office, was sent to [place] radiology department for an outpatient left lower extremity venous Doppler. Patient was found to have-Positive deep venous thrombosis in the left femoral, popliteal,posterior tibial and peroneal  Veins. Hospital service was consulted to admit the patient.     Apart from pain mostly over the left popliteal region and left leg swelling since this morning, dyspnea on walking going on for past 2 weeks, of the 10 review of systems apart from the one mentioned above patient other review of systems were negative noncontributory.     Labs pending.     Patient will be admitted for extensive DVT of left lower extremity. Hospital Course:  Pt was started on heparin drip. Pts HbA1c 11.8. started pt on Sliding scale insulin. Ct chest PE protocol was ordered-  Acute bilateral pulmonary emboli extending into the right lower lobar, left  lower lobar and left upper lobar pulmonary arteries. Spoke to BRENT Melvin would need  procedure for Bilateral PE and Left lower extremity DVT. Pt was explained about the results and the procedure needed, was ok for transfer to [facility] for the procedure. Pt is does have a ICU bed at [facility]. Spoke to [employer] "[employer] , Hospitalist on call regarding transfer. No questions unanswered. Pt is clinically stable for transfer    Follow up instructions below. Plan was discussed with patient. All questions answered. Patient was stable at time of discharge and was instructed to call or return if there are any concerns or recurrence of symptoms. Diagnostic Imaging/Tests:   Ct Chest W Cont    Result Date: 6/9/2020  CT OF THE CHEST WITH CONTRAST, PULMONARY EMBOLUS PROTOCOL.  CLINICAL INDICATION: Shortness of breath, positive deep venous thrombosis PROCEDURE: Serial thin section axial images are obtained from the thoracic inlet through the upper abdomen following the administration of intravenous contrast per a dedicated, institutional pulmonary embolus protocol. Coronal MIP reformatted images are generated. Radiation dose reduction techniques were used for this study. Our CT scanners use one or all of the following: Automated exposure control, adjusted of the mA and/or kV according to patient size, iterative reconstruction COMPARISON: No prior FINDINGS: The aorta is unremarkable. There is adequate opacification of the central pulmonary arterial tree. Bilateral central intraluminal filling defects are appreciated in the lower lobar pulmonary arteries extending into multiple segmental pulmonary arteries. Clot burden is considered moderate. Thrombus is also appreciated into a left upper lobar pulmonary artery. No mediastinal or axillary adenopathy. There is no pneumothorax. Emphysematous changes are present. Dependent atelectasis is noted otherwise the lung parenchyma is clear. Limited evaluation of the upper abdomen is unremarkable. No aggressive osseous lesions identified. IMPRESSION: 1. Acute bilateral pulmonary emboli extending into the right lower lobar, left lower lobar and left upper lobar pulmonary arteries. [facility] The critical results contained in this report were communicated to the territory of the floor nurse communicated that she would contact the covering physician Dr. Augusto Tam immediately by Dr. Luis Blanco on 6/9/2020 at 10:45 PM. Critical results were communicated as outlined in Section II.C.2.a.i of the ACR Practice Guideline for Communication of Diagnostic Imaging Findings. Duplex Lower Ext Venous Left    Result Date: 6/9/2020  Left lower extremity venous duplex exam. CLINICAL INDICATION: Moderate left leg swelling and pain for one day. PROCEDURE: Real-time grayscale, color, and spectral Doppler analysis of the left lower extremity deep venous system from the common femoral vein through the posterior tibial and peroneal veins. COMPARISON: No prior. FINDINGS: Occlusive thrombus is noted in the femoral, popliteal, posterior tibial and peroneal veins in keeping with deep venous thrombosis. The common femoral and profunda veins are patent. IMPRESSION: Positive deep venous thrombosis in the left femoral, popliteal, posterior tibial and peroneal veins. The important findings were called to the referring provider Dr. Giuseppe Evangelista by the performing sonographer on 6/9/2020 at roughly 7:00 PM.      Echocardiogram results:  No results found for this visit on 06/09/20.       All Micro Results     None          Labs: Results:       BMP, Mg, Phos Recent Labs     06/09/20 2019      K 3.9      CO2 25   AGAP 8   BUN 13   CREA 0.97   CA 8.5   *      CBC Recent Labs     06/09/20 2019   WBC 7.5   RBC 5.74*   HGB 17.3*   HCT 51.4*      GRANS 62   LYMPH 27   EOS 1   MONOS 9   BASOS 1   IG 1   ANEU 4.6   ABL 2.0   RHONDA 0.1   ABM 0.7   ABB 0.1   AIG 0.1      LFT Recent Labs     06/09/20 2019   ALT 25   AP 88   TP 6.9   ALB 3.8   GLOB 3.1   AGRAT 1.2      Cardiac Testing Lab Results   Component Value Date/Time    BNP 24 04/12/2018 09:53 AM      Coagulation Tests Lab Results   Component Value Date/Time    Prothrombin time 14.0 06/09/2020 08:19 PM    Prothrombin time 13.0 02/18/2019 06:44 AM    INR 1.0 06/09/2020 08:19 PM    INR 1.0 02/18/2019 06:44 AM    aPTT 25.0 06/09/2020 08:19 PM      A1c Lab Results   Component Value Date/Time    Hemoglobin A1c 11.8 06/09/2020 08:19 PM    Hemoglobin A1c 8.9 (H) 06/11/2019 09:51 AM    Hemoglobin A1c 11.9 (H) 03/08/2019 11:52 AM      Lipid Panel Lab Results   Component Value Date/Time    Cholesterol, total 104 02/13/2019 09:31 AM    HDL Cholesterol 21 (L) 02/13/2019 09:31 AM    LDL, calculated 30.2 02/13/2019 09:31 AM    VLDL, calculated 52.8 (H) 02/13/2019 09:31 AM    Triglyceride 264 (H) 02/13/2019 09:31 AM    CHOL/HDL Ratio 5.0 02/13/2019 09:31 AM      Thyroid Panel Lab Results   Component Value Date/Time    TSH 0.992 02/13/2019 09:31 AM    TSH 0.478 04/20/2018 10:27 AM        Most Recent UA Lab Results   Component Value Date/Time    Color Yellow 12/05/2017 08:50 AM    Appearance Clear 12/05/2017 08:50 AM    pH (UA) 7.0 12/05/2017 08:50 AM    Ketone Negative 12/05/2017 08:50 AM    Bilirubin Negative 12/05/2017 08:50 AM    Blood Negative 12/05/2017 08:50 AM    Nitrites Negative 12/05/2017 08:50 AM    Leukocyte Esterase Negative 12/05/2017 08:50 AM        Allergies   Allergen Reactions    Brilinta [Ticagrelor] Other (comments)     Chest pain     Immunization History   Administered Date(s) Administered    TB Skin Test (PPD) Intradermal 06/09/2020    Td 02/12/2004    Tdap 01/13/2015       All Labs from Last 24 Hrs:  Recent Results (from the past 24 hour(s))   CBC WITH AUTOMATED DIFF    Collection Time: 06/09/20  8:19 PM   Result Value Ref Range    WBC 7.5 4.3 - 11.1 K/uL    RBC 5.74 (H) 4.23 - 5.6 M/uL    HGB 17.3 (H) 13.6 - 17.2 g/dL    HCT 51.4 (H) 41.1 - 50.3 %    MCV 89.5 79.6 - 97.8 FL    MCH 30.1 26.1 - 32.9 PG    MCHC 33.7 31.4 - 35.0 g/dL    RDW 13.5 11.9 - 14.6 %    PLATELET 122 690 - 751 K/uL    MPV 9.5 9.4 - 12.3 FL    ABSOLUTE NRBC 0.00 0.0 - 0.2 K/uL    DF AUTOMATED      NEUTROPHILS 62 43 - 78 %    LYMPHOCYTES 27 13 - 44 %    MONOCYTES 9 4.0 - 12.0 %    EOSINOPHILS 1 0.5 - 7.8 %    BASOPHILS 1 0.0 - 2.0 %    IMMATURE GRANULOCYTES 1 0.0 - 5.0 %    ABS. NEUTROPHILS 4.6 1.7 - 8.2 K/UL    ABS. LYMPHOCYTES 2.0 0.5 - 4.6 K/UL    ABS. MONOCYTES 0.7 0.1 - 1.3 K/UL    ABS. EOSINOPHILS 0.1 0.0 - 0.8 K/UL    ABS. BASOPHILS 0.1 0.0 - 0.2 K/UL    ABS. IMM.  GRANS. 0.1 0.0 - 0.5 K/UL   METABOLIC PANEL, COMPREHENSIVE    Collection Time: 06/09/20  8:19 PM   Result Value Ref Range    Sodium 138 136 - 145 mmol/L    Potassium 3.9 3.5 - 5.1 mmol/L    Chloride 105 98 - 107 mmol/L    CO2 25 21 - 32 mmol/L    Anion gap 8 7 - 16 mmol/L    Glucose 225 (H) 65 - 100 mg/dL    BUN 13 8 - 23 MG/DL    Creatinine 0.97 0.8 - 1.5 MG/DL    GFR est AA >60 >60 ml/min/1.73m2    GFR est non-AA >60 >60 ml/min/1.73m2    Calcium 8.5 8.3 - 10.4 MG/DL    Bilirubin, total 0.9 0.2 - 1.1 MG/DL    ALT (SGPT) 25 12 - 65 U/L    AST (SGOT) 11 (L) 15 - 37 U/L    Alk. phosphatase 88 50 - 136 U/L    Protein, total 6.9 6.3 - 8.2 g/dL    Albumin 3.8 3.2 - 4.6 g/dL    Globulin 3.1 2.3 - 3.5 g/dL    A-G Ratio 1.2 1.2 - 3.5     PROTHROMBIN TIME + INR    Collection Time: 06/09/20  8:19 PM   Result Value Ref Range    Prothrombin time 14.0 12.0 - 14.7 sec    INR 1.0     PTT    Collection Time: 06/09/20  8:19 PM   Result Value Ref Range    aPTT 25.0 24.3 - 35.4 SEC   HEMOGLOBIN A1C WITH EAG    Collection Time: 06/09/20  8:19 PM   Result Value Ref Range    Hemoglobin A1c 11.8 %    Est. average glucose 292 mg/dL   GLUCOSE, POC    Collection Time: 06/09/20  9:12 PM   Result Value Ref Range    Glucose (POC) 234 (H) 65 - 100 mg/dL       Discharge Exam:  Patient Vitals for the past 24 hrs:   Temp Pulse Resp BP SpO2   06/09/20 2245 98.2 °F (36.8 °C) 94 16 111/78 92 %   06/09/20 2035 98.1 °F (36.7 °C) 87 22 125/81 97 %     Oxygen Therapy  O2 Sat (%): 92 % (06/09/20 2245)  No intake or output data in the 24 hours ending 06/09/20 5403    General:    Well nourished. Alert. No distress. Eyes:   Normal sclera. Extraocular movements intact. ENT:  Normocephalic, atraumatic. Moist mucous membranes  CV:   Regular rate and rhythm. No murmur, rub, or gallop. Lungs:  Clear to auscultation bilaterally. No wheezing, rhonchi, or rales. Abdomen: Soft, nontender, nondistended. Bowel sounds normal.   Extremities: Warm and dry. No cyanosis or edema. Neurologic: CN II-XII grossly intact. Sensation intact. Skin:     No rashes or jaundice. Psych:  Normal mood and affect. Discharge Info:   Current Discharge Medication List            [facility]   Activity:bedrest  Diet: DIET CARDIAC Regular    No orders of the defined types were placed in this encounter.         Follow-up Information     Follow up With Specialties Details Why [address], MD Internal Medicine   [address] [facility]  [phone]            Time spent in patient discharge planning and coordination 35 minutes.     Signed:  Omaira Carrasco MD

## 2020-06-10 NOTE — PROGRESS NOTES
TRANSFER - IN REPORT:    Verbal report received from Damian Ramirez RN(name) on ELVIRA Delvalle  being received from ICU(unit) for routine progression of care      Report consisted of patients Situation, Background, Assessment and   Recommendations(SBAR). Information from the following report(s) SBAR, MAR and Recent Results was reviewed with the receiving nurse. Opportunity for questions and clarification was provided. Assessment completed upon patients arrival to unit and care assumed.

## 2020-06-10 NOTE — PROGRESS NOTES
Patient admitted to unit from [place]    Patient placed on monitor   Admit strip printed   ICU consent form signed   Admitting called   Hospitalist made aware of pt transfer    CHG bath preformed   New gown placed   Dual skin assessment with JUDY Gates  Skin C/D/I  LLE swelling noted, 2+ pitting edema, pedal and femoral pulses palpated.  Leg is warm and sensation present   No other skin abnormalities noted   IV's assessed   Allevyn placed on sacrum     Heparin gtt continued on admit     Will continue to monitor patient condition

## 2020-06-10 NOTE — PROCEDURES
Department of Interventional Radiology  [phone]        Interventional Radiology Brief Procedure Note    Patient: Angélica Aly MRN: [id number]  SSN: [id number]    YOB: 1955  Age: 77 y.o. Sex: male      Date of Procedure: 6/10/2020    Pre-Procedure Diagnosis: Acute LLE DVT. Acute PE. Post-Procedure Diagnosis: SAME    Procedure(s): LLE venography with mechanical thrombectomy    Brief Description of Procedure: LLE venography reveals occlusive thrombus throughout the left FV. Successful mechanical thrombectomy with removal of a majority of the thrombus. Left popliteal vein approach. May Thurner lesion is suspected in the left common iliac vein. Will plan for follow up venogram in 6 weeks in IR. Performed By: Yang Veliz MD     Assistants: None    Anesthesia:Moderate Sedation    Estimated Blood Loss: Less than 10ml    Specimens:  None    Implants:  None    Findings: As above    Complications: None    Recommendations: Transition to eliquis. Ambulate (once two hours post-op).       Signed By: Yang Veliz MD     Kellen 10, 2020

## 2020-06-10 NOTE — PROGRESS NOTES
TRANSFER - OUT REPORT:    Verbal report given to Kirti Landis RN(name) on ELVIRA Delvalle  being transferred to 7th floor(unit) for routine progression of care       Report consisted of patients Situation, Background, Assessment and   Recommendations(SBAR). Information from the following report(s) SBAR, Procedure Summary, Intake/Output, MAR, Med Rec Status and Cardiac Rhythm NSR was reviewed with the receiving nurse. Lines:   Peripheral IV 06/09/20 Left Forearm (Active)   Site Assessment Clean, dry, & intact 6/10/2020  7:00 AM   Phlebitis Assessment 0 6/10/2020  7:00 AM   Infiltration Assessment 0 6/10/2020  7:00 AM   Dressing Status Clean, dry, & intact 6/10/2020  7:00 AM   Dressing Type Tape;Transparent 6/10/2020  7:00 AM   Hub Color/Line Status Patent;Blue;Flushed 6/10/2020  7:00 AM   Alcohol Cap Used No 6/10/2020  7:00 AM       Peripheral IV 06/10/20 Right Antecubital (Active)   Site Assessment Clean, dry, & intact 6/10/2020  7:00 AM   Phlebitis Assessment 0 6/10/2020  7:00 AM   Infiltration Assessment 0 6/10/2020  7:00 AM   Dressing Status Clean, dry, & intact 6/10/2020  7:00 AM   Dressing Type Tape;Transparent 6/10/2020  7:00 AM   Hub Color/Line Status Pink;Patent; Flushed 6/10/2020  7:00 AM   Alcohol Cap Used No 6/10/2020  7:00 AM        Opportunity for questions and clarification was provided.       Patient transported with:   Monitor  Registered Nurse

## 2020-06-10 NOTE — PROGRESS NOTES
TRANSFER - IN REPORT:    Verbal report received from [address], RN(name) on Jorge Delvalle  being received from [address] (unit) for ordered procedure      Report consisted of patients Situation, Background, Assessment and   Recommendations(SBAR). Information from the following report(s) SBAR, Kardex, ED Summary, Intake/Output, MAR, Recent Results, Med Rec Status, Cardiac Rhythm NSR, Alarm Parameters  and Quality Measures was reviewed with the receiving nurse. Opportunity for questions and clarification was provided. Assessment completed upon patients arrival to unit and care assumed.

## 2020-06-10 NOTE — PROGRESS NOTES
TB TEST GIVEN AT [facility] 6/9/2020    PHARM NOTIFIED     RECHECK TIMES RESCHEDULED

## 2020-06-10 NOTE — PROGRESS NOTES
TRANSFER - OUT REPORT:    Verbal report given to Pastor(name) on Sloane Delvalle  being transferred to ICU(unit) for routine progression of care       Report consisted of patients Situation, Background, Assessment and   Recommendations(SBAR). Information from the following report(s) SBAR, Kardex and Recent Results was reviewed with the receiving nurse. Lines:   Peripheral IV 06/09/20 Left Forearm (Active)   Site Assessment Clean, dry, & intact 6/9/2020  8:59 PM   Phlebitis Assessment 0 6/9/2020  8:59 PM   Infiltration Assessment 0 6/9/2020  8:59 PM   Dressing Status Clean, dry, & intact; New 6/9/2020  8:59 PM   Dressing Type Transparent;Tape 6/9/2020  8:59 PM   Hub Color/Line Status Blue 6/9/2020  8:59 PM   Alcohol Cap Used No 6/9/2020  8:59 PM        Opportunity for questions and clarification was provided.       Patient transported with:   Monitor

## 2020-06-10 NOTE — PROGRESS NOTES
TRANSFER - OUT REPORT:    Verbal report given to Abdelrahman Sprague on Keri Delvalle  being transferred to ICU(unit) for routine progression of care       Report consisted of patients Situation, Background, Assessment and   Recommendations(SBAR). Information from the following report(s) SBAR, Procedure Summary and MAR was reviewed with the receiving nurse. Opportunity for questions and clarification was provided. Conscious Sedation:   150 Mcg of Fentanyl administered  3 Mg of Versed administered    Pt tolerated procedure well. 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    Other and unspecified hyperlipidemia     Primary localized osteoarthrosis, unspecified site      Peripheral IV 06/09/20 Left Forearm (Active)   Site Assessment Clean, dry, & intact 6/10/2020  7:00 AM   Phlebitis Assessment 0 6/10/2020  7:00 AM   Infiltration Assessment 0 6/10/2020  7:00 AM   Dressing Status Clean, dry, & intact 6/10/2020  7:00 AM   Dressing Type Tape;Transparent 6/10/2020  7:00 AM   Hub Color/Line Status Patent;Blue;Flushed 6/10/2020  7:00 AM   Alcohol Cap Used No 6/10/2020  7:00 AM       Peripheral IV 06/10/20 Right Antecubital (Active)   Site Assessment Clean, dry, & intact 6/10/2020  7:00 AM   Phlebitis Assessment 0 6/10/2020  7:00 AM   Infiltration Assessment 0 6/10/2020  7:00 AM   Dressing Status Clean, dry, & intact 6/10/2020  7:00 AM   Dressing Type Tape;Transparent 6/10/2020  7:00 AM   Hub Color/Line Status Pink;Patent; Flushed 6/10/2020  7:00 AM   Alcohol Cap Used No 6/10/2020 7:00 AM

## 2020-06-10 NOTE — INTERDISCIPLINARY ROUNDS
Interdisciplinary team rounds were held 6/10/2020 with the following team members:Care Management, Nursing, Nurse Practitioner, Nutrition, Palliative Care, Pastoral Care, Pharmacy, Physical Therapy, Physician, Respiratory Therapy and Clinical Coordinator and the patient. Plan of care discussed. See clinical pathway and/or care plan for interventions and desired outcomes.

## 2020-06-10 NOTE — H&P
Hospitalist H&P Note     Admit Date:  No admission date for patient encounter. [facility]   Age:  77 y.o.  :  1955   MRN:  [id number]   PCP:  Merlin Kirks, MD  Treatment Team: Attending Provider: Kevin Solorio MD    HPI:   Admission HPI from 2020:   57-year-old  male patient with a known history of coronary disease, last stent about 2 years ago, Plavix discontinued by cardiologist since a year ago, presently only on aspirin. Known history of diabetes mellitus type 2 on metformin and Jardiance, acid reflux on Protonix, hypertension,? COPD and hyperlipidemia. According to wife patient only takes Protonix, metformin, Jardiance, aspirin. He does not take these medications daily. Patient says since past 2 weeks he started noticing shortness of breath on walking. When I asked him if he could give an example, approximately how how far if he walks he would get short of breath, patient says from his room to the parking lot. Did not complain of any chest pain or headache or any dizziness with shortness of breath. This morning he woke up with left leg pain, mostly over the popliteal region, noticed to have left leg swelling. Pain on walking. He was seen by cardiology, Dr. Juan Sanchez in his office, was sent to [place] radiology department for an outpatient left lower extremity venous Doppler. Patient was found to have-Positive deep venous thrombosis in the left femoral, popliteal,posterior tibial and peroneal   Veins. Hospital service was consulted to admit the patient. Apart from pain mostly over the left popliteal region and left leg swelling since this morning, dyspnea on walking going on for past 2 weeks, of the 10 review of systems apart from the one mentioned above patient other review of systems were negative noncontributory. Labs pending. Patient will be admitted for extensive DVT of left lower extremity.    Hospital Course:   Pt was started on heparin drip.   Pts HbA1c 11.8. started pt on Sliding scale insulin. Ct chest PE protocol was ordered-   Acute bilateral pulmonary emboli extending into the right lower lobar, left   lower lobar and left upper lobar pulmonary arteries. Spoke to BRENT Poon would need procedure for Bilateral PE and Left lower extremity DVT. Pt was explained about the results and the procedure needed, was ok for transfer to [facility] for the procedure. Pt is does have a ICU bed at [facility]. Spoke to [facility] , Hospitalist on call regarding transfer. No questions unanswered. Pt is clinically stable for transfer      10 systems reviewed and negative except as noted in HPI. 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 LVEDP=21. LM:nl. LAD:patent stents. DX 1&Dx 2 are patent. LCX:patent stents. RCA:20-30% mid stenosis. Slow filling c/w endothelial dysfunction described.     HX HERNIA REPAIR      HX UROLOGICAL      kidney stone removal      Allergies   Allergen Reactions    Brilinta [Ticagrelor] Other (comments)     Chest pain      Social History     Tobacco Use    Smoking status: Former Smoker     Packs/day: 0.50     Years: 30.00     Pack years: 15.00     Types: Cigarettes     Last attempt to quit:      Years since quittin.4    Smokeless tobacco: Former User   Substance Use Topics    Alcohol use: No      Family History   Problem Relation Age of Onset  Cancer Mother         Liver Cancer      Immunization History   Administered Date(s) Administered    TB Skin Test (PPD) Intradermal 06/09/2020    Td 02/12/2004    Tdap 01/13/2015     PTA Medications:  Cannot display prior to admission medications because the patient has not been admitted in this contact. Objective:   No data found. No intake or output data in the 24 hours ending 06/09/20 8265    Physical Exam:  General:    Well nourished. Alert. Eyes:   Normal sclera. Extraocular movements intact. ENT:  Normocephalic, atraumatic. Moist mucous membranes  CV:   RRR. No murmur, rub, or gallop. Lungs:  CTAB. No wheezing, rhonchi, or rales. Abdomen: Soft, nontender, nondistended. Bowel sounds normal.   Extremities: Warm and dry. No cyanosis or edema. Neurologic: CN II-XII grossly intact. Sensation intact. Skin:     No rashes or jaundice. Psych:  Normal mood and affect. I reviewed the labs, imaging, EKGs, telemetry, and other studies done this admission. Data Review:   Recent Results (from the past 24 hour(s))   CBC WITH AUTOMATED DIFF    Collection Time: 06/09/20  8:19 PM   Result Value Ref Range    WBC 7.5 4.3 - 11.1 K/uL    RBC 5.74 (H) 4.23 - 5.6 M/uL    HGB 17.3 (H) 13.6 - 17.2 g/dL    HCT 51.4 (H) 41.1 - 50.3 %    MCV 89.5 79.6 - 97.8 FL    MCH 30.1 26.1 - 32.9 PG    MCHC 33.7 31.4 - 35.0 g/dL    RDW 13.5 11.9 - 14.6 %    PLATELET 856 591 - 652 K/uL    MPV 9.5 9.4 - 12.3 FL    ABSOLUTE NRBC 0.00 0.0 - 0.2 K/uL    DF AUTOMATED      NEUTROPHILS 62 43 - 78 %    LYMPHOCYTES 27 13 - 44 %    MONOCYTES 9 4.0 - 12.0 %    EOSINOPHILS 1 0.5 - 7.8 %    BASOPHILS 1 0.0 - 2.0 %    IMMATURE GRANULOCYTES 1 0.0 - 5.0 %    ABS. NEUTROPHILS 4.6 1.7 - 8.2 K/UL    ABS. LYMPHOCYTES 2.0 0.5 - 4.6 K/UL    ABS. MONOCYTES 0.7 0.1 - 1.3 K/UL    ABS. EOSINOPHILS 0.1 0.0 - 0.8 K/UL    ABS. BASOPHILS 0.1 0.0 - 0.2 K/UL    ABS. IMM.  GRANS. 0.1 0.0 - 0.5 K/UL   METABOLIC PANEL, COMPREHENSIVE    Collection Time: 06/09/20  8:19 PM   Result Value Ref Range    Sodium 138 136 - 145 mmol/L    Potassium 3.9 3.5 - 5.1 mmol/L    Chloride 105 98 - 107 mmol/L    CO2 25 21 - 32 mmol/L    Anion gap 8 7 - 16 mmol/L    Glucose 225 (H) 65 - 100 mg/dL    BUN 13 8 - 23 MG/DL    Creatinine 0.97 0.8 - 1.5 MG/DL    GFR est AA >60 >60 ml/min/1.73m2    GFR est non-AA >60 >60 ml/min/1.73m2    Calcium 8.5 8.3 - 10.4 MG/DL    Bilirubin, total 0.9 0.2 - 1.1 MG/DL    ALT (SGPT) 25 12 - 65 U/L    AST (SGOT) 11 (L) 15 - 37 U/L    Alk. phosphatase 88 50 - 136 U/L    Protein, total 6.9 6.3 - 8.2 g/dL    Albumin 3.8 3.2 - 4.6 g/dL    Globulin 3.1 2.3 - 3.5 g/dL    A-G Ratio 1.2 1.2 - 3.5     PROTHROMBIN TIME + INR    Collection Time: 06/09/20  8:19 PM   Result Value Ref Range    Prothrombin time 14.0 12.0 - 14.7 sec    INR 1.0     PTT    Collection Time: 06/09/20  8:19 PM   Result Value Ref Range    aPTT 25.0 24.3 - 35.4 SEC   HEMOGLOBIN A1C WITH EAG    Collection Time: 06/09/20  8:19 PM   Result Value Ref Range    Hemoglobin A1c 11.8 %    Est. average glucose 292 mg/dL   GLUCOSE, POC    Collection Time: 06/09/20  9:12 PM   Result Value Ref Range    Glucose (POC) 234 (H) 65 - 100 mg/dL       All Micro Results     None          Other Studies:  Ct Chest W Cont    Result Date: 6/9/2020  CT OF THE CHEST WITH CONTRAST, PULMONARY EMBOLUS PROTOCOL. CLINICAL INDICATION: Shortness of breath, positive deep venous thrombosis PROCEDURE: Serial thin section axial images are obtained from the thoracic inlet through the upper abdomen following the administration of intravenous contrast per a dedicated, institutional pulmonary embolus protocol. Coronal MIP reformatted images are generated. Radiation dose reduction techniques were used for this study. Our CT scanners use one or all of the following: Automated exposure control, adjusted of the mA and/or kV according to patient size, iterative reconstruction COMPARISON: No prior FINDINGS: The aorta is unremarkable. There is adequate opacification of the central pulmonary arterial tree. Bilateral central intraluminal filling defects are appreciated in the lower lobar pulmonary arteries extending into multiple segmental pulmonary arteries. Clot burden is considered moderate. Thrombus is also appreciated into a left upper lobar pulmonary artery. No mediastinal or axillary adenopathy. There is no pneumothorax. Emphysematous changes are present. Dependent atelectasis is noted otherwise the lung parenchyma is clear. Limited evaluation of the upper abdomen is unremarkable. No aggressive osseous lesions identified. IMPRESSION: 1. Acute bilateral pulmonary emboli extending into the right lower lobar, left lower lobar and left upper lobar pulmonary arteries. [facility] The critical results contained in this report were communicated to the territory of the floor nurse communicated that she would contact the covering physician Dr. Demarco Arteaga immediately by Dr. Cris Echols on 6/9/2020 at 10:45 PM. Critical results were communicated as outlined in Section II.C.2.a.i of the ACR Practice Guideline for Communication of Diagnostic Imaging Findings. Duplex Lower Ext Venous Left    Result Date: 6/9/2020  Left lower extremity venous duplex exam. CLINICAL INDICATION: Moderate left leg swelling and pain for one day. PROCEDURE: Real-time grayscale, color, and spectral Doppler analysis of the left lower extremity deep venous system from the common femoral vein through the posterior tibial and peroneal veins. COMPARISON: No prior. FINDINGS: Occlusive thrombus is noted in the femoral, popliteal, posterior tibial and peroneal veins in keeping with deep venous thrombosis. The common femoral and profunda veins are patent. IMPRESSION: Positive deep venous thrombosis in the left femoral, popliteal, posterior tibial and peroneal veins.  The important findings were called to the referring provider Dr. Any Platt by the performing sonographer on 6/9/2020 at roughly 7:00 PM.      Assessment and Plan:     Hospital Problems as of 6/9/2020 Date Reviewed: 6/11/2019          Codes Class Noted - Resolved POA    DM type 2 (diabetes mellitus, type 2) ([facility] ICD-10-CM: E11.9  ICD-9-CM: 250.00  6/9/2020 - Present Yes        DVT of lower limb, acute ([facility] ICD-10-CM: I82.409  ICD-9-CM: 453.40  6/9/2020 - Present Yes        Bilateral pulmonary embolism ([facility] ICD-10-CM: I26.99  ICD-9-CM: 415.19  6/9/2020 - Present Yes        CAD (coronary artery disease) ICD-10-CM: I25.10  ICD-9-CM: 414.00  4/24/2018 - Present Yes        Gastroesophageal reflux disease without esophagitis ICD-10-CM: K21.9  ICD-9-CM: 530.81  7/14/2015 - Present Yes              PLAN:  Bilateral PE and extensive left lower ext dvt- on heparin drip. For procedure by IR in am.  Dm type 2-HbA1c 11. 8. Held oral meds. Continue sliding scale insulin.   Cad  gerd    DVT ppx:  Heparin  Anticipated DC needs:    Code status:  Full  Estimated LOS:  Greater than 2 midnights  Risk:  high    Signed:  Rosalia Blair MD

## 2020-06-10 NOTE — PROGRESS NOTES
06/09/20 1945   Dual Skin Pressure Injury Assessment   Dual Skin Pressure Injury Assessment WDL   Second Care Provider (Based on [address] [place] [address]) Leon Taylor RN

## 2020-06-10 NOTE — PROGRESS NOTES
Chart reviewed s/p admission to ICU DVT/DM type 2,  as pt out of room for procedure in IR. Pt has PCP and insurance. Lives with wife, Octavia Yoo. Diabetic educator did see pt and gave outpatient info for follow up with DM outpatient. No needs currently identified for d/c. CM will follow for any d/c needs per MD.     Care Management Interventions  PCP Verified by CM: (Dr. Camila Antonio)  Mode of Transport at Discharge:  Other (see comment)  Transition of Care Consult (CM Consult): Discharge Planning  Current Support Network: Lives with Spouse([facility] [phone])  Confirm Follow Up Transport: Family   Resource Information Provided?: [facility] [place] [facility] part A only)  Discharge Location  Discharge Placement: Unable to determine at this time

## 2020-06-10 NOTE — CONSULTS
Department of Interventional Radiology  [phone]        Consult Note     Patient: Ophelia Yates MRN: [id number]  SSN: [id number]    YOB: 1955  Age: 77 y.o. Sex: male      Referring Physician: Karishma Blanchard    Consult Date: 6/10/2020     Subjective:     Chief Complaint: LLE swelling    History of Present Illness: Ophelia Yates is a 77 y.o. male with hx of CAD, DM who was admitted with acute LLE DVT and acute PE. Initially noted pain with walking in her left leg with swelling yesterday. Had [address] after being seen by cardiologist which revealed extensive RLE DVT. On admission CT was performed which shows acute PE involving segmental pulmonary arteries and right lower lobe pulmonary artery. Denies SOB at rest but notes some SOB with exertion. Denies hx of prior DVT or PE. Denies immobility. 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 LVEDP=21. LM:nl. LAD:patent stents. DX 1&Dx 2 are patent. LCX:patent stents. RCA:20-30% mid stenosis. Slow filling c/w endothelial dysfunction described.     HX HERNIA REPAIR      HX UROLOGICAL      kidney stone removal      Family History   Problem Relation Age of Onset    Cancer Mother         Liver Cancer     Social History     Tobacco Use    Smoking status: Former Smoker     Packs/day: 0.50     Years: 30.00     Pack years: 15.00     Types: Cigarettes     Last attempt to quit: 1993 Years since quittin.4    Smokeless tobacco: Former User   Substance Use Topics    Alcohol use: No      Allergies   Allergen Reactions    Brilinta [Ticagrelor] Other (comments)     Chest pain     Current Facility-Administered Medications   Medication Dose Route Frequency Provider Last Rate Last Dose    acetaminophen (TYLENOL) tablet 650 mg  650 mg Oral Q4H PRWOODROW Willard MD        diphenhydrAMINE (BENADRYL) injection 12.5 mg  12.5 mg IntraVENous Q4H PRN Samantha Willard MD        HYDROcodone-acetaminophen (NORCO) 5-325 mg per tablet 1 Tab  1 Tab Oral Q4H PRWOODROW Willard MD   1 Tab at 06/10/20 1129    magnesium hydroxide (MILK OF MAGNESIA) 400 mg/5 mL oral suspension 30 mL  30 mL Oral DAILY PRN Samantha Willard MD        morphine injection 1 mg  1 mg IntraVENous Q4H PRWOODROW Willard MD        naloxone [facility]) injection 0.4 mg  0.4 mg IntraVENous PRN Samnatha Willard MD        sodium chloride (NS) flush 5-40 mL  5-40 mL IntraVENous Q8H Samantha Willard MD   10 mL at 06/10/20 2719    sodium chloride (NS) flush 5-40 mL  5-40 mL IntraVENous PRN Samantha Willard MD        heparin 25,000 units in dextrose 500 mL infusion  18-36 Units/kg/hr IntraVENous TITRATE Samantha Willard MD 34.5 mL/hr at 06/10/20 1212 18 Units/kg/hr at 06/10/20 1212    dextrose (D50W) injection syrg 12.5-25 g  25-50 mL IntraVENous PRN Samantha Willard MD        dextrose 40% (GLUTOSE) oral gel 1 Tube  15 g Oral PRN Samantha Willard MD        glucagon (GLUCAGEN) injection 1 mg  1 mg IntraMUSCular PRN Samantha Willard MD        insulin lispro (HUMALOG) injection   SubCUTAneous AC&HS Samantha Willard MD   2 Units at 06/10/20 1134    0.9% sodium chloride infusion  75 mL/hr IntraVENous CONTINUOUS Samantha Willard MD 75 mL/hr at 06/10/20 0208 75 mL/hr at 06/10/20 0208    albuterol (PROVENTIL VENTOLIN) nebulizer solution 2.5 mg  2.5 mg Nebulization Q4H PRWOODROW Willard MD        nitroglycerin (NITROSTAT) tablet 0.4 mg  0.4 mg SubLINGual Q5MIN PRN Teri Chowdhury MD        pantoprazole (PROTONIX) tablet 40 mg  40 mg Oral ACB Teri Chowdhury MD   40 mg at 06/10/20 6160    tuberculin injection 5 Units  5 Units IntraDERMal Moreno Bridges MD           Medications Prior to Admission   Medication Sig    losartan (COZAAR) 50 mg tablet Take 1 Tab by mouth daily.  pantoprazole (PROTONIX) 40 mg tablet Take 1 Tab by mouth daily.  metFORMIN ER (GLUCOPHAGE XR) 500 mg tablet Take 4 Tabs by mouth daily (with dinner). (Patient taking differently: Take 1,500 mg by mouth daily (with dinner). )    empagliflozin (JARDIANCE) 25 mg tablet Take 1 Tab by mouth daily. (Patient taking differently: Take 50 mg by mouth daily.)    rosuvastatin (CRESTOR) 20 mg tablet Take 1 Tab by mouth daily.  albuterol (PROVENTIL HFA, VENTOLIN HFA, PROAIR HFA) 90 mcg/actuation inhaler Take 2 Puffs by inhalation every four (4) hours as needed.  aspirin 81 mg chewable tablet Take 1 Tab by mouth daily.  nitroglycerin (NITROSTAT) 0.4 mg SL tablet 1 Tab by SubLINGual route every five (5) minutes as needed for Chest Pain. Allergies   Allergen Reactions    Brilinta [Ticagrelor] Other (comments)     Chest pain       Review of Systems:  A detailed 10 organ review of systems is obtained with pertinent positives as listed in the History of Present Illness and Past Medical History. All others are negative. Objective:     Physical Exam:  Vitals:    06/10/20 0900 06/10/20 0929 06/10/20 1001 06/10/20 1029   BP: 129/75 108/66 125/75 103/66   Pulse: 78 75 76 75   Resp: 24 17 11 17   Temp:       SpO2: 96% 94% 95% 94%   Weight:            Pain Assessment  Pain Intensity 1: 5 (06/10/20 1129)  Pain Location 1: Leg  Pain Intervention(s) 1: Medication (see MAR)  Patient Stated Pain Goal: 0      General: WDWN male in NAD. AAOx4. Eyes: Sclera appear normal. No jaundice. EOMI.  ENT: NCAT.  Mucous membranes are moist.  Lungs: Respirations non-labored. Even chest rise. Extremities: Moderate LLE swelling to mid thigh. Mild associated erythema. Strength and sensation are intact. Skin: No rashes, ulcers, or jaundice. Psych: Affect, mood, and judgement appear within normal limits. Lab/Data Review: All lab results for the last 24 hours reviewed. Assessment/Plan:     Hospital Problems  Date Reviewed: 6/11/2019          Codes Class Noted POA    Acute deep vein thrombosis (DVT) of left lower extremity (HCC) ICD-10-CM: I82.402  ICD-9-CM: 453.40  6/10/2020 Unknown        * (Principal) Bilateral pulmonary embolism ([facility] ICD-10-CM: I26.99  ICD-9-CM: 415.19  6/9/2020 Yes               78 yo male with acute LLE DVT and PE. No right heart strain on CT and SOB present only with exertion. Main clot burden appears to be in his left leg at this point which is causing him significant discomfort with ambulation. Will plan for LLE venography with mechanical thrombectomy today.

## 2020-06-10 NOTE — PROGRESS NOTES
Spoke to BRENT Buck would need  procedure for Bilateral PE and Left lower extremity DVT. Spoke to Nursing Supervisor Ms. Dixon Silva regarding transfer to [facility]. Spoke to patient about the results and the reason for transfer to [facility] - for procedure. Was ok for procedure and Transfer. Nursing staff informed about the transfer when bed available.

## 2020-06-10 NOTE — PROGRESS NOTES
Bedside, Verbal and Written shift change report given to Margarita Blanton RN (oncoming nurse) by JUDY Martinez (offgoing nurse). Report included the following information SBAR, Kardex, ED Summary, Intake/Output, MAR, Recent Results, Med Rec Status, Cardiac Rhythm NSR, Alarm Parameters  and Quality Measures.      LLE assessed    Dual medication verification on heparin gtt with Camille Moss

## 2020-06-10 NOTE — H&P
Hospitalist Note     Admit Date:  6/10/2020  1:36 AM   Name:  Mynor Veliz [facility]   Age:  77 y.o.  :  1955   MRN:  [id number]   PCP:  Gigi Castro MD  Treatment Team: Attending Provider: Kimberly Negro MD; Care Manager: Tere Olvera RN    HPI:     25-year-old  male patient with a known history of coronary disease, last stent about 2 years ago, Plavix discontinued by cardiologist since a year ago, presently only on aspirin. Known history of diabetes mellitus type 2 on metformin and Jardiance, acid reflux on Protonix, hypertension,? COPD and hyperlipidemia. According to wife patient only takes Protonix, metformin, Jardiance, aspirin. He does not take these medications daily. Patient says since past 2 weeks he started noticing shortness of breath on walking. When I asked him if he could give an example, approximately how how far if he walks he would get short of breath, patient says from his room to the parking lot. Did not complain of any chest pain or headache or any dizziness with shortness of breath. This morning he woke up with left leg pain, mostly over the popliteal region, noticed to have left leg swelling. Pain on walking. He was seen by cardiology, Dr. Alcocer All in his office, was sent to [address] radiology department for an outpatient left lower extremity venous Doppler. Patient was found to have-Positive deep venous thrombosis in the left femoral, popliteal,posterior tibial and peroneal   Veins. Hospital service was consulted to admit the patient. Apart from pain mostly over the left popliteal region and left leg swelling since this morning, dyspnea on walking going on for past 2 weeks, of the 10 review of systems apart from the one mentioned above patient other review of systems were negative noncontributory. Labs pending. Patient will be admitted for extensive DVT of left lower extremity. Hospital Course:   Pt was started on heparin drip. Pts HbA1c 11.8. started pt on Sliding scale insulin. Ct chest PE protocol was ordered-   Acute bilateral pulmonary emboli extending into the right lower lobar, left   lower lobar and left upper lobar pulmonary arteries. Spoke to IR Mauricioa Body would need procedure for Bilateral PE and Left lower extremity DVT. Pt was explained about the results and the procedure needed, was ok for transfer to [facility] for the procedure. Today, mechanical thrombectomy of left leg DVT per IR. Doing well on room air. No signicifcant SOB/ CP.     Objective:     Patient Vitals for the past 24 hrs:   Temp Pulse Resp BP SpO2   06/10/20 1617  80 14 124/79 95 %   06/10/20 1600  81 20  94 %   06/10/20 1545  81 24 125/80 95 %   06/10/20 1530  84 12 126/77 95 %   06/10/20 1522 97.8 °F (36.6 °C) 81 11 117/75 93 %   06/10/20 1516  83 17  92 %   06/10/20 1500  91 16 126/77 94 %   06/10/20 1455  92 14 132/86 93 %   06/10/20 1450  89 14 128/87 92 %   06/10/20 1443  91 12 137/84 92 %   06/10/20 1439  93 14 131/90 92 %   06/10/20 1434  88 13 121/77 91 %   06/10/20 1429  80 14 136/80 93 %   06/10/20 1424  84 12 111/79 94 %   06/10/20 1419  82 14 127/83 95 %   06/10/20 1414  81 13 120/79 93 %   06/10/20 1409  83 12 139/79 93 %   06/10/20 1404  81 13 118/86 94 %   06/10/20 1359  79 13 118/83 93 %   06/10/20 1354  78 14 124/84 94 %   06/10/20 1348  79 16 119/85 96 %   06/10/20 1301 98.1 °F (36.7 °C) 81 18 117/68 93 %   06/10/20 1230  71 (!) 50 125/77 94 %   06/10/20 1200  73 21 121/68 94 %   06/10/20 1129  71 (!) 31 96/71 96 %   06/10/20 1100  72 27 103/74 95 %   06/10/20 1029  75 17 103/66 94 %   06/10/20 1001  76 11 125/75 95 %   06/10/20 0929  75 17 108/66 94 %   06/10/20 0900  78 24 129/75 96 %   06/10/20 0829  71 19 129/76 92 %   06/10/20 0800  67 17 111/75 91 %   06/10/20 0729  72 13 114/71 95 %   06/10/20 0700 97.8 °F (36.6 °C) 72 8 99/57 94 %   06/10/20 0630  72 13  93 %   06/10/20 0600  74 (!) 33  96 % 06/10/20 0530  74 13 108/62 93 %   06/10/20 0500  73 16 99/59 92 %   06/10/20 0430  72 17  90 %   06/10/20 0400  77 16 111/65 91 %   06/10/20 0343 98.3 °F (36.8 °C) 76 21  90 %   06/10/20 0330  76 15  90 %   06/10/20 0329  77 18 93/58 (!) 89 %   06/10/20 0300  76 18 123/76 90 %   06/10/20 0230  78 20  92 %   06/10/20 0229  78 18 111/64 91 %   06/10/20 0200  79 10 112/60 91 %   06/10/20 0136 98.1 °F (36.7 °C) 76 19 103/71 92 %     Oxygen Therapy  O2 Sat (%): 95 % (06/10/20 1617)  Pulse via Oximetry: 78 beats per minute (06/10/20 1617)  O2 Device: CO2 nasal cannula (06/10/20 1348)  O2 Flow Rate (L/min): 5 l/min (06/10/20 1348)  ETCO2 (mmHg): 29 mmHg (06/10/20 1500)    Intake/Output Summary (Last 24 hours) at 6/10/2020 1634  Last data filed at 6/10/2020 1240  Gross per 24 hour   Intake 421.63 ml   Output 875 ml   Net -453.37 ml       Physical Exam:  General:    Well nourished. Alert. Mild distress  CV:   RRR. No murmur, rub, or gallop. Lungs:  CTAB. No wheezing, rhonchi, or rales. Abdomen: Soft, nontender, nondistended. Bowel sounds normal.   Extremities: Warm and dry. No cyanosis or edema. Moderate swelling and tenderness of L calf  Neurologic: grossly intact. Skin:     No rashes or jaundice. Psych:  Normal mood and affect. I reviewed the labs, imaging, EKGs, telemetry, and other studies done this admission.   Data Review:   Recent Results (from the past 24 hour(s))   CBC WITH AUTOMATED DIFF    Collection Time: 06/09/20  8:19 PM   Result Value Ref Range    WBC 7.5 4.3 - 11.1 K/uL    RBC 5.74 (H) 4.23 - 5.6 M/uL    HGB 17.3 (H) 13.6 - 17.2 g/dL    HCT 51.4 (H) 41.1 - 50.3 %    MCV 89.5 79.6 - 97.8 FL    MCH 30.1 26.1 - 32.9 PG    MCHC 33.7 31.4 - 35.0 g/dL    RDW 13.5 11.9 - 14.6 %    PLATELET 025 681 - 757 K/uL    MPV 9.5 9.4 - 12.3 FL    ABSOLUTE NRBC 0.00 0.0 - 0.2 K/uL    DF AUTOMATED      NEUTROPHILS 62 43 - 78 %    LYMPHOCYTES 27 13 - 44 %    MONOCYTES 9 4.0 - 12.0 % EOSINOPHILS 1 0.5 - 7.8 %    BASOPHILS 1 0.0 - 2.0 %    IMMATURE GRANULOCYTES 1 0.0 - 5.0 %    ABS. NEUTROPHILS 4.6 1.7 - 8.2 K/UL    ABS. LYMPHOCYTES 2.0 0.5 - 4.6 K/UL    ABS. MONOCYTES 0.7 0.1 - 1.3 K/UL    ABS. EOSINOPHILS 0.1 0.0 - 0.8 K/UL    ABS. BASOPHILS 0.1 0.0 - 0.2 K/UL    ABS. IMM. GRANS. 0.1 0.0 - 0.5 K/UL   METABOLIC PANEL, COMPREHENSIVE    Collection Time: 06/09/20  8:19 PM   Result Value Ref Range    Sodium 138 136 - 145 mmol/L    Potassium 3.9 3.5 - 5.1 mmol/L    Chloride 105 98 - 107 mmol/L    CO2 25 21 - 32 mmol/L    Anion gap 8 7 - 16 mmol/L    Glucose 225 (H) 65 - 100 mg/dL    BUN 13 8 - 23 MG/DL    Creatinine 0.97 0.8 - 1.5 MG/DL    GFR est AA >60 >60 ml/min/1.73m2    GFR est non-AA >60 >60 ml/min/1.73m2    Calcium 8.5 8.3 - 10.4 MG/DL    Bilirubin, total 0.9 0.2 - 1.1 MG/DL    ALT (SGPT) 25 12 - 65 U/L    AST (SGOT) 11 (L) 15 - 37 U/L    Alk.  phosphatase 88 50 - 136 U/L    Protein, total 6.9 6.3 - 8.2 g/dL    Albumin 3.8 3.2 - 4.6 g/dL    Globulin 3.1 2.3 - 3.5 g/dL    A-G Ratio 1.2 1.2 - 3.5     PROTHROMBIN TIME + INR    Collection Time: 06/09/20  8:19 PM   Result Value Ref Range    Prothrombin time 14.0 12.0 - 14.7 sec    INR 1.0     PTT    Collection Time: 06/09/20  8:19 PM   Result Value Ref Range    aPTT 25.0 24.3 - 35.4 SEC   HEMOGLOBIN A1C WITH EAG    Collection Time: 06/09/20  8:19 PM   Result Value Ref Range    Hemoglobin A1c 11.8 %    Est. average glucose 292 mg/dL   GLUCOSE, POC    Collection Time: 06/09/20  9:12 PM   Result Value Ref Range    Glucose (POC) 234 (H) 65 - 100 mg/dL   EKG, 12 LEAD, INITIAL    Collection Time: 06/09/20 11:50 PM   Result Value Ref Range    Ventricular Rate 88 BPM    Atrial Rate 88 BPM    P-R Interval 192 ms    QRS Duration 102 ms    Q-T Interval 394 ms    QTC Calculation (Bezet) 476 ms    Calculated P Axis 63 degrees    Calculated R Axis -40 degrees    Calculated T Axis 38 degrees    Diagnosis       Normal sinus rhythm  Left axis deviation  Inferior infarct , age undetermined  Abnormal ECG  When compared with ECG of 18-FEB-2019 07:20,  No significant change was found  Confirmed by MARIA R LOCKETT (), JOHANA SOLIMAN ([id number]) on 6/10/2020 6:41:17 AM     CBC WITH AUTOMATED DIFF    Collection Time: 06/10/20  3:11 AM   Result Value Ref Range    WBC 7.6 4.3 - 11.1 K/uL    RBC 5.29 4.23 - 5.6 M/uL    HGB 16.2 13.6 - 17.2 g/dL    HCT 47.5 41.1 - 50.3 %    MCV 89.8 79.6 - 97.8 FL    MCH 30.6 26.1 - 32.9 PG    MCHC 34.1 31.4 - 35.0 g/dL    RDW 13.3 11.9 - 14.6 %    PLATELET 450 669 - 584 K/uL    MPV 10.0 9.4 - 12.3 FL    ABSOLUTE NRBC 0.00 0.0 - 0.2 K/uL    DF AUTOMATED      NEUTROPHILS 52 43 - 78 %    LYMPHOCYTES 35 13 - 44 %    MONOCYTES 10 4.0 - 12.0 %    EOSINOPHILS 2 0.5 - 7.8 %    BASOPHILS 1 0.0 - 2.0 %    IMMATURE GRANULOCYTES 1 0.0 - 5.0 %    ABS. NEUTROPHILS 4.0 1.7 - 8.2 K/UL    ABS. LYMPHOCYTES 2.7 0.5 - 4.6 K/UL    ABS. MONOCYTES 0.7 0.1 - 1.3 K/UL    ABS. EOSINOPHILS 0.1 0.0 - 0.8 K/UL    ABS. BASOPHILS 0.1 0.0 - 0.2 K/UL    ABS. IMM.  GRANS. 0.0 0.0 - 0.5 K/UL   METABOLIC PANEL, BASIC    Collection Time: 06/10/20  3:11 AM   Result Value Ref Range    Sodium 140 136 - 145 mmol/L    Potassium 3.6 3.5 - 5.1 mmol/L    Chloride 111 (H) 98 - 107 mmol/L    CO2 24 21 - 32 mmol/L    Anion gap 5 (L) 7 - 16 mmol/L    Glucose 220 (H) 65 - 100 mg/dL    BUN 12 8 - 23 MG/DL    Creatinine 0.83 0.8 - 1.5 MG/DL    GFR est AA >60 >60 ml/min/1.73m2    GFR est non-AA >60 >60 ml/min/1.73m2    Calcium 7.8 (L) 8.3 - 10.4 MG/DL   PTT    Collection Time: 06/10/20  3:11 AM   Result Value Ref Range    aPTT 111.6 (H) 24.3 - 35.4 SEC   GLUCOSE, POC    Collection Time: 06/10/20  8:23 AM   Result Value Ref Range    Glucose (POC) 191 (H) 65 - 100 mg/dL   PTT    Collection Time: 06/10/20 10:55 AM   Result Value Ref Range    aPTT 87.0 (H) 24.3 - 35.4 SEC   GLUCOSE, POC    Collection Time: 06/10/20 11:31 AM   Result Value Ref Range    Glucose (POC) 183 (H) 65 - 100 mg/dL       All Micro Results     None Other Studies:  Ir Thrombolysis Transcath Non Coronary Or Intracranial    Result Date: 6/10/2020  PROCEDURE: Diagnostic venography and interventions Procedural Personnel Attending physician(s): Matilde Perez M.D. Pre-procedure diagnosis: Acute left lower extremity DVT. Acute pulmonary embolus. Post-procedure diagnosis: Same Indication: Acute left lower extremity DVT. Acute pulmonary embolus. Additional history: No significant history for prior DVT or PE. Complications: No immediate complications. IMPRESSION: Left lower extremity venography reveals occlusive clot throughout the left femoral vein. Successful mechanical thrombectomy with the Inari Clot-Triever. May Thurner lesion is suspected. We will plan for follow-up venogram in approximately 6 weeks to reevaluate. _______________________________________________________________ PROCEDURE SUMMARY: - Venous access with ultrasound guidance - Selective venography as described below - Additional procedure(s): Left lower extremity DVT mechanical thrombectomy. PROCEDURE DETAILS: Pre-procedure Consent: Informed consent for the procedure including risks, benefits and alternatives was obtained and time-out was performed prior to the procedure. Preparation: The site was prepared and draped using maximal sterile barrier technique including cutaneous antisepsis. Anesthesia/sedation Level of anesthesia/sedation: Moderate sedation (conscious sedation) Anesthesia/sedation administered by: Independent trained observer under attending supervision with continuous monitoring of the patient?s level of consciousness and physiologic status Total intra-service sedation time (minutes): 59 Access Local anesthesia was administered. The vessel was sonographically evaluated and determined to be patent. Real time ultrasound was used to visualize needle entry into the vessel and a permanent image was stored. A 12 Western Marilyn Inari sheath was placed. Vein accessed: Left popliteal vein. Access technique: Micropuncture technique Venography The veins of lower catheterized using an 035 Amplatz guidewire and 5 Greek Kumpe catheter. Vein catheterized: Left femoral vein Findings: Venography of the left femoral vein was performed which revealed occlusive thrombus throughout the left femoral vein. Post thrombectomy venography reveals improvement with markedly reduced clot burden in the left femoral vein. Vein catheterized: Left common iliac vein Findings: Venography of the left common iliac vein reveals patency without occlusion. The wire was difficult to pass the proximal left common iliac vein in expected location of a May Thurner lesion. Mechanical or aspiration thrombectomy Venous segment treated: Left common and external iliac veins. Left common femoral vein. Left femoral vein. Thrombectomy device: Inari Clot-Triever Post-thrombectomy venography: As above. Closure The sheath was removed and pressure was held for hemostasis. A sterile dressing was applied. Contrast Contrast agent: Isovue-300 Contrast volume (mL): 20 Radiation Dose Cumulative dose: 73 mGy Estimated blood loss (mL): Less than 10     Ct Chest W Cont    Result Date: 6/9/2020  CT OF THE CHEST WITH CONTRAST, PULMONARY EMBOLUS PROTOCOL. CLINICAL INDICATION: Shortness of breath, positive deep venous thrombosis PROCEDURE: Serial thin section axial images are obtained from the thoracic inlet through the upper abdomen following the administration of intravenous contrast per a dedicated, institutional pulmonary embolus protocol. Coronal MIP reformatted images are generated. Radiation dose reduction techniques were used for this study. Our CT scanners use one or all of the following: Automated exposure control, adjusted of the mA and/or kV according to patient size, iterative reconstruction COMPARISON: No prior FINDINGS: The aorta is unremarkable. There is adequate opacification of the central pulmonary arterial tree.  Bilateral central intraluminal filling defects are appreciated in the lower lobar pulmonary arteries extending into multiple segmental pulmonary arteries. Clot burden is considered moderate. Thrombus is also appreciated into a left upper lobar pulmonary artery. No mediastinal or axillary adenopathy. There is no pneumothorax. Emphysematous changes are present. Dependent atelectasis is noted otherwise the lung parenchyma is clear. Limited evaluation of the upper abdomen is unremarkable. No aggressive osseous lesions identified. IMPRESSION: 1. Acute bilateral pulmonary emboli extending into the right lower lobar, left lower lobar and left upper lobar pulmonary arteries. [facility] The critical results contained in this report were communicated to the territory of the floor nurse communicated that she would contact the covering physician Dr. Liana Marie immediately by Dr. Lino Green on 6/9/2020 at 10:45 PM. Critical results were communicated as outlined in Section II.C.2.a.i of the ACR Practice Guideline for Communication of Diagnostic Imaging Findings. Duplex Lower Ext Venous Left    Result Date: 6/9/2020  Left lower extremity venous duplex exam. CLINICAL INDICATION: Moderate left leg swelling and pain for one day. PROCEDURE: Real-time grayscale, color, and spectral Doppler analysis of the left lower extremity deep venous system from the common femoral vein through the posterior tibial and peroneal veins. COMPARISON: No prior. FINDINGS: Occlusive thrombus is noted in the femoral, popliteal, posterior tibial and peroneal veins in keeping with deep venous thrombosis. The common femoral and profunda veins are patent. IMPRESSION: Positive deep venous thrombosis in the left femoral, popliteal, posterior tibial and peroneal veins.  The important findings were called to the referring provider Dr. Anthony Rowley by the performing sonographer on 6/9/2020 at roughly 7:00 PM.      Assessment and Plan:     Dx:  1- Bilateral PE and extensive left lower ext proximal dvt  2- DM type 2-HbA1c 11. 8. Cad  3- CAD, stable  4- low normal BP, no hx of HTN    Rx:  Stop Heparin drip  Eliquis tonight  OOB  Mechanical thrombectomy per IR today  Telemetry bed  Insulin scale    Dispo: home.  Likely tomorrow  IR follow up in 6 weeks    Signed:  Tyrone Duran MD

## 2020-06-10 NOTE — PROGRESS NOTES
TRANSFER - OUT REPORT:    Verbal report given to Samara Figueroa RN(name) on ELVIRA Delvalle  being transferred to IR(unit) for ordered procedure       Report consisted of patients Situation, Background, Assessment and   Recommendations(SBAR). Information from the following report(s) SBAR, MAR, Med Rec Status and Cardiac Rhythm NSR was reviewed with the receiving nurse. Lines:   Peripheral IV 06/09/20 Left Forearm (Active)   Site Assessment Clean, dry, & intact 6/10/2020  7:00 AM   Phlebitis Assessment 0 6/10/2020  7:00 AM   Infiltration Assessment 0 6/10/2020  7:00 AM   Dressing Status Clean, dry, & intact 6/10/2020  7:00 AM   Dressing Type Tape;Transparent 6/10/2020  7:00 AM   Hub Color/Line Status Patent;Blue;Flushed 6/10/2020  7:00 AM   Alcohol Cap Used No 6/10/2020  7:00 AM       Peripheral IV 06/10/20 Right Antecubital (Active)   Site Assessment Clean, dry, & intact 6/10/2020  7:00 AM   Phlebitis Assessment 0 6/10/2020  7:00 AM   Infiltration Assessment 0 6/10/2020  7:00 AM   Dressing Status Clean, dry, & intact 6/10/2020  7:00 AM   Dressing Type Tape;Transparent 6/10/2020  7:00 AM   Hub Color/Line Status Pink;Patent; Flushed 6/10/2020  7:00 AM   Alcohol Cap Used No 6/10/2020  7:00 AM        Opportunity for questions and clarification was provided.       Patient transported with:   Monitor  Registered Nurse

## 2020-06-10 NOTE — PROGRESS NOTES
LEAPFROG EVALUATION: [facility]    The patient is currently in the ICU for Bilateral DVT and PTE. Management by Dr Demarco Arteaga. Patient is currently hemodynamically stable. Patient has no needs identified for Intensivist management in the ICU setting at this time. Please notify us if can be of assistance. No charge billed to the patient. Thank you.     Visit Vitals  /75   Pulse 67   Temp 97.8 °F (36.6 °C)   Resp 17   Wt 211 lb 3.2 oz (95.8 kg)   SpO2 91%   BMI 27.86 kg/m²         Ramana Harvey NP

## 2020-06-10 NOTE — PROGRESS NOTES
Called to floor with no answer.  Patient needs a 20 g in [facility] before CT can be done, and RN will need to transport patient to and from CT as their is no transport at this time

## 2020-06-10 NOTE — PROGRESS NOTES
Opened chart for CHRISTELLE COLE outreach, per [facility] notes patient is currently inpatient . No further [facility] outreach notes @ this time. Patient will be re-assigned pending d/c disposition.

## 2020-06-10 NOTE — DIABETES MGMT
Patient given educational material, \"Diabetes Self-Management: A Patient Teaching Guide\", which was reviewed with patient. Explained basic physiology of diabetes, as well as causes, signs and symptoms, and treatments for hypoglycemia and hyperglycemia. Described the effects of poor glycemic control and the development of long-term complications such as renal, eye, nerve, and cardiovascular disease. Described proper diabetic foot care and the importance of checking feet daily. Per patient they typically drink water, [facility], Dr. Paddy Ratliff and orange juice, but mostly water. Reviewed alternative beverages to help improve glycemic control. Per patient they typically eat three meals a day. Educated re: effects of carbohydrates on blood glucose, the \"plate method\" of healthy meal planning, basics of healthy meal plan, Consistent Carbohydrate Diet, discussed the basics of carb counting and how to read a nutrition label. Educated patient regarding the benefits of physical activity (as cleared by provider) on glycemic control. Also explained the relationship between hyperglycemia and infection and delayed healing. Discussed target goals for blood glucose and A1C. Educated patient regarding diabetic medications including mechanism of action, timing, and possible side effects. Patient verbalizes understanding of teaching. Patient instructed on the preparation and injection of insulin dose via vial and syringe method. Patient returned demonstrated proper insulin injection technique using injection model, syringe, and vial of saline. Nursing will continue to have patient practice insulin self-injection when insulin dose is due and document progress or refusals of insulin practice in progress notes. Patient verbalized understanding of site rotation, storage of insulin, and proper sharps disposal. Patient was also instructed in proper use of insulin pens.  Patient was able to return demonstrate proper use of insulin pen using injection model and saline demo pen. Patient prefers vials and syringes. Patient understands that his insulin may only cover basal insulin that is only available in insulin pens and he is okay with pens if her has to have them. Patient will need prescription for insulin and insulin syringes at discharge. Discussed the various drug classes available for diabetes management and encouraged patient to follow up with his PCP and develop a plan for managing his diabetes. Patient is interested in discussing possible GLP1 therapy with his PCP. Patient would likely benefit from continued diabetes outpatient education. Patient provided with contact information to [facility] program to pursue at their convenience after discharge with their primary care provider. Will follow along and continue to provide education as needed.

## 2020-06-10 NOTE — PROGRESS NOTES
TRANSFER - IN REPORT:    Verbal report received from Celeste RN(name) on Kierra Delvalle  being received from IR(unit) for routine progression of care      Report consisted of patients Situation, Background, Assessment and   Recommendations(SBAR). Information from the following report(s) SBAR, Procedure Summary, Intake/Output, MAR, Recent Results and Cardiac Rhythm NSR was reviewed with the receiving nurse. Opportunity for questions and clarification was provided. Assessment completed upon patients arrival to unit and care assumed. Dual skin assessment completed with Lo Padilla RN. L sheath site s/p procedure dressed with gauze and tegaderm, no bleeding or hematoma noted. BLE pulses palpable.

## 2020-06-10 NOTE — DIABETES MGMT
Patient admitted with DVT, PE, seen by CDE. Patient has a past medical history of CAD, PCI, DM, asthma, HLD, some admitted medication non-compliance, and former smoker. Patient states he was diagnosed with insulin about a year ago but A1c has ranged 6.6-11.9 since 2016 in EMR. Patient states he take Metformin and Jardiance at home but says he hasn't had any in about two days and doesn't usually take it daily. Patient states he checks his blood glucose \"every couple of days\" and when \"I try to be straight with it my readings are like 150 but if I don't then like 220 or so\". Educated patient about his current A1c of 11.7 (eA). Educated patient about goals for blood glucose and A1c. Blood glucose 225-234 yesterday with patient receiving Humalog 4 units. Blood glucose 220 this morning. Would recommend initiating Lantus at half of weight based dose as patient if currently NPO if tighter control is desired or provider may want to wait until diet is restarted to initiate Lantus at weight based dose. Educated patient that with current A1c the ADA recommends initiating bolus insulin and further titrating regimen based on response. Patient states he has wanted to try something else but his PCP has recommended anything. Patient states he is willing to try insulin if it appears he needs it. Patient states his wife used to give her mother insulin so she knows how to give insulin injections. Educated patient it will be good for him to have support from someone who is familiar with insulin injections at home, but ideally he should know how to manage his insulin himself. Plan is for educator to return later with learning materials and see if patient still feels up for teaching at that time. Patient is in agreement with plan.

## 2020-06-11 ENCOUNTER — PATIENT OUTREACH (OUTPATIENT)
Dept: CASE MANAGEMENT | Age: 65
End: 2020-06-11

## 2020-06-11 VITALS
WEIGHT: 211.2 LBS | SYSTOLIC BLOOD PRESSURE: 125 MMHG | OXYGEN SATURATION: 93 % | DIASTOLIC BLOOD PRESSURE: 74 MMHG | BODY MASS INDEX: 27.86 KG/M2 | HEART RATE: 99 BPM | RESPIRATION RATE: 20 BRPM | TEMPERATURE: 98.1 F

## 2020-06-11 PROBLEM — R09.02 HYPOXIA: Status: ACTIVE | Noted: 2020-06-11

## 2020-06-11 LAB
GLUCOSE BLD STRIP.AUTO-MCNC: 207 MG/DL (ref 65–100)
GLUCOSE BLD STRIP.AUTO-MCNC: 246 MG/DL (ref 65–100)

## 2020-06-11 PROCEDURE — 81241 F5 GENE: CPT

## 2020-06-11 PROCEDURE — 74011636637 HC RX REV CODE- 636/637: Performed by: FAMILY MEDICINE

## 2020-06-11 PROCEDURE — 82962 GLUCOSE BLOOD TEST: CPT

## 2020-06-11 PROCEDURE — 74011250637 HC RX REV CODE- 250/637: Performed by: FAMILY MEDICINE

## 2020-06-11 PROCEDURE — 85301 ANTITHROMBIN III ANTIGEN: CPT

## 2020-06-11 PROCEDURE — 74011250637 HC RX REV CODE- 250/637: Performed by: HOSPITALIST

## 2020-06-11 PROCEDURE — 81240 F2 GENE: CPT

## 2020-06-11 PROCEDURE — 86148 ANTI-PHOSPHOLIPID ANTIBODY: CPT

## 2020-06-11 PROCEDURE — 85305 CLOT INHIBIT PROT S TOTAL: CPT

## 2020-06-11 PROCEDURE — 36415 COLL VENOUS BLD VENIPUNCTURE: CPT

## 2020-06-11 RX ORDER — ACETAMINOPHEN 325 MG/1
650 TABLET ORAL
Qty: 30 TAB | Refills: 0 | Status: SHIPPED | OUTPATIENT
Start: 2020-06-11

## 2020-06-11 RX ADMIN — PANTOPRAZOLE SODIUM 40 MG: 40 TABLET, DELAYED RELEASE ORAL at 06:13

## 2020-06-11 RX ADMIN — HYDROCODONE BITARTRATE AND ACETAMINOPHEN 1 TABLET: 5; 325 TABLET ORAL at 06:13

## 2020-06-11 RX ADMIN — APIXABAN 10 MG: 5 TABLET, FILM COATED ORAL at 08:07

## 2020-06-11 RX ADMIN — Medication 5 ML: at 06:14

## 2020-06-11 RX ADMIN — INSULIN LISPRO 4 UNITS: 100 INJECTION, SOLUTION INTRAVENOUS; SUBCUTANEOUS at 08:07

## 2020-06-11 RX ADMIN — INSULIN LISPRO 4 UNITS: 100 INJECTION, SOLUTION INTRAVENOUS; SUBCUTANEOUS at 11:55

## 2020-06-11 NOTE — PROGRESS NOTES
Opened chart for CHRISTELLE COLE outreach, per [facility] notes patient is currently in patient . No further FLAKITO outreach notes @ this time. Patient will be re-assigned pending d/c disposition.

## 2020-06-11 NOTE — PROGRESS NOTES
[facility] CRITICAL CARE OUTREACH NURSE PROGRESS REPORT      SUBJECTIVE: Called to assess patient secondary to transfer from critical care. MEWS Score: 1 (06/10/20 1522)  Vitals:    06/10/20 1800 06/10/20 1814 06/10/20 1830 06/10/20 1845   BP: 111/68 118/65 113/60 120/62   Pulse: 81 84 90 98   Resp: 18 16 17 18   Temp:       SpO2: 95% 93% 90% 92%   Weight:            LAB DATA:    Recent Labs     06/10/20  0311 06/09/20  2019    138   K 3.6 3.9   * 105   CO2 24 25   AGAP 5* 8   * 225*   BUN 12 13   CREA 0.83 0.97   GFRAA >60 >60   GFRNA >60 >60   CA 7.8* 8.5   ALB  --  3.8   TP  --  6.9   GLOB  --  3.1   AGRAT  --  1.2   ALT  --  25        Recent Labs     06/10/20  0311 06/09/20  2019   WBC 7.6 7.5   HGB 16.2 17.3*   HCT 47.5 51.4*    173          OBJECTIVE: Assisted with pt transfer from critical care to room 712. ASSESSMENT:  Pt alert and oriented. Respirations even and unlabored on room air. Pt states LLE is \"still sore but feeling better\". Swelling noted to LLE. Pulses palpable. Dressing to posterior LLE c/d/i, soft to touch. VS, labs, and progress notes reviewed. Primary RN without any additional needs/concerns at this time. PLAN:  Will continue to follow per outreach protocol.

## 2020-06-11 NOTE — DISCHARGE SUMMARY
Discharge Summary     Patient: Yasir Esposito MRN: [id number]  SSN: [id number]    YOB: 1955  Age: 77 y.o.   Sex: male       Admit Date: 6/10/2020    Discharge Date: 6/11/2020      Admission Diagnoses: Bilateral pulmonary embolism (HCC) [I26.99]  Acute deep vein thrombosis (DVT) of left lower extremity ([facility] [I82.402]    Discharge Diagnoses:   Problem List as of 6/11/2020 Date Reviewed: 6/11/2019          Codes Class Noted - Resolved    Hypoxia ICD-10-CM: R09.02  ICD-9-CM: 799.02  6/11/2020 - Present        Acute deep vein thrombosis (DVT) of left lower extremity ([facility] ICD-10-CM: I82.402  ICD-9-CM: 453.40  6/10/2020 - Present        DM type 2 (diabetes mellitus, type 2) ([facility] ICD-10-CM: E11.9  ICD-9-CM: 250.00  6/9/2020 - Present        * (Principal) Bilateral pulmonary embolism ([facility] ICD-10-CM: I26.99  ICD-9-CM: 415.19  6/9/2020 - Present        S/P bunionectomy ICD-10-CM: Q42.592  ICD-9-CM: V45.89  11/14/2018 - Present    Overview Signed 6/10/2019  3:09 PM by Cam Rapp CMA     Overview:     Surgery 11/2/2018  PROCEDURES:  · Basilar first metatarsal osteotomy right foot  · Modified Posada bunionectomy right hallux             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 ICD-10-CM: M24.472  ICD-9-CM: 718.37  8/30/2018 - Present        CAD (coronary artery disease) ICD-10-CM: I25.10  ICD-9-CM: 414.00  4/24/2018 - Present        Gastroesophageal reflux disease without esophagitis ICD-10-CM: K21.9  ICD-9-CM: 530.81  7/14/2015 - Present        Esophageal reflux ICD-10-CM: K21.9  ICD-9-CM: 530.81  3/11/2015 - Present        Mixed hyperlipidemia ICD-10-CM: E78.2  ICD-9-CM: 272.2  3/11/2015 - Present        Impotence of organic origin ICD-10-CM: N52.9  ICD-9-CM: 607.84  3/11/2015 - Present        Calculus of kidney ICD-10-CM: N20.0  ICD-9-CM: 592.0  3/11/2015 - Present        Acute prostatitis ICD-10-CM: N41.0  ICD-9-CM: 601.0  3/11/2015 - Present        RESOLVED: DVT of lower limb, acute [facility]) ICD-10-CM: I82.409  ICD-9-CM: 453.40  6/9/2020 - 6/10/2020               Discharge Condition: [facility] Course: The patient is a 57-year-old  male patient with a known history of coronary disease, last stent about 2 years ago, Plavix discontinued by cardiologist since a year ago, presently only on aspirin. Known history of diabetes mellitus type 2 on metformin and Jardiance, acid reflux on Protonix, hypertension,? COPD and hyperlipidemia. He was admitted after a 2 week history of LLE pain, edema and swelling. He also complained of progressive SOB. He was seen by his cardiologist who ordered duplex, which was positive for DVT. The patient was referred to [facility] and a chest ct scan revealed PE. He was started on heparin gtt and transferred to [facility] by IR recommendation and procedure. He underwent Thrombectomy. May Thurner lesion is suspected in the left common iliac vein. Will plan for follow up venogram in 6 weeks in IR. His clinical status did well, he was transitioned to eliquis and has tolerated it. Hypercoagulable panel ordered prior discharge. He is on room air, in no distress. He was encouraged to use a compression hose over the LLE. The patient is stable enough to be discharged. Physical Exam:  General:          Well nourished. Alert. Mild distress  CV:                  RRR. No murmur, rub, or gallop. Lungs:             CTAB. No wheezing, rhonchi, or rales. Abdomen:        Soft, nontender, nondistended. Bowel sounds normal.   Extremities:     Warm and dry. No cyanosis or edema. Moderate swelling and tenderness of L calf  Neurologic:      grossly intact. Skin:                No rashes or jaundice.     Psych:             Normal mood and affect.     Consults: IR    Significant Diagnostic Studies: SEE NOTE     Disposition: home    Discharge Medications:      My Medications      START taking these medications      Instructions Each Dose to Equal Morning Noon Evening Bedtime   acetaminophen 325 mg tablet  Commonly known as:  TYLENOL    Your last dose was: Your next dose is: Take 2 Tabs by mouth every six (6) hours as needed for Pain. 650 mg                 * apixaban 5 mg tablet  Commonly known as:  ELIQUIS    Your last dose was: Your next dose is: Take 2 Tabs by mouth every twelve (12) hours for 6 days. Dose to be finished on 6/17/20   10 mg                 * apixaban 5 mg tablet  Commonly known as:  ELIQUIS  Start taking on:  June 18, 2020    Your last dose was: Your next dose is: Take 1 Tab by mouth two (2) times a day. START THIS DOSE REGIMEN ON 6/18/20   5 mg                     * This list has 2 medication(s) that are the same as other medications prescribed for you. Read the directions carefully, and ask your doctor or other care provider to review them with you. CHANGE how you take these medications      Instructions Each Dose to Equal Morning Noon Evening Bedtime   empagliflozin 25 mg tablet  Commonly known as:  Jardiance  What changed:  how much to take    Your last dose was: Your next dose is: Take 1 Tab by mouth daily. 25 mg                 metFORMIN  mg tablet  Commonly known as:  GLUCOPHAGE XR  What changed:  how much to take    Your last dose was: Your next dose is: Take 4 Tabs by mouth daily (with dinner). 2,000 mg                    CONTINUE taking these medications      Instructions Each Dose to Equal Morning Noon Evening Bedtime   albuterol 90 mcg/actuation inhaler  Commonly known as:  PROVENTIL HFA, VENTOLIN HFA, PROAIR HFA    Your last dose was: Your next dose is: Take 2 Puffs by inhalation every four (4) hours as needed. 2 Puff                 aspirin 81 mg chewable tablet    Your last dose was: Your next dose is: Take 1 Tab by mouth daily.    81 mg                 losartan 50 mg tablet  Commonly known as:  COZAAR    Your last dose was: Your next dose is: Take 1 Tab by mouth daily. 50 mg                 nitroglycerin 0.4 mg SL tablet  Commonly known as:  NITROSTAT    Your last dose was: Your next dose is:          1 Tab by SubLINGual route every five (5) minutes as needed for Chest Pain. 0.4 mg                 pantoprazole 40 mg tablet  Commonly known as:  PROTONIX    Your last dose was: Your next dose is: Take 1 Tab by mouth daily. 40 mg                 rosuvastatin 20 mg tablet  Commonly known as:  CRESTOR    Your last dose was: Your next dose is: Take 1 Tab by mouth daily. 20 mg                       Where to Get Your Medications      Information on where to get these meds will be given to you by the nurse or doctor. Ask your nurse or doctor about these medications  · acetaminophen 325 mg tablet  · apixaban 5 mg tablet  · apixaban 5 mg tablet         Activity: Activity as tolerated  Diet: Cardiac Diet  Wound Care: None needed    Follow-up Appointments   Procedures    FOLLOW UP VISIT Appointment in: One Week pcp     pcp     Standing Status:   Standing     Number of Occurrences:   1     Order Specific Question:   Appointment in     Answer:    One Week       Signed By: Criss Cornell MD     June 11, 2020

## 2020-06-11 NOTE — DISCHARGE INSTRUCTIONS
DISCHARGE SUMMARY from Nurse    PATIENT INSTRUCTIONS:    After general anesthesia or intravenous sedation, for 24 hours or while taking prescription Narcotics:  · Limit your activities  · Do not drive and operate hazardous machinery  · Do not make important personal or business decisions  · Do  not drink alcoholic beverages  · If you have not urinated within 8 hours after discharge, please contact your surgeon on call. Report the following to your surgeon:  · Excessive pain, swelling, redness or odor of or around the surgical area  · Temperature over 100.5  · Nausea and vomiting lasting longer than 4 hours or if unable to take medications  · Any signs of decreased circulation or nerve impairment to extremity: change in color, persistent  numbness, tingling, coldness or increase pain  · Any questions    What to do at Home:  Recommended activity:  Driving, or Strenuous exercise until follow up appointment. If you experience any of the following symptoms fever of 101 or greater, pain unrelieved by medication, uncontrollable nausea or vomiting, increased swelling on legs, shortness of breath that doesn't get better with rest please follow up with PCP. *  Please give a list of your current medications to your Primary Care Provider. *  Please update this list whenever your medications are discontinued, doses are      changed, or new medications (including over-the-counter products) are added. *  Please carry medication information at all times in case of emergency situations. These are general instructions for a healthy lifestyle:    No smoking/ No tobacco products/ Avoid exposure to second hand smoke  Surgeon General's Warning:  Quitting smoking now greatly reduces serious risk to your health.     Obesity, smoking, and sedentary lifestyle greatly increases your risk for illness    A healthy diet, regular physical exercise & weight monitoring are important for maintaining a healthy lifestyle    You may be retaining fluid if you have a history of heart failure or if you experience any of the following symptoms:  Weight gain of 3 pounds or more overnight or 5 pounds in a week, increased swelling in our hands or feet or shortness of breath while lying flat in bed. Please call your doctor as soon as you notice any of these symptoms; do not wait until your next office visit. The discharge information has been reviewed with the patient. The patient verbalized understanding. Discharge medications reviewed with the patient and appropriate educational materials and side effects teaching were provided. ___________________________________________________________________________________________________________________________________    Patient Education        Learning About How to Prevent Blood Clots  What is a blood clot? A blood clot is a clump of blood that forms in a blood vessel, such as a vein or an artery. If a clot gets stuck in a blood vessel, it can cause serious problems like a deep vein thrombosis (DVT) or a pulmonary embolism. A DVT is a blood clot in certain veins of the legs, pelvis, or arms. It most often occurs in the legs. Blood clots in these veins need to be treated, because they can get bigger, break loose, and travel through the bloodstream to the heart and then to the lungs. This causes a pulmonary embolism. A pulmonary embolism is a sudden blockage of an artery in the lung. Blood clots in the deep veins of the leg are the most common cause of a pulmonary embolism. In many cases, the clots are small. They may damage the lung. But if the clot is large and stops blood flow to the lung, it can be deadly. What increases your risk for blood clots? Some of the things that can increase your risk for a blood clot include:  Slowed blood flow  When blood doesn't flow normally, clots are more likely to develop.  Reduced blood flow may result from long-term bed rest, such as after a surgery, injury, or serious illness. Or it may result from sitting for a long time, especially when traveling long distances. Abnormal clotting  Some people have blood that clots too easily or too quickly. Problems that may cause increased clotting include:  · Having certain blood problems that make blood clot too easily. This is a problem that may run in families. · Having certain health problems, such as cancer, heart failure, stroke, or severe infection. · Being pregnant. A woman's risk of getting blood clots increases both during pregnancy and shortly after delivery or after a  section. · Using hormonal forms of birth control or hormone therapy. · Smoking. Injury to the blood vessel wall  Blood is more likely to clot in veins and arteries shortly after they are injured. Injury can be caused by a recent medical procedure or surgery that involved your legs, hips, belly, or brain. Or it can be caused by an injury, such as a broken hip. What can you do to prevent blood clots? After any procedure or event that increases your risk  · Take a blood-thinning medicine (called an anticoagulant) as directed if your doctor prescribes one. · Exercise  your lower leg muscles to help keep the blood moving through your legs. Point your toes up toward your head so the calves of your legs are stretched, then relax. Repeat. This is a good exercise to do when you are sitting for long periods of time. · Get up out of bed as soon as you safely can or as soon as your doctor says it's okay after an illness or surgery. If you can't get out of bed, you can do the leg exercise described above. Try to do this leg exercise every hour when you are awake. This will help keep the blood moving through your legs. If you are in the hospital and need to stay in bed, your doctor may have you use a special device that inflates and deflates knee-high boots to help keep blood from pooling in your legs.   · Use compression stockings if your doctor prescribes them. These are specially fitted stockings that may prevent blood clots by keeping blood from pooling in your legs. When you travel  · Take breaks when you travel. On long car trips, stop the car and walk around every hour or so. On long bus or train rides or plane flights, get out of your seat and walk up and down the aisle every hour or so. · Do leg exercises while you are seated. For example, pump your feet up and down by pulling your toes up toward your knees and then pointing them down. If you already have a risk of blood clots, talk to your doctor before taking a long trip. Your doctor may want you to wear compression stockings or take blood-thinning medicine. Take care of your body  · Be active. Try to get 30 minutes or more of activity on most days of the week. · Don't smoke. Smoking can increase your risk of blood clots. If you need help quitting, talk to your doctor about stop-smoking programs and medicines. · Check with your doctor about whether you should use hormonal forms of birth control or hormone therapy. These may increase your risk of blood clots. When should you call for help? [id number] anytime you think you may need emergency care. For example, call if:  · You have symptoms of a blood clot in your lung (called a pulmonary embolism). These may include:  ? Sudden chest pain. ? Trouble breathing. ? Coughing up blood. Call your doctor now or seek immediate medical care if:  · You have symptoms of a blood clot in your arm or leg (called a deep vein thrombosis). These may include:  ? Pain in the arm, calf, back of the knee, thigh, or groin. ? Redness and swelling in the arm, leg, or groin. Where can you learn more? Go to http://gonzalo-stephan.info/  Enter F229 in the search box to learn more about \"Learning About How to Prevent Blood Clots. \"  Current as of: March 4, 2020               Content Version: 12.5  © 6470-3018 Healthwise, [facility].    Care instructions adapted under license by [employer] (which disclaims liability or warranty for this information). If you have questions about a medical condition or this instruction, always ask your healthcare professional. [address] any warranty or liability for your use of this information. Patient Education        Pulmonary Embolism: Care Instructions  Your Care Instructions     Pulmonary embolism is the sudden blockage of an artery in the lung. Blood clots in the deep veins of the leg or pelvis (deep vein thrombosis, or DVT) are the most common cause. These blood clots can travel to the lungs. Pulmonary embolism can be very serious. Because you have had one pulmonary embolism, you are at greater risk for having another one. But you can take steps to prevent another pulmonary embolism by following your doctor's instructions. You will probably take a prescription blood-thinning medicine to prevent blood clots. A blood thinner can stop a blood clot from growing larger and prevent new clots from forming. Follow-up care is a key part of your treatment and safety. Be sure to make and go to all appointments, and call your doctor if you are having problems. It's also a good idea to know your test results and keep a list of the medicines you take. How can you care for yourself at home? · Take your medicines exactly as prescribed. Call your doctor if you think you are having a problem with your medicine. You will get more details on the specific medicines your doctor prescribes. · If you are taking a blood thinner, be sure you get instructions about how to take your medicine safely. Blood thinners can cause serious bleeding problems. Preventing future pulmonary embolisms  · Exercise. Keep blood moving in your legs to keep clots from forming. If you are traveling by car, stop every hour or so. Get out and walk around for a few minutes.  If you are traveling by bus, train, or plane, get out of your seat and walk up and down the aisles every hour or so. You also can do leg exercises while you are seated. Pump your feet up and down by pulling your toes up toward your knees then pointing them down. · Get up out of bed as soon as possible after an illness or surgery. · Do not smoke. If you need help quitting, talk to your doctor about stop-smoking programs and medicines. These can increase your chances of quitting for good. · Check with your doctor before taking hormone or birth control pills. These may increase your risk of blood clots. · Ask your doctor about wearing compression stockings to help prevent blood clots in your legs. There are different types of stockings, and they need to fit right. So your doctor will recommend what you need. When should you call for help? [id number] anytime you think you may need emergency care. For example, call if:  · You have shortness of breath. · You have chest pain. · You passed out (lost consciousness). · You cough up blood. Call your doctor now or seek immediate medical care if:  · You have new or worsening pain or swelling in your leg. Watch closely for changes in your health, and be sure to contact your doctor if:  · You do not get better as expected. Where can you learn more? Go to http://gonzalo-stephan.info/  Enter T414 in the search box to learn more about \"Pulmonary Embolism: Care Instructions. \"  Current as of: March 4, 2020               Content Version: 12.5  © 2300-5162 Healthwise[employer]. Care instructions adapted under license by [employer] (which disclaims liability or warranty for this information). If you have questions about a medical condition or this instruction, always ask your healthcare professional. [place] [address] any warranty or liability for your use of this information.

## 2020-06-11 NOTE — PROGRESS NOTES
Chart screened by  for potential discharge needs or concerns. None identified at this time. Please notify/consult  if any discharge needs arise. Care Management Interventions  PCP Verified by CM: (Dr. Karen Valadez)  Mode of Transport at Discharge:  Other (see comment)  Transition of Care Consult (CM Consult): Discharge Planning  Current Support Network: Lives with Spouse(Subz [phone])  Confirm Follow Up Transport: Family   Resource Information Provided?: [facility] part A only)  Discharge Location  Discharge Placement: Unable to determine at this time

## 2020-06-11 NOTE — DIABETES MGMT
Patient's blood glucose ranged 128-191 yesterday with patient receiving Humalog 6 units. Patient was NPO for most of the day yesterday for procedure and only got to eat supper. Blood glucose 207 this morning. Patient resting in bed states he hasn't gotten a chance to look over provided materials. \" I took a nap\". Patient states he plans on discussing GLP-1  Therapy with his PCP. Unsure of what plan is for patient today or for discharge. Will follow along.

## 2020-06-11 NOTE — PROGRESS NOTES
06/10/20 2012   Dual Skin Pressure Injury Assessment   Second Care Provider (Based on Facility Policy) Sonja Grant RN    Skin Integumentary   Skin Integumentary (WDL) WDL   Skin Color Appropriate for ethnicity   Skin Condition/Temp Warm;Fragile   Skin Integrity Intact   Turgor Epidermis thin w/ loss of subcut tissue   Hair Growth Present   Varicosities Present    Pressure  Injury Documentation No Pressure Injury Noted-Pressure Ulcer Prevention Initiated   Wound Prevention and Protection Methods   Orientation of Wound Prevention Posterior   Location of Wound Prevention Sacrum/Coccyx   Dressing Present  Yes; Intact, not due to be changed   Dressing Status Intact   Wound Offloading (Prevention Methods) Bed, pressure reduction mattress;Pillows;Repositioning; Foam silicone

## 2020-06-11 NOTE — PROGRESS NOTES
Discharge teaching complete. Patient and wife verbalized understanding of diet, activity, s/sx as reviewed, and follow up appointment. Rx's and discharge instructions given to patient. IV's d/c'd with tips intact and dressings applied.

## 2020-06-11 NOTE — PROGRESS NOTES
Date of Outreach Update:  Dadagilda Delvalle was seen and assessed. MEWS Score: 3 (06/10/20 2321)  Vitals:    06/10/20 1845 06/10/20 2020 06/10/20 2321 06/11/20 0346   BP: 120/62 103/60 93/57 101/68   Pulse: 98 93 (!) 101 (!) 101   Resp: 18 18 18 20   Temp:  98.2 °F (36.8 °C) 98.9 °F (37.2 °C) 98.8 °F (37.1 °C)   SpO2: 92% 98% 91% 93%   Weight:             Pain Assessment  Pain Intensity 1: 6 (06/10/20 1622)  Pain Location 1: Leg  Pain Intervention(s) 1: Medication (see MAR)  Patient Stated Pain Goal: 0      Pt awakens to voice, denies any SOB or CP. RLE site c/d/i, soft to touch. Pulses palpable. SpO2 92% on RA . Previous Outreach assessment has been reviewed. There have been no significant clinical changes since the completion of the last dated Outreach assessment. Will continue to follow up per outreach protocol.     Signed By:   Radha Rocha    June 11, 2020 5:00 AM

## 2020-06-12 ENCOUNTER — PATIENT OUTREACH (OUTPATIENT)
Dept: CASE MANAGEMENT | Age: 65
End: 2020-06-12

## 2020-06-12 LAB
AT III AG PPP IA-ACNC: 74 % (ref 72–124)
PROT S AG ACT/NOR PPP IA: 89 % (ref 60–150)
PROT S FREE AG ACT/NOR PPP IA: 97 % (ref 57–157)

## 2020-06-12 NOTE — PROGRESS NOTES
LMSW approached by Dr Violet Caruso to assist with pt follow up for eliquis. Pt insurance did not cover eliquis, need prior auth. Called in a 30 day free card for eliquis to [facility] in [facility] that pt has been instructed to go to  this afternoon. Also spoke with Chantell Lagos at PCP office in follow up to update her that pt will have a mo supply of eliquis if PCP would please follow up about ins precert for ongoing treatment with either eliquis or xarelto as insurance will cover.

## 2020-06-12 NOTE — PROGRESS NOTES
Transition of Care Hospital Discharge Follow-Up      Date/Time:  2020 11:07 AM    Patient was admitted to [facility] on 6/10/2020 and discharged on 2020 for Bilateral PE. The physician discharge summary was available at the time of outreach. Patient was contacted within 7 business days of discharge. Inpatient RUR score: 5%  Was this a readmission? no   Patient stated reason for the readmission: none  Patients top risk factors for readmission: bilateral PE      LPN Care Coordinator contacted the family by telephone to perform post hospital discharge assessment. Verified name and  with family as identifiers. Provided introduction to self, and explanation of the Care Coordinator role. Patient received hospital discharge instructions. LPN reviewed discharge instructions and red flags with patient who verbalized understanding. Patient given an opportunity to ask questions and does not have any further questions or concerns at this time. The patient agrees to contact the PCP office for questions related to their healthcare. LPN provided contact information for future reference. Home Health orders at discharge: [address]: N/A  Date of initial or scheduled visit: N/A  (Assist with coordination of services if necessary.)    Durable Medical Equipment ordered at discharge: none  [address] [place] [address] received: N/A  (Assist patient in obtaining DME orders &/or equipment if necessary.)    Medication(s):   Medication review was performed with patient, who verbalizes understanding of administration of home medications. There were no barriers to obtaining medications identified at this time. Current Outpatient Medications   Medication Sig    acetaminophen (TYLENOL) 325 mg tablet Take 2 Tabs by mouth every six (6) hours as needed for Pain.  apixaban (ELIQUIS) 5 mg tablet Take 2 Tabs by mouth every twelve (12) hours for 6 days. Dose to be finished on 6/17/20    [START ON 6/18/2020] apixaban (ELIQUIS) 5 mg tablet Take 1 Tab by mouth two (2) times a day. START THIS DOSE REGIMEN ON 6/18/20    losartan (COZAAR) 50 mg tablet Take 1 Tab by mouth daily.  pantoprazole (PROTONIX) 40 mg tablet Take 1 Tab by mouth daily.  metFORMIN ER (GLUCOPHAGE XR) 500 mg tablet Take 4 Tabs by mouth daily (with dinner). (Patient taking differently: Take 1,500 mg by mouth daily (with dinner). )    empagliflozin (JARDIANCE) 25 mg tablet Take 1 Tab by mouth daily. (Patient taking differently: Take 50 mg by mouth daily.)    rosuvastatin (CRESTOR) 20 mg tablet Take 1 Tab by mouth daily.  albuterol (PROVENTIL HFA, VENTOLIN HFA, PROAIR HFA) 90 mcg/actuation inhaler Take 2 Puffs by inhalation every four (4) hours as needed.  aspirin 81 mg chewable tablet Take 1 Tab by mouth daily.  nitroglycerin (NITROSTAT) 0.4 mg SL tablet 1 Tab by SubLINGual route every five (5) minutes as needed for Chest Pain. No current facility-administered medications for this visit. There are no discontinued medications. ADL assessment:   (If patient is unable to perform ADLs  what is the limiting factor(s)? Do they have a support system that can assist? If no support system is present, discuss possible assistance that they may be able to obtain. Escalate for SW if ongoing issues are verbalized by pt or anticipated)    [facility] follow up appointment(s):   Future Appointments   Date Time Provider Lee Howard   6/30/2020  3:30 PM [facility]   7/14/2020  2:45 PM Jo Martinez MD [facility]   7/23/2020  8:00 AM [facility] IR UNIT 1 SFDRSP SFD      Non-[facility] follow up appointment(s):   7 Day follow up with PCP or Specialist:   Dr. Rio Miranda [facility] 6/30/2020  Transportation arranged: none    Covid Risk Education    Patient has following risk factors of: Bilateral PE.  Education provided regarding infection prevention, and signs and symptoms of COVID-19 and when to seek medical attention with family who verbalized understanding. Discussed exposure protocols and quarantine From CDC: Are you at higher risk for severe illness?  and given an opportunity for questions and concerns. The family agrees to contact the COVID-19 hotline [phone] or PCP office for questions related to COVID-19.      For more information on steps you can take to protect yourself, see CDC's How to Protect Yourself     Patient/family/caregiver given information for [place] and agrees to enroll no  Patient's preferred e-mail: declines  Patient's preferred phone number: declines      Any other questions or concerns expressed by patient?none    Scheduled next follow up call or referral to CTN/ ACM:  Next follow up call:within 14 days    Goals Addressed                 This Visit's Progress     Takes Medications as prescribed   On track

## 2020-06-16 LAB
CARDIOLIPIN IGA SER IA-ACNC: <9 APL U/ML (ref 0–11)
CARDIOLIPIN IGG SER IA-ACNC: <9 GPL U/ML (ref 0–14)
CARDIOLIPIN IGM SER IA-ACNC: <9 MPL U/ML (ref 0–12)
PE AB, IGA, 823219: 7.5 U/ML
PE AB, IGG, 823220: 2.4 U/ML
PE AB, IGM, 823221: 6.9 U/ML
PHOSPHATIDYLGLYCEROL IGA, 823222: 4.2 U/ML
PHOSPHATIDYLGLYCEROL IGG, 823223: 4.3 U/ML
PHOSPHATIDYLGLYCEROL IGM, 823224: 1.9 U/ML
PHOSPHATIDYLINOSITOL IGA, 823216: 3.6 U/ML
PHOSPHATIDYLINOSITOL IGG, 823217: 3.1 U/ML
PHOSPHATIDYLINOSITOL IGM, 823218: 2.8 U/ML
PS IGA SER-ACNC: 2 APS IGA (ref 0–20)
PS IGG SER-ACNC: 2 GPS IGG (ref 0–11)
PS IGM SER-ACNC: 5 MPS IGM (ref 0–25)

## 2020-06-16 NOTE — PHYSICIAN ADVISORY
Letter of Status Determination: Current Status Utilization Review Summary Created for Dr. Antionette Canavan Pt Name:  Guerline Delvalle MR#  [id number] [facility]#   [id number] Room and KELLEN JONES [facility]  712/01  @ [facility] Hospitalization date  6/10/2020  1:36 AM  
Current Attending Physician  No att. providers found Principal diagnosis  Bilateral pulmonary embolism ([address] Clinicals  77 y.o. y.o  male hospitalized with above diagnosis. He was treated with IV heparin drip and next day he went through the following procedure Diagnostic venography and interventions \"Left lower extremity venography reveals occlusive clot throughout the left 
femoral vein. Successful mechanical thrombectomy with the Inari Clot-Triever. May Thurner lesion is suspected. We will plan for follow-up venogram in 
approximately 6 weeks to reevaluate. \" He was placed on full anticoagulation and discharged after he returned to the unit in stable condition . [facility] criteria Does  apply MCG M 350 (24th edition) Clinical Indications for Admission to Inpatient Care Admission is indicated for 1 or more of the following: Thrombolysis (eg, catheter-directed) or pharmacomechanical thrombectomy needed STATUS DETERMINATION  On the basis of clinical data, available documentaion, we believe that the current status of this patient as INPATIENT was Appropriate Additional comments Insurance  Payor: UNITED HE[employer]CARE / Plan: [employer] HMO/CHOICE PLUS/POS / Product Type: HMO /   
[employer] [facility] HMO/CHOICE PLUS/POS Phone: [phone] Subscriber: Kaylynn Quigley Subscriber#: [id number] Group#: [id number] Precert#:   
   
 [place] [address] MEDICARE PART A ONLY Phone:   
 Subscriber: Hero Hudson Subscriber#: [id number]  Group#:  Precert#:   
  
 
 
The information in this document is a recommendation to be used for utilization review and utilization management purposes only. This recommendation is not an order. The recommendation is made based on the information reviewed at the time of the referral, is pursuant to the [facility] Conditions of Participation (42 CFR Part 482), and is neither a judgment nor an assessment with regard to the appropriateness or quality of clinical care. For all Managed Care patients: The Criteria are intended solely for use as screening guidelines with respect to the medical appropriateness of healthcare services and not for final clinical or payment determinations concerning the type or level of medical care provided, or proposed to be provided, to a patient. They help the reviewers determine whether a patient is appropriate for observation or inpatient admission at the time a decision to admit the patient is being made. All efforts are made to apply the pertinent payor criteria (MCG or InterQual) as well as the clinical judgements based on the information reviewed at the time of the referral.\" Nothing in this document may be used to limit, alter, or affect clinical services provided to the patient named below. Douglas Fierro MD MPH FACP Physician Advisor [address]  [phone]

## 2020-06-17 LAB
F2 GENE MUT ANL BLD/T: NORMAL
F5 GENE MUT ANL BLD/T: NORMAL

## 2020-06-26 ENCOUNTER — PATIENT OUTREACH (OUTPATIENT)
Dept: CASE MANAGEMENT | Age: 65
End: 2020-06-26

## 2020-06-26 NOTE — PROGRESS NOTES
Transition of Care Hospital Discharge Follow-Up      Date/Time:  2020 10:01 AM    LPN Care Coordinator contacted the patient by telephone to perform post hospital follow up. Verified name and  with patient as identifiers. LPN reinforced discharge instructions and red flags with patient who verbalized understanding. Patient given an opportunity to ask questions and does not have any further questions or concerns at this time. The patient agrees to contact the PCP office for questions related to their healthcare    [facility] follow up appointment(s):   Future Appointments   Date Time Provider Lee Howard   2020  3:45 PM Horace Heaton MD [facility] FIM FIM   2020  3:30 PM Ren Acharya MD [facility] UCDE UCD   2020  2:45 PM Ren Acharya MD [facility] UCDG UCD   2020  8:00 AM SFD IR UNIT 1 SFDRSP SFD      Non-[facility] follow up appointment(s):   Patient attended follow up appointments since last contact: Patient states attended a f/u appointment with Dr. Marie Arambula on 6/15/2020  What was the outcome of the appointment: Patient states good outcome    [facility] Active: [address]: N/A  Any issues/ progress:  (Assist with coordination of services if necessary.)      Medication(s):   Medication changes since discharge:     Current Outpatient Medications   Medication Sig    metFORMIN (GLUCOPHAGE) 500 mg tablet Take 2 Tabs by mouth two (2) times daily (with meals).  pantoprazole (PROTONIX) 40 mg tablet Take 1 Tab by mouth daily.  empagliflozin (JARDIANCE) 25 mg tablet Take 1 Tab by mouth daily.  acetaminophen (TYLENOL) 325 mg tablet Take 2 Tabs by mouth every six (6) hours as needed for Pain.  losartan (COZAAR) 50 mg tablet Take 1 Tab by mouth daily.  rosuvastatin (CRESTOR) 20 mg tablet Take 1 Tab by mouth daily.  albuterol (PROVENTIL HFA, VENTOLIN HFA, PROAIR HFA) 90 mcg/actuation inhaler Take 2 Puffs by inhalation every four (4) hours as needed.     aspirin 81 mg chewable tablet Take 1 Tab by mouth daily.  nitroglycerin (NITROSTAT) 0.4 mg SL tablet 1 Tab by SubLINGual route every five (5) minutes as needed for Chest Pain. No current facility-administered medications for this visit. Other Issues/ Miscellaneous? (Transportation, access to meals, ability to perform ADLs, adequate caregiver support, etc.)  Referrals needed? (SW, Diabetes education, HH, etc.)    Any other questions or concerns expressed by patient? Patient voiced no concerns    Schedule next appointment with FLAKITO or referral to CTN/ ACM:  Graduation:Yes  Next Outreach Scheduled:     Goals      Takes Medications as prescribed

## 2020-07-23 ENCOUNTER — HOSPITAL ENCOUNTER (OUTPATIENT)
Dept: INTERVENTIONAL RADIOLOGY/VASCULAR | Age: 65
Discharge: HOME | End: 2020-07-23
Attending: RADIOLOGY
Payer: COMMERCIAL

## 2020-07-23 VITALS
TEMPERATURE: 98 F | OXYGEN SATURATION: 95 % | DIASTOLIC BLOOD PRESSURE: 59 MMHG | SYSTOLIC BLOOD PRESSURE: 109 MMHG | HEART RATE: 66 BPM | WEIGHT: 211 LBS | RESPIRATION RATE: 16 BRPM | BODY MASS INDEX: 27.96 KG/M2 | HEIGHT: 73 IN

## 2020-07-23 LAB
ANION GAP SERPL CALC-SCNC: 6 MMOL/L (ref 7–16)
BASOPHILS # BLD: 0 K/UL (ref 0–0.2)
BASOPHILS NFR BLD: 1 % (ref 0–2)
BUN SERPL-MCNC: 18 MG/DL (ref 8–23)
CALCIUM SERPL-MCNC: 8.2 MG/DL (ref 8.3–10.4)
CHLORIDE SERPL-SCNC: 114 MMOL/L (ref 98–107)
CO2 SERPL-SCNC: 22 MMOL/L (ref 21–32)
CREAT SERPL-MCNC: 0.87 MG/DL (ref 0.8–1.5)
DIFFERENTIAL METHOD BLD: NORMAL
EOSINOPHIL # BLD: 0.1 K/UL (ref 0–0.8)
EOSINOPHIL NFR BLD: 2 % (ref 0.5–7.8)
ERYTHROCYTE [DISTWIDTH] IN BLOOD BY AUTOMATED COUNT: 14.2 % (ref 11.9–14.6)
GLUCOSE BLD STRIP.AUTO-MCNC: 158 MG/DL (ref 65–100)
GLUCOSE SERPL-MCNC: 143 MG/DL (ref 65–100)
HCT VFR BLD AUTO: 47.3 % (ref 41.1–50.3)
HGB BLD-MCNC: 15.6 G/DL (ref 13.6–17.2)
IMM GRANULOCYTES # BLD AUTO: 0 K/UL (ref 0–0.5)
IMM GRANULOCYTES NFR BLD AUTO: 1 % (ref 0–5)
LYMPHOCYTES # BLD: 2.3 K/UL (ref 0.5–4.6)
LYMPHOCYTES NFR BLD: 39 % (ref 13–44)
MCH RBC QN AUTO: 30.4 PG (ref 26.1–32.9)
MCHC RBC AUTO-ENTMCNC: 33 G/DL (ref 31.4–35)
MCV RBC AUTO: 92.2 FL (ref 79.6–97.8)
MONOCYTES # BLD: 0.5 K/UL (ref 0.1–1.3)
MONOCYTES NFR BLD: 8 % (ref 4–12)
NEUTS SEG # BLD: 2.8 K/UL (ref 1.7–8.2)
NEUTS SEG NFR BLD: 50 % (ref 43–78)
NRBC # BLD: 0 K/UL (ref 0–0.2)
PLATELET # BLD AUTO: 171 K/UL (ref 150–450)
PMV BLD AUTO: 10.8 FL (ref 9.4–12.3)
POTASSIUM SERPL-SCNC: 5.5 MMOL/L (ref 3.5–5.1)
RBC # BLD AUTO: 5.13 M/UL (ref 4.23–5.6)
SODIUM SERPL-SCNC: 142 MMOL/L (ref 136–145)
WBC # BLD AUTO: 5.8 K/UL (ref 4.3–11.1)

## 2020-07-23 PROCEDURE — 74011250636 HC RX REV CODE- 250/636: Performed by: RADIOLOGY

## 2020-07-23 PROCEDURE — 80048 BASIC METABOLIC PNL TOTAL CA: CPT

## 2020-07-23 PROCEDURE — C1769 GUIDE WIRE: HCPCS

## 2020-07-23 PROCEDURE — C1894 INTRO/SHEATH, NON-LASER: HCPCS

## 2020-07-23 PROCEDURE — 37212 THROMBOLYTIC VENOUS THERAPY: CPT

## 2020-07-23 PROCEDURE — C1725 CATH, TRANSLUMIN NON-LASER: HCPCS

## 2020-07-23 PROCEDURE — 85025 COMPLETE CBC W/AUTO DIFF WBC: CPT

## 2020-07-23 PROCEDURE — 74011000250 HC RX REV CODE- 250: Performed by: RADIOLOGY

## 2020-07-23 PROCEDURE — 77030003629 HC NDL PERC VASC COOK -A

## 2020-07-23 PROCEDURE — 82962 GLUCOSE BLOOD TEST: CPT

## 2020-07-23 PROCEDURE — 77030013519 HC DEV INFL BASIX MRTM -B

## 2020-07-23 PROCEDURE — 77030021532 HC CATH ANGI DX IMPRS MRTM -B

## 2020-07-23 PROCEDURE — C1753 CATH, INTRAVAS ULTRASOUND: HCPCS

## 2020-07-23 PROCEDURE — 36005 INJECTION EXT VENOGRAPHY: CPT

## 2020-07-23 PROCEDURE — 74011636320 HC RX REV CODE- 636/320: Performed by: RADIOLOGY

## 2020-07-23 RX ORDER — HEPARIN SODIUM 200 [USP'U]/100ML
1000 INJECTION, SOLUTION INTRAVENOUS AS NEEDED
Status: DISCONTINUED | OUTPATIENT
Start: 2020-07-23 | End: 2020-07-27 | Stop reason: HOSPADM

## 2020-07-23 RX ORDER — MIDAZOLAM HYDROCHLORIDE 1 MG/ML
.5-2 INJECTION, SOLUTION INTRAMUSCULAR; INTRAVENOUS
Status: DISCONTINUED | OUTPATIENT
Start: 2020-07-23 | End: 2020-07-27 | Stop reason: HOSPADM

## 2020-07-23 RX ORDER — SODIUM CHLORIDE 9 MG/ML
25 INJECTION, SOLUTION INTRAVENOUS ONCE
Status: COMPLETED | OUTPATIENT
Start: 2020-07-23 | End: 2020-07-23

## 2020-07-23 RX ORDER — FENTANYL CITRATE 50 UG/ML
25-50 INJECTION, SOLUTION INTRAMUSCULAR; INTRAVENOUS
Status: DISCONTINUED | OUTPATIENT
Start: 2020-07-23 | End: 2020-07-27 | Stop reason: HOSPADM

## 2020-07-23 RX ORDER — LIDOCAINE HYDROCHLORIDE 20 MG/ML
0.2 INJECTION, SOLUTION INFILTRATION; PERINEURAL ONCE
Status: COMPLETED | OUTPATIENT
Start: 2020-07-23 | End: 2020-07-23

## 2020-07-23 RX ORDER — HEPARIN SODIUM 200 [USP'U]/100ML
4000 INJECTION, SOLUTION INTRAVENOUS
Status: DISCONTINUED | OUTPATIENT
Start: 2020-07-23 | End: 2020-07-27 | Stop reason: HOSPADM

## 2020-07-23 RX ADMIN — ALTEPLASE 8 MG: 2.2 INJECTION, POWDER, LYOPHILIZED, FOR SOLUTION INTRAVENOUS at 09:41

## 2020-07-23 RX ADMIN — HEPARIN SODIUM 2000 UNITS: 200 INJECTION, SOLUTION INTRAVENOUS at 08:46

## 2020-07-23 RX ADMIN — SODIUM CHLORIDE 25 ML/HR: 900 INJECTION, SOLUTION INTRAVENOUS at 08:25

## 2020-07-23 RX ADMIN — FENTANYL CITRATE 50 MCG: 50 INJECTION, SOLUTION INTRAMUSCULAR; INTRAVENOUS at 08:32

## 2020-07-23 RX ADMIN — HEPARIN SODIUM 2000 UNITS: 200 INJECTION, SOLUTION INTRAVENOUS at 08:45

## 2020-07-23 RX ADMIN — LIDOCAINE HYDROCHLORIDE 4 MG: 20 INJECTION, SOLUTION INFILTRATION; PERINEURAL at 08:44

## 2020-07-23 RX ADMIN — FENTANYL CITRATE 50 MCG: 50 INJECTION, SOLUTION INTRAMUSCULAR; INTRAVENOUS at 08:39

## 2020-07-23 RX ADMIN — IOPAMIDOL 38 ML: 612 INJECTION, SOLUTION INTRAVENOUS at 09:48

## 2020-07-23 RX ADMIN — MIDAZOLAM 1 MG: 1 INJECTION INTRAMUSCULAR; INTRAVENOUS at 08:33

## 2020-07-23 RX ADMIN — LIDOCAINE HYDROCHLORIDE 4 MG: 20 INJECTION, SOLUTION INFILTRATION; PERINEURAL at 09:34

## 2020-07-23 RX ADMIN — HEPARIN SODIUM 2000 UNITS: 200 INJECTION, SOLUTION INTRAVENOUS at 08:48

## 2020-07-23 RX ADMIN — MIDAZOLAM 1 MG: 1 INJECTION INTRAMUSCULAR; INTRAVENOUS at 08:39

## 2020-07-23 RX ADMIN — HEPARIN SODIUM 2000 UNITS: 200 INJECTION, SOLUTION INTRAVENOUS at 08:47

## 2020-07-23 NOTE — DISCHARGE INSTRUCTIONS
[address]  Department of Interventional Radiology  [facility]  [phone] Office  [phone] Fax       Venogram Discharge Instructions    General Information:   A venogram is a procedure that allows the interventional radiologist to take pictures of the blood flow in the vascular system. A radiology contrast (or dye) is injected into the veins so that the condition of the veins and valves may be visualized. This procedure can be used to diagnose narrowing of the veins or the presence of a blood clot in the deep veins of the body. During this process, a stent, filter or a special intravenous line could be placed. Home Care Instructions: You can resume your regular diet. Do not shower, bathe, swim, drink alcohol, or make any important legal decisions in the next 48 hours. Do not lift anything heavier than a gallon of milk for 5 days. If you take Glucophage (metformin) for diabetes, do not take it for the next 48 hours. If you were asked to hold any blood thinners prior to the procedure, you may restart that medicine the day after the procedure is completed. Recline your car seat for the ride home. Call If:   You should call your Physician and/or the Radiology Nurse if you have any signs of infection; fever, drainage, redness, and/or swelling. If the puncture site should ooze, please call. Also call if you have any pain, decreased sensation, numbness, tingling, swelling, or change in color to the affected extremity. SEEK IMMEDIATE MEDICAL CARE IF YOUR PUNCTURE SITE STARTS TO BLEED. APPLY ENOUGH FIRM PRESSURE TO THE SITE WITH THE TIPS OF YOUR FINGERS TO STOP THE BLEEDING. Follow-Up Instructions:  Please see your ordering doctor as he/she has requested. To Reach Us: If you have any questions about your procedure, please call the Interventional Radiology department at [phone].  After business hours (5pm) and weekends, call the answering service at [phone] and ask for the Radiologist on call to be paged. Si tiene Preguntas acerca del procedimiento, por favor llame al departamento de Radiología Intervencional al [phone]. Después de horas de oficina (5 pm) y los fines de [place], llamar al [place] al [phone] y pregunte por el Radiologo de [facility]. Interventional Radiology General Nurse Discharge    After general anesthesia or intravenous sedation, for 24 hours or while taking prescription Narcotics:  · Limit your activities  · Do not drive and operate hazardous machinery  · Do not make important personal or business decisions  · Do  not drink alcoholic beverages  · If you have not urinated within 8 hours after discharge, please contact your surgeon on call. * Please give a list of your current medications to your Primary Care Provider. * Please update this list whenever your medications are discontinued, doses are     changed, or new medications (including over-the-counter products) are added. * Please carry medication information at all times in case of emergency situations. These are general instructions for a healthy lifestyle:    No smoking/ No tobacco products/ Avoid exposure to second hand smoke  Surgeon General's Warning:  Quitting smoking now greatly reduces serious risk to your health. Obesity, smoking, and sedentary lifestyle greatly increases your risk for illness  A healthy diet, regular physical exercise & weight monitoring are important for maintaining a healthy lifestyle    You may be retaining fluid if you have a history of heart failure or if you experience any of the following symptoms:  Weight gain of 3 pounds or more overnight or 5 pounds in a week, increased swelling in our hands or feet or shortness of breath while lying flat in bed. Please call your doctor as soon as you notice any of these symptoms; do not wait until your next office visit.     Recognize signs and symptoms of STROKE:  F-face looks uneven    A-arms unable to move or move unevenly    S-speech slurred or non-existent    T-time-call 911 as soon as signs and symptoms begin-DO NOT go       Back to bed or wait to see if you get better-TIME IS BRAIN.     Patient Signature:  Date: 7/23/2020  Discharging Nurse: Megan Germain RN

## 2020-07-23 NOTE — PROCEDURES
Department of Interventional Radiology  [phone]        Interventional Radiology Brief Procedure Note    Patient: Tracey Cruz MRN: [id number]  SSN: [id number]    YOB: 1955  Age: 77 y.o. Sex: male      Date of Procedure: 7/23/2020    Pre-Procedure Diagnosis: LLE DVT with persistent LLE swelling    Post-Procedure Diagnosis: SAME    Procedure(s): Venogram    Brief Description of Procedure: LLE and pelvic venography reveals 50% stenosis in the proximal left external iliac vein, successfully treated with angioplasty to 12 mm. IVUS negative for May-Thurner. Persistent occlusive thrombus in the left FV and popliteal vein. This was laced with 8 mg of TPA to promote thrombolysis and re-canalization. Performed By: Shikha Paul MD     Assistants: None    Anesthesia:Moderate Sedation    Estimated Blood Loss: Less than 10ml    Specimens:  None    Implants:  None    Findings: As above    Complications: None    Follow Up:  Follow up in IR clinic in 1-2 months    Signed By: Shikha Paul MD     July 23, 2020

## 2020-07-23 NOTE — H&P
Department of Interventional Radiology  [phone]    History and Physical    Patient:  Kenyon Padilla MRN:  [id number]  SSN:  [id number]    YOB: 1955  Age:  77 y.o. Sex:  male      Primary Care Provider:  Traci Giordano MD  Referring Physician:  Kenyatta Matson MD    Subjective:     Chief Complaint: DVT    History of the Present Illness: The patient is a 77 y.o. male with history of LLE DVT, PE who presents for venogram, [facility]. S/p thrombectomy 5/2020 with good result. Pt is taking Eliquis which he has held. He reports mild LLE swelling below the knee, particularly later in the day. He sometimes wears knee high compression socks. NO respiratory complaints, nearly back to baseline. NPO. 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 LVEDP=21. LM:nl. LAD:patent stents. DX 1&Dx 2 are patent. LCX:patent stents. RCA:20-30% mid stenosis. Slow filling c/w endothelial dysfunction described.  HX HERNIA REPAIR      HX UROLOGICAL      kidney stone removal    IR THROMBOLYSIS TRANSCATH NON CORONARY OR INTRACRANIAL  6/10/2020        Review of Systems:    Pertinent items are noted in the History of Present Illness. Prior to Admission medications    Medication Sig Start Date End Date Taking?  Authorizing Provider   metFORMIN (GLUCOPHAGE) 500 mg tablet Take 2 Tabs by mouth two (2) times daily (with meals). Patient taking differently: Take 1,000 mg by mouth two (2) times daily (with meals). Pt. Is taking 3 tablets once daily 20  Yes Shahana Keen MD   pantoprazole (PROTONIX) 40 mg tablet Take 1 Tab by mouth daily. 6/15/20  Yes Shahana Keen MD   empagliflozin (JARDIANCE) 25 mg tablet Take 1 Tab by mouth daily. 6/15/20  Yes Shahana Keen MD   acetaminophen (TYLENOL) 325 mg tablet Take 2 Tabs by mouth every six (6) hours as needed for Pain. 20  Yes Matthew Medellin MD   losartan (COZAAR) 50 mg tablet Take 1 Tab by mouth daily. 20  Yes Mignon Klinefelter, MD   albuterol (PROVENTIL HFA, VENTOLIN HFA, PROAIR HFA) 90 mcg/actuation inhaler Take 2 Puffs by inhalation every four (4) hours as needed. 18  Yes Provider, Historical   aspirin 81 mg chewable tablet Take 1 Tab by mouth daily. 18  Yes Mignon Klinefelter, MD   apixaban (Eliquis) 5 mg tablet Take 1 Tab by mouth two (2) times a day. 20   Shahana Keen MD   nitroglycerin (NITROSTAT) 0.4 mg SL tablet 1 Tab by SubLINGual route every five (5) minutes as needed for Chest Pain.  18   Mignon Klinefelter, MD        Allergies   Allergen Reactions    Brilinta [Ticagrelor] Other (comments)     Chest pain       Family History   Problem Relation Age of Onset    Cancer Mother         Liver Cancer     Social History     Tobacco Use    Smoking status: Former Smoker     Packs/day: 0.50     Years: 30.00     Pack years: 15.00     Types: Cigarettes     Last attempt to quit:      Years since quittin.5    Smokeless tobacco: Former User   Substance Use Topics    Alcohol use: No        Objective:       Physical Examination:    Vitals:    20 0715 20 0718   BP: 108/67    Pulse: 61    Resp: 18    Temp: 98 °F (36.7 °C)    SpO2: 95%    Weight:  95.7 kg (211 lb)   Height:  6' 1\" (1.854 m)       Pain Assessment  Pain Intensity 1: 0 (20 0718)        Patient Stated Pain Goal: 0      HEART: regular rate and rhythm  LUNG: clear to auscultation bilaterally  ABDOMEN: normal findings: soft, non-tender  EXTREMITIES: warm, 1+ LLE pretibial edema    Laboratory:     Lab Results   Component Value Date/Time    Sodium 140 06/10/2020 03:11 AM    Sodium 138 06/09/2020 08:19 PM    Potassium 3.6 06/10/2020 03:11 AM    Potassium 3.9 06/09/2020 08:19 PM    Chloride 111 (H) 06/10/2020 03:11 AM    Chloride 105 06/09/2020 08:19 PM    CO2 24 06/10/2020 03:11 AM    CO2 25 06/09/2020 08:19 PM    Anion gap 5 (L) 06/10/2020 03:11 AM    Anion gap 8 06/09/2020 08:19 PM    Glucose 220 (H) 06/10/2020 03:11 AM    Glucose 225 (H) 06/09/2020 08:19 PM    BUN 12 06/10/2020 03:11 AM    BUN 13 06/09/2020 08:19 PM    Creatinine 0.83 06/10/2020 03:11 AM    Creatinine 0.97 06/09/2020 08:19 PM    GFR est AA >60 06/10/2020 03:11 AM    GFR est AA >60 06/09/2020 08:19 PM    GFR est non-AA >60 06/10/2020 03:11 AM    GFR est non-AA >60 06/09/2020 08:19 PM    Calcium 7.8 (L) 06/10/2020 03:11 AM    Calcium 8.5 06/09/2020 08:19 PM    Magnesium 2.3 02/13/2019 09:31 AM    Magnesium 2.0 09/07/2017 02:25 PM    Albumin 3.8 06/09/2020 08:19 PM    Albumin 4.1 02/13/2019 09:31 AM    Protein, total 6.9 06/09/2020 08:19 PM    Protein, total 7.2 02/13/2019 09:31 AM    Globulin 3.1 06/09/2020 08:19 PM    Globulin 3.1 02/13/2019 09:31 AM    A-G Ratio 1.2 06/09/2020 08:19 PM    A-G Ratio 1.3 02/13/2019 09:31 AM    ALT (SGPT) 25 06/09/2020 08:19 PM    ALT (SGPT) 25 02/13/2019 09:31 AM     Lab Results   Component Value Date/Time    WBC 7.6 06/10/2020 03:11 AM    WBC 7.5 06/09/2020 08:19 PM    HGB 16.2 06/10/2020 03:11 AM    HGB 17.3 (H) 06/09/2020 08:19 PM    HCT 47.5 06/10/2020 03:11 AM    HCT 51.4 (H) 06/09/2020 08:19 PM    PLATELET 902 38/84/8789 03:11 AM    PLATELET 882 40/88/3377 08:19 PM     Lab Results   Component Value Date/Time    aPTT 27.1 06/10/2020 07:31 PM    aPTT 87.0 (H) 06/10/2020 10:55 AM    Prothrombin time 14.0 06/09/2020 08:19 PM    Prothrombin time 13.0 02/18/2019 06:44 AM    INR 1.0 06/09/2020 08:19 PM    INR 1.0 02/18/2019 06:44 AM       Assessment:     LLE DVT treated with Thrombectomy 5/2020,  May Thurner, [facility] Problems  Date Reviewed: 6/11/2019    None          Plan:     Planned Procedure:  Venogram, possible intervention    Risks, benefits, and alternatives reviewed with patient and he agrees to proceed with the procedure.       Signed By: Norberto Lewis PA-C     July 23, 2020

## 2020-07-23 NOTE — PROGRESS NOTES
TRANSFER - OUT REPORT:    Verbal report given to Charlie Archer on [employer]  being transferred to IR room #5 for routine progression of care       Report consisted of patients Situation, Background, Assessment and   Recommendations(SBAR). Information from the following report(s) SBAR, Kardex, Procedure Summary and MAR was reviewed with the receiving nurse. Lines:   Peripheral IV 07/23/20 Left;Posterior Hand (Active)        Opportunity for questions and clarification was provided.       Patient transported with:   Registered Nurse

## 2021-04-16 PROBLEM — I26.99 BILATERAL PULMONARY EMBOLISM (HCC): Status: RESOLVED | Noted: 2020-06-09 | Resolved: 2021-04-16

## 2022-03-18 PROBLEM — Z98.890 S/P BUNIONECTOMY: Status: ACTIVE | Noted: 2018-11-14

## 2022-03-18 PROBLEM — R09.02 HYPOXIA: Status: ACTIVE | Noted: 2020-06-11

## 2022-03-18 PROBLEM — I82.402 ACUTE DEEP VEIN THROMBOSIS (DVT) OF LEFT LOWER EXTREMITY (HCC): Status: ACTIVE | Noted: 2020-06-10

## 2022-03-19 PROBLEM — I25.10 CAD (CORONARY ARTERY DISEASE): Status: ACTIVE | Noted: 2018-04-24

## 2022-03-19 PROBLEM — M24.472: Status: ACTIVE | Noted: 2018-08-30

## 2022-03-19 PROBLEM — E11.9 DM TYPE 2 (DIABETES MELLITUS, TYPE 2) (HCC): Status: ACTIVE | Noted: 2020-06-09

## 2022-03-21 PROBLEM — I82.402 ACUTE DEEP VEIN THROMBOSIS (DVT) OF LEFT LOWER EXTREMITY (HCC): Status: RESOLVED | Noted: 2020-06-10 | Resolved: 2022-03-21

## 2022-03-24 PROBLEM — I82.402 ACUTE DEEP VEIN THROMBOSIS (DVT) OF LEFT LOWER EXTREMITY (HCC): Status: RESOLVED | Noted: 2020-06-10 | Resolved: 2022-03-21

## 2022-06-07 ENCOUNTER — OFFICE VISIT (OUTPATIENT)
Dept: CARDIOLOGY CLINIC | Age: 67
End: 2022-06-07
Payer: COMMERCIAL

## 2022-06-07 VITALS
HEIGHT: 73 IN | DIASTOLIC BLOOD PRESSURE: 70 MMHG | WEIGHT: 222 LBS | BODY MASS INDEX: 29.42 KG/M2 | SYSTOLIC BLOOD PRESSURE: 110 MMHG | HEART RATE: 76 BPM

## 2022-06-07 DIAGNOSIS — I10 ESSENTIAL HYPERTENSION: ICD-10-CM

## 2022-06-07 DIAGNOSIS — E11.9 TYPE 2 DIABETES MELLITUS WITHOUT COMPLICATION, WITHOUT LONG-TERM CURRENT USE OF INSULIN (HCC): ICD-10-CM

## 2022-06-07 DIAGNOSIS — E78.5 DYSLIPIDEMIA: ICD-10-CM

## 2022-06-07 DIAGNOSIS — I25.118 CORONARY ARTERY DISEASE OF NATIVE ARTERY OF NATIVE HEART WITH STABLE ANGINA PECTORIS (HCC): Primary | ICD-10-CM

## 2022-06-07 PROCEDURE — 3017F COLORECTAL CA SCREEN DOC REV: CPT | Performed by: INTERNAL MEDICINE

## 2022-06-07 PROCEDURE — 1123F ACP DISCUSS/DSCN MKR DOCD: CPT | Performed by: INTERNAL MEDICINE

## 2022-06-07 PROCEDURE — 2022F DILAT RTA XM EVC RTNOPTHY: CPT | Performed by: INTERNAL MEDICINE

## 2022-06-07 PROCEDURE — 3046F HEMOGLOBIN A1C LEVEL >9.0%: CPT | Performed by: INTERNAL MEDICINE

## 2022-06-07 PROCEDURE — G8427 DOCREV CUR MEDS BY ELIG CLIN: HCPCS | Performed by: INTERNAL MEDICINE

## 2022-06-07 PROCEDURE — 99214 OFFICE O/P EST MOD 30 MIN: CPT | Performed by: INTERNAL MEDICINE

## 2022-06-07 PROCEDURE — 4004F PT TOBACCO SCREEN RCVD TLK: CPT | Performed by: INTERNAL MEDICINE

## 2022-06-07 PROCEDURE — G8417 CALC BMI ABV UP PARAM F/U: HCPCS | Performed by: INTERNAL MEDICINE

## 2022-06-07 NOTE — PROGRESS NOTES
[address], [address] [place] [address], [address] [place] [address]  PHONE: [phone]    SUBJECTIVE: Ariane Cha  Modesta Hernandez (1955) is a 69 y.o. male seen for a follow up visit regarding the following:   Specialty Problems        Cardiology Problems    Mixed hyperlipidemia        CAD (coronary artery disease)            Cath 2/2019  CONCLUSION:  Minimal nonobstructive coronary artery disease with preserved left  ventricular systolic function. Continued patency of the previously placed LAD and  circumflex stents. No culprit lesion is identified. The patient's symptoms could be  potentially caused by his elevated LVEDP or endothelial dysfunction. Continue  medical therapy.       Had prior DVT in leg and is on chronic supressive therapy. 6/7/22  Pt doing well. No chest pain. No palpitations. Patient denies syncope. No dyspnea. States they are taking meds. Maintains a normal level of activity for them without symptoms. No dizziness or lightheadedness. All above conditions stable. Past Medical History, Past Surgical History, Family history, Social History, and Medications were all reviewed with the patient today and updated as necessary. Allergies   Allergen Reactions    Ticagrelor Other (See Comments)     Chest pain     Past Medical History:   Diagnosis Date    Acute prostatitis 3/11/2015    Bilateral pulmonary embolism (HCC)     CAD (coronary artery disease)     Calculus of kidney     Diabetes ([address]     DVT of lower limb, acute ([address] 6/9/2020    Esophageal reflux 3/11/2015    GERD (gastroesophageal reflux disease)     Hallux valgus (acquired)     Hematuria, unspecified     Impotence of organic origin 3/11/2015    Other and unspecified hyperlipidemia     Primary localized osteoarthrosis, unspecified site      Past Surgical History:   Procedure Laterality Date    CARDIAC CATHETERIZATION  02/18/2019    LV:EF=65%. LVEDP=21. LM:nl. LAD:patent stents. DX 1&Dx 2 are patent. LCX:patent stents. RCA:20-30% mid stenosis. Slow filling c/w endothelial dysfunction described.     CHOLECYSTECTOMY      HERNIA REPAIR      IR TRANSCATH INFUSION THROMB NONCORONARY  6/10/2020    [place] CARDIAC SURG PROCEDURE UNLIST      UROLOGICAL SURGERY      kidney stone removal     Family History   Problem Relation Age of Onset    Cancer Mother         Liver Cancer      Social History     Tobacco Use    Smoking status: Former Smoker     Packs/day: 0.50     Quit date: 1993     Years since quittin.4    Smokeless tobacco: Former User   Substance Use Topics    Alcohol use: No       Current Outpatient Medications:     metFORMIN (GLUCOPHAGE) 500 MG tablet, Take 500 mg by mouth 2 times daily (with meals), Disp: , Rfl:     apixaban (ELIQUIS) 2.5 MG TABS tablet, Take 1 tablet by mouth 2 times daily, Disp: 180 tablet, Rfl: 3    acetaminophen (TYLENOL) 325 MG tablet, Take 650 mg by mouth every 6 hours as needed, Disp: , Rfl:     albuterol sulfate  (90 Base) MCG/ACT inhaler, Inhale 2 puffs into the lungs every 4 hours as needed, Disp: , Rfl:     aspirin 81 MG chewable tablet, Take 81 mg by mouth daily, Disp: , Rfl:     empagliflozin (JARDIANCE) 25 MG tablet, Take 25 mg by mouth daily, Disp: , Rfl:     losartan (COZAAR) 50 MG tablet, Take 50 mg by mouth daily, Disp: , Rfl:     nitroGLYCERIN (NITROSTAT) 0.4 MG SL tablet, Place 0.4 mg under the tongue, Disp: , Rfl:     pantoprazole (PROTONIX) 40 MG tablet, Take 40 mg by mouth daily, Disp: , Rfl:     rosuvastatin (CRESTOR) 20 MG tablet, Take 20 mg by mouth, Disp: , Rfl:     Semaglutide,0.25 or 0.5MG/DOS, 2 MG/1.5ML SOPN, Inject into the skin every 7 days, Disp: , Rfl:     SITagliptin (JANUVIA) 100 MG tablet, Take 100 mg by mouth daily, Disp: , Rfl:     ROS:  Review of Systems - General ROS: negative for - chills, fatigue or fever  Hematological and Lymphatic ROS: negative for - bleeding problems, bruising or jaundice  Respiratory ROS: no cough, shortness of breath, or wheezing  Cardiovascular ROS: no chest pain or dyspnea on exertion  Gastrointestinal ROS: no abdominal pain, change in bowel habits, or black or bloody stools  Neurological ROS: no TIA or stroke symptoms  All other systems negative. Wt Readings from Last 3 Encounters:   06/07/22 222 lb (100.7 kg)   03/21/22 220 lb 9.6 oz (100.1 kg)   12/01/21 223 lb (101.2 kg)     Temp Readings from Last 3 Encounters:   No data found for Temp     BP Readings from Last 3 Encounters:   06/07/22 110/70   03/21/22 114/67   12/01/21 128/66     Pulse Readings from Last 3 Encounters:   06/07/22 76   03/21/22 81   12/01/21 80       PHYSICAL EXAM:  /70   Pulse 76   Ht 6' 1\" (1.854 m)   Wt 222 lb (100.7 kg)   BMI 29.29 kg/m²   Physical Examination: General appearance - alert, well appearing, and in no distress  Mental status - alert, oriented to person, place, and time  Eyes - pupils equal and reactive, extraocular eye movements intact  Neck/lymph - supple, no significant adenopathy  Chest/lungs - clear to auscultation, no wheezes, rales or rhonchi, symmetric air entry  Heart/CV - normal rate, regular rhythm, normal S1, S2, no murmurs, rubs, clicks or gallops  Abdomen/GI - soft, nontender, nondistended, no masses or organomegaly  Musculoskeletal - no joint tenderness, deformity or swelling  Extremities - peripheral pulses normal, no pedal edema, no clubbing or cyanosis  Skin - normal coloration and turgor, no rashes, no suspicious skin lesions noted    EKG: normal EKG, normal sinus rhythm. Medications reviewed and questions answered    No results found for this or any previous visit (from the past 672 hour(s)). Lab Results   Component Value Date    CHOL 87 03/21/2022    HDL 22 03/21/2022    VLDL 33 03/21/2022       ASSESSMENT and PLAN  No follow-up provider specified.  is for   Specialty Problems        Cardiology Problems    Mixed hyperlipidemia        CAD (coronary artery disease) PLAN:  Coronary artery disease  Stable on appropriate guideline directed medical therapy    Chronic suppressive therapy for history of DVT  Patient is on 5 mg p.o. twice daily which is the treatment dose. The correct dose is 2.5 mg p.o. twice daily we have provided information and a prescription for the correct dosing. He is received apparently a significant supply recently from the [place] he can finish those out and then moved to the appropriate dose    Follow-up in 6 months for CAD    Continue meds as below    Current Outpatient Medications:     metFORMIN (GLUCOPHAGE) 500 MG tablet, Take 500 mg by mouth 2 times daily (with meals), Disp: , Rfl:     apixaban (ELIQUIS) 2.5 MG TABS tablet, Take 1 tablet by mouth 2 times daily, Disp: 180 tablet, Rfl: 3    acetaminophen (TYLENOL) 325 MG tablet, Take 650 mg by mouth every 6 hours as needed, Disp: , Rfl:     albuterol sulfate  (90 Base) MCG/ACT inhaler, Inhale 2 puffs into the lungs every 4 hours as needed, Disp: , Rfl:     aspirin 81 MG chewable tablet, Take 81 mg by mouth daily, Disp: , Rfl:     empagliflozin (JARDIANCE) 25 MG tablet, Take 25 mg by mouth daily, Disp: , Rfl:     losartan (COZAAR) 50 MG tablet, Take 50 mg by mouth daily, Disp: , Rfl:     nitroGLYCERIN (NITROSTAT) 0.4 MG SL tablet, Place 0.4 mg under the tongue, Disp: , Rfl:     pantoprazole (PROTONIX) 40 MG tablet, Take 40 mg by mouth daily, Disp: , Rfl:     rosuvastatin (CRESTOR) 20 MG tablet, Take 20 mg by mouth, Disp: , Rfl:     Semaglutide,0.25 or 0.5MG/DOS, 2 MG/1.5ML SOPN, Inject into the skin every 7 days, Disp: , Rfl:     SITagliptin (JANUVIA) 100 MG tablet, Take 100 mg by mouth daily, Disp: , Rfl:     Dedra Ross MD  6/7/2022  8:02 AM    Pt is instructed to follow all appropriate cardiac risk factor recommendations and to be compliant with meds and testing. Instructed to follow up appropriately and seek urgent medical care if acute or unstable issues arise.  Results of some tests may be viewed thru [address] but this does not substitute for follow up with MD. If follow up is not scheduled pt is instructed to call for follow up

## 2022-08-22 ENCOUNTER — OFFICE VISIT (OUTPATIENT)
Dept: INTERNAL MEDICINE CLINIC | Facility: CLINIC | Age: 67
End: 2022-08-22
Payer: COMMERCIAL

## 2022-08-22 VITALS
DIASTOLIC BLOOD PRESSURE: 65 MMHG | TEMPERATURE: 98.1 F | BODY MASS INDEX: 28.36 KG/M2 | RESPIRATION RATE: 18 BRPM | OXYGEN SATURATION: 97 % | WEIGHT: 214 LBS | HEART RATE: 82 BPM | HEIGHT: 73 IN | SYSTOLIC BLOOD PRESSURE: 108 MMHG

## 2022-08-22 DIAGNOSIS — E11.65 TYPE 2 DIABETES MELLITUS WITH HYPERGLYCEMIA, WITHOUT LONG-TERM CURRENT USE OF INSULIN (HCC): ICD-10-CM

## 2022-08-22 DIAGNOSIS — R06.02 SOB (SHORTNESS OF BREATH): Primary | ICD-10-CM

## 2022-08-22 DIAGNOSIS — I25.118 ATHEROSCLEROTIC HEART DISEASE OF NATIVE CORONARY ARTERY WITH OTHER FORMS OF ANGINA PECTORIS (HCC): ICD-10-CM

## 2022-08-22 DIAGNOSIS — N52.1 ERECTILE DYSFUNCTION DUE TO DISEASES CLASSIFIED ELSEWHERE: ICD-10-CM

## 2022-08-22 PROCEDURE — 3046F HEMOGLOBIN A1C LEVEL >9.0%: CPT | Performed by: INTERNAL MEDICINE

## 2022-08-22 PROCEDURE — 1123F ACP DISCUSS/DSCN MKR DOCD: CPT | Performed by: INTERNAL MEDICINE

## 2022-08-22 PROCEDURE — G8427 DOCREV CUR MEDS BY ELIG CLIN: HCPCS | Performed by: INTERNAL MEDICINE

## 2022-08-22 PROCEDURE — 3017F COLORECTAL CA SCREEN DOC REV: CPT | Performed by: INTERNAL MEDICINE

## 2022-08-22 PROCEDURE — G8417 CALC BMI ABV UP PARAM F/U: HCPCS | Performed by: INTERNAL MEDICINE

## 2022-08-22 PROCEDURE — 1036F TOBACCO NON-USER: CPT | Performed by: INTERNAL MEDICINE

## 2022-08-22 PROCEDURE — 2022F DILAT RTA XM EVC RTNOPTHY: CPT | Performed by: INTERNAL MEDICINE

## 2022-08-22 PROCEDURE — 99214 OFFICE O/P EST MOD 30 MIN: CPT | Performed by: INTERNAL MEDICINE

## 2022-08-22 RX ORDER — TADALAFIL 10 MG/1
10 TABLET ORAL DAILY PRN
Qty: 10 TABLET | Refills: 1 | Status: SHIPPED | OUTPATIENT
Start: 2022-08-22

## 2022-08-22 ASSESSMENT — PATIENT HEALTH QUESTIONNAIRE - PHQ9
SUM OF ALL RESPONSES TO PHQ QUESTIONS 1-9: 0
SUM OF ALL RESPONSES TO PHQ QUESTIONS 1-9: 0
2. FEELING DOWN, DEPRESSED OR HOPELESS: 0
1. LITTLE INTEREST OR PLEASURE IN DOING THINGS: 0
SUM OF ALL RESPONSES TO PHQ9 QUESTIONS 1 & 2: 0
SUM OF ALL RESPONSES TO PHQ QUESTIONS 1-9: 0
SUM OF ALL RESPONSES TO PHQ QUESTIONS 1-9: 0

## 2022-08-22 NOTE — PROGRESS NOTES
08/22/2022   Location:[facility] [address] [facility]  [place]  Patient #:  [id number]  YOB: 1955        History of Present Illness     Chief Complaint   Patient presents with    Follow-up Chronic Condition     4 month f/u       Mr. Lety Maki is a 69 y.o. male  who presents for Follow up on chronic medical issues. There is compliance and tolerance with medications. Chronic active medical issues  include  DM, HLD, HTN, and CAD. Seeing Diabetes specialist at the [place]. BS iin the 200s did not call or send in readings as requested. Diabetes is now being managed by diabetes specialist at the [place]. Ozempic has been added. He is also already on Januvia. Will have labs drawn at upcoming [place] visit. He will send copy of labs to me. Did not bring in BS readings. Changes in diet. Has lost some wt. Wants to try medication for ED. He has prn NTG. He reports no chest pain and that he has never had to use NTG. Discussed important drug interaction with ED meds. He understands.   Last Labs  CBC:   Lab Results   Component Value Date/Time    WBC 6.9 11/09/2021 08:47 AM    RBC 5.75 11/09/2021 08:47 AM    HGB 17.6 11/09/2021 08:47 AM    HCT 53.6 11/09/2021 08:47 AM    MCV 93 11/09/2021 08:47 AM    MCH 30.6 11/09/2021 08:47 AM    MCHC 32.8 11/09/2021 08:47 AM    RDW 13.0 11/09/2021 08:47 AM     11/09/2021 08:47 AM    MPV 10.8 07/23/2020 09:00 AM     CMP:    Lab Results   Component Value Date/Time     03/21/2022 08:43 AM    K 4.5 03/21/2022 08:43 AM     03/21/2022 08:43 AM    CO2 19 03/21/2022 08:43 AM    BUN 15 03/21/2022 08:43 AM    CREATININE 1.08 03/21/2022 08:43 AM    GFRAA 78 11/09/2021 08:47 AM    AGRATIO 1.9 11/09/2021 08:47 AM    GLUCOSE 166 03/21/2022 08:43 AM    PROT 6.4 11/09/2021 08:47 AM    LABALBU 4.2 11/09/2021 08:47 AM    CALCIUM 9.4 03/21/2022 08:43 AM    BILITOT 0.6 11/09/2021 08:47 AM    ALKPHOS 57 11/09/2021 08:47 AM    AST 14 11/09/2021 08:47 AM ALT 19 2022 08:43 AM     HgBA1c:    Lab Results   Component Value Date/Time    LABA1C 9.9 2022 08:43 AM     Microalbumen/Creatinine ratio:  No components found for: RUCREAT  FLP:    Lab Results   Component Value Date/Time    TRIG 207 2022 08:43 AM    HDL 22 2022 08:43 AM    LDLCALC 32 2022 08:43 AM     TSH:    Lab Results   Component Value Date/Time    TSH 1.330 2021 08:47 AM       Allergies   Allergen Reactions    Ticagrelor Other (See Comments)     Chest pain     Past Medical History:   Diagnosis Date    Acute prostatitis 3/11/2015    Bilateral pulmonary embolism (HCC)     CAD (coronary artery disease)     Calculus of kidney     Diabetes (HCC)     DVT of lower limb, acute ([facility] 2020    Esophageal reflux 3/11/2015    GERD (gastroesophageal reflux disease)     Hallux valgus (acquired)     Hematuria, unspecified     Impotence of organic origin 3/11/2015    Other and unspecified hyperlipidemia     Primary localized osteoarthrosis, unspecified site      Social History     Socioeconomic History    Marital status:      Spouse name: None    Number of children: None    Years of education: None    Highest education level: None   Tobacco Use    Smoking status: Former     Packs/day: 0.50     Types: Cigarettes     Quit date: 1993     Years since quittin.6    Smokeless tobacco: Former   Substance and Sexual Activity    Alcohol use: No    Drug use: No     Past Surgical History:   Procedure Laterality Date    CARDIAC CATHETERIZATION  2019    LV:EF=65%. LVEDP=21. LM:nl. LAD:patent stents. DX 1&Dx 2 are patent. LCX:patent stents. RCA:20-30% mid stenosis. Slow filling c/w endothelial dysfunction described.     CHOLECYSTECTOMY      HERNIA REPAIR      IR TRANSCATH INFUSION THROMB NONCORONARY  6/10/2020    [place] CARDIAC SURG PROCEDURE UNLIST      UROLOGICAL SURGERY      kidney stone removal     Family History   Problem Relation Age of Onset    Cancer Mother         Liver Cancer Current Outpatient Medications   Medication Sig Dispense Refill    tadalafil (CIALIS) 10 MG tablet Take 1 tablet by mouth daily as needed for Erectile Dysfunction 10 tablet 1    metFORMIN (GLUCOPHAGE) 500 MG tablet Take 500 mg by mouth 2 times daily (with meals)      apixaban (ELIQUIS) 2.5 MG TABS tablet Take 1 tablet by mouth 2 times daily 180 tablet 3    acetaminophen (TYLENOL) 325 MG tablet Take 650 mg by mouth every 6 hours as needed      albuterol sulfate  (90 Base) MCG/ACT inhaler Inhale 2 puffs into the lungs every 4 hours as needed      aspirin 81 MG chewable tablet Take 81 mg by mouth daily      empagliflozin (JARDIANCE) 25 MG tablet Take 25 mg by mouth daily      losartan (COZAAR) 50 MG tablet Take 50 mg by mouth daily      nitroGLYCERIN (NITROSTAT) 0.4 MG SL tablet Place 0.4 mg under the tongue      pantoprazole (PROTONIX) 40 MG tablet Take 40 mg by mouth daily      rosuvastatin (CRESTOR) 20 MG tablet Take 20 mg by mouth      Semaglutide,0.25 or 0.5MG/DOS, 2 MG/1.5ML SOPN Inject into the skin every 7 days      SITagliptin (JANUVIA) 100 MG tablet Take 100 mg by mouth daily       No current facility-administered medications for this visit.      Health Maintenance   Topic Date Due    Annual Wellness Visit (AWV)  Never done    Pneumococcal 65+ years Vaccine (1 - PCV) Never done    Diabetic retinal exam  Never done    Hepatitis C screen  Never done    Shingles vaccine (1 of 2) Never done    COVID-19 Vaccine (3 - Booster for Moderna series) 09/06/2021    A1C test (Diabetic or Prediabetic)  06/21/2022    Diabetic foot exam  07/06/2022    Flu vaccine (1) Never done    Diabetic microalbuminuria test  03/21/2023    Lipids  03/21/2023    Depression Screen  08/22/2023    Colorectal Cancer Screen  01/13/2024    DTaP/Tdap/Td vaccine (2 - Td or Tdap) 01/13/2025    AAA screen  Completed    Hepatitis A vaccine  Aged Out    Hib vaccine  Aged Out    Meningococcal (ACWY) vaccine  Aged Out             Review of Systems  Review of Systems   Constitutional:  Negative for fever. Respiratory:  Positive for shortness of breath. Negative for cough. Cardiovascular:  Negative for chest pain. Gastrointestinal:  Negative for abdominal pain. Genitourinary:  Negative for difficulty urinating. /65 (Site: Left Upper Arm, Position: Sitting)   Pulse 82   Temp 98.1 °F (36.7 °C) (Temporal)   Resp 18   Ht 6' 1\" (1.854 m)   Wt 214 lb (97.1 kg)   SpO2 97%   BMI 28.23 kg/m²     Wt Readings from Last 3 Encounters:   08/22/22 214 lb (97.1 kg)   06/07/22 222 lb (100.7 kg)   03/21/22 220 lb 9.6 oz (100.1 kg)       Physical Exam    Physical Exam  Vitals and nursing note reviewed. Constitutional:       General: He is not in acute distress. Appearance: Normal appearance. He is not ill-appearing. HENT:      Head: Normocephalic and atraumatic. Cardiovascular:      Rate and Rhythm: Normal rate and regular rhythm. Pulmonary:      Effort: Pulmonary effort is normal.      Breath sounds: Normal breath sounds. Abdominal:      General: Bowel sounds are normal.      Palpations: Abdomen is soft. Musculoskeletal:      Right foot: Deformity and bunion present. Left foot: Deformity and bunion present. Feet:      Comments: Diabetic foot exam:   Left Foot:   Visual Exam: callous-    Pulse DP: 2+ (normal)   Filament test: reduced sensation   Vibratory Sensation: diminished  Right Foot:   Visual Exam: callous-               Pulse DP: 2+ (normal)   Filament test: reduced sensation   Vibratory Sensation: diminished    Skin:     General: Skin is warm and dry. Neurological:      Mental Status: He is alert. Assessment & Plan    Encounter Diagnoses   Name Primary?     SOB (shortness of breath) Yes    Type 2 diabetes mellitus with hyperglycemia, without long-term current use of insulin (HCC)     Atherosclerotic heart disease of native coronary artery with other forms of angina pectoris [facility])     Erectile dysfunction due to diseases classified elsewhere        Orders Placed This Encounter   Medications    tadalafil (CIALIS) 10 MG tablet     Sig: Take 1 tablet by mouth daily as needed for Erectile Dysfunction     Dispense:  10 tablet     Refill:  1       Orders Placed This Encounter   Procedures    XR CHEST PA LAT (2 VIEWS)     Standing Status:   Future     Number of Occurrences:   1     Standing Expiration Date:   8/22/2023     Scheduling Instructions:      [place] [facility]     Order Specific Question:   Reason for exam:     Answer:   sob    Hemoglobin A1C     Standing Status:   Future     Standing Expiration Date:   2/05/8391    Basic Metabolic Panel     Standing Status:   Future     Standing Expiration Date:   8/22/2023     Cxr for SOB  Consider PFTs. He is currently no interested. Encouraged to discuss with [facility] providers. The patient is asked to make an attempt to improve diet and exercise patterns to aid in medical management of this problem. Discussed the importance of regular exercise and a healthy diet. Return in about 4 months (around 12/22/2022) for routine follow up of chronic medical issues.         Abby Molina MD

## 2022-08-24 ENCOUNTER — TELEPHONE (OUTPATIENT)
Dept: INTERNAL MEDICINE CLINIC | Facility: CLINIC | Age: 67
End: 2022-08-24

## 2022-08-24 DIAGNOSIS — R06.02 SOB (SHORTNESS OF BREATH): ICD-10-CM

## 2022-08-24 PROCEDURE — 71046 X-RAY EXAM CHEST 2 VIEWS: CPT | Performed by: INTERNAL MEDICINE

## 2022-08-24 NOTE — TELEPHONE ENCOUNTER
----- Message from Papo Leonardo MD sent at 8/24/2022  2:56 PM EDT -----  CXR was stable. No signs of pneumonia, heart failure or scarring.    Consider seeing pulmonologist or  Consider doing PFTs to check out his lungs  further with his SOB  Papo Leonardo MD

## 2022-09-01 ASSESSMENT — ENCOUNTER SYMPTOMS
COUGH: 0
ABDOMINAL PAIN: 0
SHORTNESS OF BREATH: 1

## 2022-12-15 ENCOUNTER — OFFICE VISIT (OUTPATIENT)
Dept: CARDIOLOGY CLINIC | Age: 67
End: 2022-12-15
Payer: COMMERCIAL

## 2022-12-15 VITALS
HEIGHT: 73 IN | BODY MASS INDEX: 27.43 KG/M2 | DIASTOLIC BLOOD PRESSURE: 80 MMHG | SYSTOLIC BLOOD PRESSURE: 105 MMHG | HEART RATE: 86 BPM | WEIGHT: 207 LBS

## 2022-12-15 DIAGNOSIS — E78.5 DYSLIPIDEMIA: ICD-10-CM

## 2022-12-15 DIAGNOSIS — I10 ESSENTIAL HYPERTENSION: ICD-10-CM

## 2022-12-15 DIAGNOSIS — I25.118 CORONARY ARTERY DISEASE OF NATIVE ARTERY OF NATIVE HEART WITH STABLE ANGINA PECTORIS (HCC): Primary | ICD-10-CM

## 2022-12-15 DIAGNOSIS — E11.9 TYPE 2 DIABETES MELLITUS WITHOUT COMPLICATION, WITHOUT LONG-TERM CURRENT USE OF INSULIN (HCC): ICD-10-CM

## 2022-12-15 PROCEDURE — 1123F ACP DISCUSS/DSCN MKR DOCD: CPT | Performed by: INTERNAL MEDICINE

## 2022-12-15 PROCEDURE — 3017F COLORECTAL CA SCREEN DOC REV: CPT | Performed by: INTERNAL MEDICINE

## 2022-12-15 PROCEDURE — 3074F SYST BP LT 130 MM HG: CPT | Performed by: INTERNAL MEDICINE

## 2022-12-15 PROCEDURE — 3046F HEMOGLOBIN A1C LEVEL >9.0%: CPT | Performed by: INTERNAL MEDICINE

## 2022-12-15 PROCEDURE — 1036F TOBACCO NON-USER: CPT | Performed by: INTERNAL MEDICINE

## 2022-12-15 PROCEDURE — G8484 FLU IMMUNIZE NO ADMIN: HCPCS | Performed by: INTERNAL MEDICINE

## 2022-12-15 PROCEDURE — 2022F DILAT RTA XM EVC RTNOPTHY: CPT | Performed by: INTERNAL MEDICINE

## 2022-12-15 PROCEDURE — 99214 OFFICE O/P EST MOD 30 MIN: CPT | Performed by: INTERNAL MEDICINE

## 2022-12-15 PROCEDURE — 3078F DIAST BP <80 MM HG: CPT | Performed by: INTERNAL MEDICINE

## 2022-12-15 PROCEDURE — G8427 DOCREV CUR MEDS BY ELIG CLIN: HCPCS | Performed by: INTERNAL MEDICINE

## 2022-12-15 PROCEDURE — G8417 CALC BMI ABV UP PARAM F/U: HCPCS | Performed by: INTERNAL MEDICINE

## 2022-12-15 PROCEDURE — 93000 ELECTROCARDIOGRAM COMPLETE: CPT | Performed by: INTERNAL MEDICINE

## 2022-12-15 NOTE — PROGRESS NOTES
[address], [address] [place] [address], [address] [place] [address]  PHONE: [phone]    SUBJECTIVE: Allison Hernandez (1955) is a 69 y.o. male seen for a follow up visit regarding the following:   Specialty Problems          Cardiology Problems    Mixed hyperlipidemia        CAD (coronary artery disease)              Cath 2/2019  CONCLUSION:  Minimal nonobstructive coronary artery disease with preserved left  ventricular systolic function. Continued patency of the previously placed LAD and  circumflex stents. No culprit lesion is identified. The patient's symptoms could be  potentially caused by his elevated LVEDP or endothelial dysfunction. Continue  medical therapy. Had prior DVT in leg and is on chronic supressive therapy. 12/15/22  Pt doing well. No chest pain. No palpitations. Patient denies syncope. No dyspnea. States they are taking meds. Maintains a normal level of activity for them without symptoms. No dizziness or lightheadedness. All above conditions stable. 12/15/22  Pt doing well. No chest pain. No palpitations. Patient denies syncope. No dyspnea. States they are taking meds. Maintains a normal level of activity for them without symptoms. No dizziness or lightheadedness. All above conditions stable. Past Medical History, Past Surgical History, Family history, Social History, and Medications were all reviewed with the patient today and updated as necessary.     Allergies   Allergen Reactions    Ticagrelor Other (See Comments)     Chest pain     Past Medical History:   Diagnosis Date    Acute prostatitis 3/11/2015    Bilateral pulmonary embolism (HCC)     CAD (coronary artery disease)     Calculus of kidney     Diabetes ([address]     DVT of lower limb, acute ([address] 6/9/2020    Esophageal reflux 3/11/2015    GERD (gastroesophageal reflux disease)     Hallux valgus (acquired)     Hematuria, unspecified     Impotence of organic origin 3/11/2015    Other and unspecified hyperlipidemia     Primary localized osteoarthrosis, unspecified site      Past Surgical History:   Procedure Laterality Date    CARDIAC CATHETERIZATION  2019    LV:EF=65%. LVEDP=21. LM:nl. LAD:patent stents. DX 1&Dx 2 are patent. LCX:patent stents. RCA:20-30% mid stenosis. Slow filling c/w endothelial dysfunction described.     CHOLECYSTECTOMY      HERNIA REPAIR      IR TRANSCATH INFUSION THROMB NONCORONARY  6/10/2020    [place] CARDIAC SURG PROCEDURE UNLIST      UROLOGICAL SURGERY      kidney stone removal     Family History   Problem Relation Age of Onset    Cancer Mother         Liver Cancer      Social History     Tobacco Use    Smoking status: Former     Packs/day: 0.50     Types: Cigarettes     Quit date: 1993     Years since quittin.9    Smokeless tobacco: Former   Substance Use Topics    Alcohol use: No       Current Outpatient Medications:     tadalafil (CIALIS) 10 MG tablet, Take 1 tablet by mouth daily as needed for Erectile Dysfunction, Disp: 10 tablet, Rfl: 1    metFORMIN (GLUCOPHAGE) 500 MG tablet, Take 500 mg by mouth 2 times daily (with meals), Disp: , Rfl:     apixaban (ELIQUIS) 2.5 MG TABS tablet, Take 1 tablet by mouth 2 times daily, Disp: 180 tablet, Rfl: 3    acetaminophen (TYLENOL) 325 MG tablet, Take 650 mg by mouth every 6 hours as needed, Disp: , Rfl:     albuterol sulfate  (90 Base) MCG/ACT inhaler, Inhale 2 puffs into the lungs every 4 hours as needed, Disp: , Rfl:     aspirin 81 MG chewable tablet, Take 81 mg by mouth daily, Disp: , Rfl:     empagliflozin (JARDIANCE) 25 MG tablet, Take 25 mg by mouth daily, Disp: , Rfl:     losartan (COZAAR) 50 MG tablet, Take 50 mg by mouth daily, Disp: , Rfl:     nitroGLYCERIN (NITROSTAT) 0.4 MG SL tablet, Place 0.4 mg under the tongue, Disp: , Rfl:     pantoprazole (PROTONIX) 40 MG tablet, Take 40 mg by mouth daily, Disp: , Rfl:     rosuvastatin (CRESTOR) 20 MG tablet, Take 20 mg by mouth, Disp: , Rfl:     Semaglutide,0.25 or 0.5MG/DOS, 2 MG/1.5ML SOPN, Inject into the skin every 7 days, Disp: , Rfl:     SITagliptin (JANUVIA) 100 MG tablet, Take 100 mg by mouth daily, Disp: , Rfl:     ROS:  Review of Systems - General ROS: negative for - chills, fatigue or fever  Hematological and Lymphatic ROS: negative for - bleeding problems, bruising or jaundice  Respiratory ROS: no cough, shortness of breath, or wheezing  Cardiovascular ROS: no chest pain or dyspnea on exertion  Gastrointestinal ROS: no abdominal pain, change in bowel habits, or black or bloody stools  Neurological ROS: no TIA or stroke symptoms  All other systems negative. Wt Readings from Last 3 Encounters:   08/22/22 214 lb (97.1 kg)   06/07/22 222 lb (100.7 kg)   03/21/22 220 lb 9.6 oz (100.1 kg)     Temp Readings from Last 3 Encounters:   08/22/22 98.1 °F (36.7 °C) (Temporal)     BP Readings from Last 3 Encounters:   08/22/22 108/65   06/07/22 110/70   03/21/22 114/67     Pulse Readings from Last 3 Encounters:   08/22/22 82   06/07/22 76   03/21/22 81       PHYSICAL EXAM:  There were no vitals taken for this visit. Physical Examination: General appearance - alert, well appearing, and in no distress  Mental status - alert, oriented to person, place, and time  Eyes - pupils equal and reactive, extraocular eye movements intact  Neck/lymph - supple, no significant adenopathy  Chest/lungs - clear to auscultation, no wheezes, rales or rhonchi, symmetric air entry  Heart/CV - normal rate, regular rhythm, normal S1, S2, no murmurs, rubs, clicks or gallops  Abdomen/GI - soft, nontender, nondistended, no masses or organomegaly  Musculoskeletal - no joint tenderness, deformity or swelling  Extremities - peripheral pulses normal, no pedal edema, no clubbing or cyanosis  Skin - normal coloration and turgor, no rashes, no suspicious skin lesions noted    EKG: normal EKG, normal sinus rhythm.          Medications reviewed and questions answered    No results found for this or any previous visit (from the past 672 hour(s)). Lab Results   Component Value Date/Time    CHOL 87 03/21/2022 08:43 AM    HDL 22 03/21/2022 08:43 AM    VLDL 33 03/21/2022 08:43 AM       ASSESSMENT and PLAN  No follow-up provider specified. is for   Specialty Problems          Cardiology Problems    Mixed hyperlipidemia        CAD (coronary artery disease)                 PLAN:  Coronary artery disease  Stable on appropriate guideline directed medical therapy    Chronic suppressive therapy for history of DVT  Patient is on 5 mg p.o. twice daily which is the treatment dose. The correct dose is 2.5 mg p.o. twice daily we have provided information and a prescription for the correct dosing.   He is received apparently a significant supply recently from the [facility] he can finish those out and then moved to the appropriate dose    Follow-up in 6 months for CAD    Continue meds as below    Current Outpatient Medications:     tadalafil (CIALIS) 10 MG tablet, Take 1 tablet by mouth daily as needed for Erectile Dysfunction, Disp: 10 tablet, Rfl: 1    metFORMIN (GLUCOPHAGE) 500 MG tablet, Take 500 mg by mouth 2 times daily (with meals), Disp: , Rfl:     apixaban (ELIQUIS) 2.5 MG TABS tablet, Take 1 tablet by mouth 2 times daily, Disp: 180 tablet, Rfl: 3    acetaminophen (TYLENOL) 325 MG tablet, Take 650 mg by mouth every 6 hours as needed, Disp: , Rfl:     albuterol sulfate  (90 Base) MCG/ACT inhaler, Inhale 2 puffs into the lungs every 4 hours as needed, Disp: , Rfl:     aspirin 81 MG chewable tablet, Take 81 mg by mouth daily, Disp: , Rfl:     empagliflozin (JARDIANCE) 25 MG tablet, Take 25 mg by mouth daily, Disp: , Rfl:     losartan (COZAAR) 50 MG tablet, Take 50 mg by mouth daily, Disp: , Rfl:     nitroGLYCERIN (NITROSTAT) 0.4 MG SL tablet, Place 0.4 mg under the tongue, Disp: , Rfl:     pantoprazole (PROTONIX) 40 MG tablet, Take 40 mg by mouth daily, Disp: , Rfl:     rosuvastatin (CRESTOR) 20 MG tablet, Take 20 mg by mouth, Disp: , Rfl:     Semaglutide,0.25 or 0.5MG/DOS, 2 MG/1.5ML SOPN, Inject into the skin every 7 days, Disp: , Rfl:     SITagliptin (JANUVIA) 100 MG tablet, Take 100 mg by mouth daily, Disp: , Rfl:     Hilda King MD  12/15/2022  7:53 AM    Pt is instructed to follow all appropriate cardiac risk factor recommendations and to be compliant with meds and testing. Instructed to follow up appropriately and seek urgent medical care if acute or unstable issues arise.  Results of some tests may be viewed thru [address] but this does not substitute for follow up with MD. If follow up is not scheduled pt is instructed to call for follow up

## 2023-01-31 ENCOUNTER — OFFICE VISIT (OUTPATIENT)
Dept: INTERNAL MEDICINE CLINIC | Facility: CLINIC | Age: 68
End: 2023-01-31
Payer: COMMERCIAL

## 2023-01-31 VITALS
OXYGEN SATURATION: 97 % | DIASTOLIC BLOOD PRESSURE: 63 MMHG | WEIGHT: 217 LBS | BODY MASS INDEX: 28.76 KG/M2 | RESPIRATION RATE: 16 BRPM | HEART RATE: 82 BPM | HEIGHT: 73 IN | SYSTOLIC BLOOD PRESSURE: 101 MMHG | TEMPERATURE: 97.6 F

## 2023-01-31 DIAGNOSIS — E11.65 TYPE 2 DIABETES MELLITUS WITH HYPERGLYCEMIA, WITHOUT LONG-TERM CURRENT USE OF INSULIN (HCC): Primary | ICD-10-CM

## 2023-01-31 DIAGNOSIS — I25.118 CORONARY ARTERY DISEASE OF NATIVE ARTERY OF NATIVE HEART WITH STABLE ANGINA PECTORIS (HCC): ICD-10-CM

## 2023-01-31 DIAGNOSIS — E78.2 MIXED HYPERLIPIDEMIA: ICD-10-CM

## 2023-01-31 LAB
ALBUMIN SERPL-MCNC: 4 G/DL (ref 3.2–4.6)
ALBUMIN/GLOB SERPL: 1.3 (ref 0.4–1.6)
ALP SERPL-CCNC: 58 U/L (ref 50–136)
ALT SERPL-CCNC: 23 U/L (ref 12–65)
ANION GAP SERPL CALC-SCNC: 9 MMOL/L (ref 2–11)
AST SERPL-CCNC: 10 U/L (ref 15–37)
BILIRUB SERPL-MCNC: 0.9 MG/DL (ref 0.2–1.1)
BUN SERPL-MCNC: 16 MG/DL (ref 8–23)
CALCIUM SERPL-MCNC: 9.3 MG/DL (ref 8.3–10.4)
CHLORIDE SERPL-SCNC: 108 MMOL/L (ref 101–110)
CO2 SERPL-SCNC: 24 MMOL/L (ref 21–32)
CREAT SERPL-MCNC: 1.2 MG/DL (ref 0.8–1.5)
EST. AVERAGE GLUCOSE BLD GHB EST-MCNC: 171 MG/DL
GLOBULIN SER CALC-MCNC: 3.1 G/DL (ref 2.8–4.5)
GLUCOSE SERPL-MCNC: 146 MG/DL (ref 65–100)
HBA1C MFR BLD: 7.6 % (ref 4.8–5.6)
POTASSIUM SERPL-SCNC: 4.2 MMOL/L (ref 3.5–5.1)
PROT SERPL-MCNC: 7.1 G/DL (ref 6.3–8.2)
SODIUM SERPL-SCNC: 141 MMOL/L (ref 133–143)

## 2023-01-31 PROCEDURE — 1123F ACP DISCUSS/DSCN MKR DOCD: CPT | Performed by: INTERNAL MEDICINE

## 2023-01-31 PROCEDURE — 99213 OFFICE O/P EST LOW 20 MIN: CPT | Performed by: INTERNAL MEDICINE

## 2023-01-31 PROCEDURE — 3051F HG A1C>EQUAL 7.0%<8.0%: CPT | Performed by: INTERNAL MEDICINE

## 2023-01-31 RX ORDER — PANTOPRAZOLE SODIUM 40 MG/1
40 TABLET, DELAYED RELEASE ORAL DAILY
Qty: 90 TABLET | Refills: 3 | Status: CANCELLED | OUTPATIENT
Start: 2023-01-31

## 2023-01-31 ASSESSMENT — PATIENT HEALTH QUESTIONNAIRE - PHQ9
SUM OF ALL RESPONSES TO PHQ QUESTIONS 1-9: 0
1. LITTLE INTEREST OR PLEASURE IN DOING THINGS: 0
SUM OF ALL RESPONSES TO PHQ9 QUESTIONS 1 & 2: 0
SUM OF ALL RESPONSES TO PHQ QUESTIONS 1-9: 0
2. FEELING DOWN, DEPRESSED OR HOPELESS: 0

## 2023-01-31 NOTE — PROGRESS NOTES
01/31/2023   Location:[facility] [address] [facility]  [place]  Patient #:  [id number]  YOB: 1955        History of Present Illness     Chief Complaint   Patient presents with    Follow-up Chronic Condition     4 month f/u    Discuss Medications     Pt report he stopped all medication x 2 week and felt better. Mr. Benson Robert is a 69 y.o. male  who presents for Follow up on chronic medical issues. There is compliance and tolerance with medications. Reports he had stopped taking all his medications due to not feeling good. He has since resumed medications. Denies any cp or sob. He has gained wt since last visit.    [facility] and   Keeps regular follow up with cardiologist    Last Labs  CMP:    Lab Results   Component Value Date/Time     01/31/2023 11:31 AM    K 4.2 01/31/2023 11:31 AM     01/31/2023 11:31 AM    CO2 24 01/31/2023 11:31 AM    BUN 16 01/31/2023 11:31 AM    CREATININE 1.20 01/31/2023 11:31 AM    GFRAA 78 11/09/2021 08:47 AM    AGRATIO 1.9 11/09/2021 08:47 AM    LABGLOM >60 01/31/2023 11:31 AM    LABGLOM 75 03/21/2022 08:43 AM    GLUCOSE 146 01/31/2023 11:31 AM    PROT 7.1 01/31/2023 11:31 AM    LABALBU 4.0 01/31/2023 11:31 AM    CALCIUM 9.3 01/31/2023 11:31 AM    BILITOT 0.9 01/31/2023 11:31 AM    ALKPHOS 58 01/31/2023 11:31 AM    ALKPHOS 57 11/09/2021 08:47 AM    AST 10 01/31/2023 11:31 AM    ALT 23 01/31/2023 11:31 AM     HgBA1c:    Lab Results   Component Value Date/Time    LABA1C 7.6 01/31/2023 11:31 AM       Allergies   Allergen Reactions    Ticagrelor Other (See Comments)     Chest pain     Past Medical History:   Diagnosis Date    Acute prostatitis 3/11/2015    Bilateral pulmonary embolism (HCC)     CAD (coronary artery disease)     Calculus of kidney     Diabetes ([address]     DVT of lower limb, acute ([facility] 6/9/2020    Esophageal reflux 3/11/2015    GERD (gastroesophageal reflux disease)     Hallux valgus (acquired)     Hematuria, unspecified     Impotence of organic origin 3/11/2015    Other and unspecified hyperlipidemia     Primary localized osteoarthrosis, unspecified site      Social History     Socioeconomic History    Marital status:      Spouse name: None    Number of children: None    Years of education: None    Highest education level: None   Tobacco Use    Smoking status: Former     Packs/day: 0.50     Types: Cigarettes     Quit date: 1993     Years since quittin.1    Smokeless tobacco: Former   Substance and Sexual Activity    Alcohol use: No    Drug use: No     Past Surgical History:   Procedure Laterality Date    CARDIAC CATHETERIZATION  2019    LV:EF=65%. LVEDP=21. LM:nl. LAD:patent stents. DX 1&Dx 2 are patent. LCX:patent stents. RCA:20-30% mid stenosis. Slow filling c/w endothelial dysfunction described.     CHOLECYSTECTOMY      HERNIA REPAIR      IR TRANSCATH INFUSION THROMB NONCORONARY  6/10/2020    [place] UNLISTED PROCEDURE CARDIAC SURGERY      UROLOGICAL SURGERY      kidney stone removal     Family History   Problem Relation Age of Onset    Cancer Mother         Liver Cancer     Current Outpatient Medications   Medication Sig Dispense Refill    metFORMIN (GLUCOPHAGE) 500 MG tablet Take 500 mg by mouth 2 times daily (with meals)      apixaban (ELIQUIS) 2.5 MG TABS tablet Take 1 tablet by mouth 2 times daily 180 tablet 3    acetaminophen (TYLENOL) 325 MG tablet Take 650 mg by mouth every 6 hours as needed      aspirin 81 MG chewable tablet Take 81 mg by mouth daily      losartan (COZAAR) 50 MG tablet Take 50 mg by mouth daily      nitroGLYCERIN (NITROSTAT) 0.4 MG SL tablet Place 0.4 mg under the tongue      pantoprazole (PROTONIX) 40 MG tablet Take 40 mg by mouth daily      rosuvastatin (CRESTOR) 20 MG tablet Take 20 mg by mouth      Semaglutide,0.25 or 0.5MG/DOS, 2 MG/1.5ML SOPN Inject into the skin every 7 days      tadalafil (CIALIS) 10 MG tablet Take 1 tablet by mouth daily as needed for Erectile Dysfunction (Patient not taking: No sig reported) 10 tablet 1    albuterol sulfate  (90 Base) MCG/ACT inhaler Inhale 2 puffs into the lungs every 4 hours as needed (Patient not taking: Reported on 1/31/2023)      empagliflozin (JARDIANCE) 25 MG tablet Take 25 mg by mouth daily (Patient not taking: Reported on 1/31/2023)      SITagliptin (JANUVIA) 100 MG tablet Take 100 mg by mouth daily (Patient not taking: Reported on 1/31/2023)       No current facility-administered medications for this visit. Health Maintenance   Topic Date Due    Pneumococcal 65+ years Vaccine (1 - PCV) Never done    Diabetic retinal exam  Never done    Hepatitis C screen  Never done    Shingles vaccine (1 of 2) Never done    COVID-19 Vaccine (3 - Booster for Vani Estimable series) 06/01/2021    Annual Wellness Visit (AWV)  Never done    A1C test (Diabetic or Prediabetic)  06/21/2022    Flu vaccine (1) Never done    Diabetic Alb to Cr ratio (uACR) test  03/21/2023    Lipids  03/21/2023    GFR test (Diabetes, CKD 3-4, OR last GFR 15-59)  03/21/2023    Diabetic foot exam  08/22/2023    Depression Screen  08/22/2023    Colorectal Cancer Screen  01/13/2024    DTaP/Tdap/Td vaccine (2 - Td or Tdap) 01/13/2025    AAA screen  Completed    Hepatitis A vaccine  Aged Out    Hib vaccine  Aged Out    Meningococcal (ACWY) vaccine  Aged Out             Review of Systems  Review of Systems   Constitutional:  Negative for fever. Eyes:  Negative for visual disturbance. Respiratory:  Negative for cough and shortness of breath. Cardiovascular:  Negative for chest pain and leg swelling. Gastrointestinal:  Negative for abdominal pain. Genitourinary:  Negative for difficulty urinating. Musculoskeletal:  Positive for arthralgias.      /63 (Site: Left Upper Arm, Position: Sitting)   Pulse 82   Temp 97.6 °F (36.4 °C) (Temporal)   Resp 16   Ht 6' 1\" (1.854 m)   Wt 217 lb (98.4 kg)   SpO2 97%   BMI 28.63 kg/m²     Wt Readings from Last 3 Encounters:   01/31/23 217 lb (98.4 kg)   12/15/22 207 lb (93.9 kg)   08/22/22 214 lb (97.1 kg)       Physical Exam    Physical Exam  Vitals and nursing note reviewed. Constitutional:       Appearance: Normal appearance. HENT:      Head: Normocephalic and atraumatic. Cardiovascular:      Rate and Rhythm: Normal rate and regular rhythm. Pulses:           Dorsalis pedis pulses are 1+ on the right side and 1+ on the left side. Pulmonary:      Effort: Pulmonary effort is normal.      Breath sounds: Normal breath sounds. Abdominal:      General: Bowel sounds are normal.      Palpations: Abdomen is soft. Tenderness: There is no abdominal tenderness. Musculoskeletal:      Right foot: Deformity and bunion present. Left foot: Deformity and bunion present. Feet:      Right foot:      Skin integrity: Callus (heels and plantar MTP joint) present. Left foot:      Skin integrity: Callus (heels  plantar surface of MTP joint) present. Comments: Diabetic foot exam:   Left Foot:   Visual Exam: callous-    Pulse DP: 1+ (weak)   Filament test: reduced sensation   Vibratory Sensation: diminished  Right Foot:   Visual Exam: callous-    Pulse DP: 1+ (weak)   Filament test: reduced sensation   Vibratory Sensation: diminished    Neurological:      General: No focal deficit present. Mental Status: He is alert and oriented to person, place, and time. Assessment & Plan    Encounter Diagnoses   Name Primary? Type 2 diabetes mellitus with hyperglycemia, without long-term current use of insulin (HCC) Yes    Coronary artery disease of native artery of native heart with stable angina pectoris ([address]     Mixed hyperlipidemia        No orders of the defined types were placed in this encounter.       Orders Placed This Encounter   Procedures    Comprehensive Metabolic Panel     Standing Status:   Future     Number of Occurrences:   1     Standing Expiration Date:   1/31/2024    Hemoglobin A1C     Standing Status:   Future     Number of Occurrences:   1     Standing Expiration Date:   1/31/2024       The patient is asked to make an attempt to improve diet and exercise patterns to aid in medical management of this problem. Discussed the importance of regular exercise and a healthy diet. Encouraged medical compliance. Discussed complications of poorly controlled diabetes including blindness, kidney failure, amputation due to poor circulation and nerve damage, MI and even death. Avoid sweet/sugary foods and beverages. Limit carbohydrates  in your diet. Carbohydrates like bread, pasta, rice and white potatoes are broken down by the body into glucose which is sugar. Sugar is a source of energy. So the more active you are the more sugar is burned off. Aim for 150 minutes of aerobic exercise over the course of a week. Walking is great exercise. He will bring in labs from [place] when he has them done in the spring/summer      Return in about 6 months (around 7/31/2023) for routine follow up of chronic medical issues.         Ashley Jolly MD

## 2023-02-15 ENCOUNTER — TELEPHONE (OUTPATIENT)
Dept: INTERNAL MEDICINE CLINIC | Facility: CLINIC | Age: 68
End: 2023-02-15

## 2023-02-15 ASSESSMENT — ENCOUNTER SYMPTOMS
ABDOMINAL PAIN: 0
SHORTNESS OF BREATH: 0
COUGH: 0

## 2023-02-15 NOTE — TELEPHONE ENCOUNTER
Diabetes control is better compared to last year. A1c has dropped from 9.9 to 7.6. Goal A1c is less than 7. Morning fasting goal reading is less than 120. If you think you are having issues with your meds please discuss with us ( your doctors) before stopping. Avoid sweet/sugary foods and beverages. Limit carbohydrates  in your diet. Carbohydrates like bread, pasta, rice and white potatoes are broken down by the body into glucose which is sugar. Sugar is a source of energy. So the more active you are the more sugar is burned off. Aim for 150 minutes of aerobic exercise over the course of a week. Walking is great exercise. Please have [facility] me copy of your labs.    Chano Lr MD

## 2023-06-30 ENCOUNTER — OFFICE VISIT (OUTPATIENT)
Age: 68
End: 2023-06-30
Payer: COMMERCIAL

## 2023-06-30 VITALS
SYSTOLIC BLOOD PRESSURE: 121 MMHG | HEIGHT: 73 IN | BODY MASS INDEX: 28.49 KG/M2 | HEART RATE: 71 BPM | WEIGHT: 215 LBS | DIASTOLIC BLOOD PRESSURE: 71 MMHG

## 2023-06-30 DIAGNOSIS — I10 ESSENTIAL HYPERTENSION: ICD-10-CM

## 2023-06-30 DIAGNOSIS — I25.118 CORONARY ARTERY DISEASE OF NATIVE ARTERY OF NATIVE HEART WITH STABLE ANGINA PECTORIS (HCC): Primary | ICD-10-CM

## 2023-06-30 DIAGNOSIS — E11.9 TYPE 2 DIABETES MELLITUS WITHOUT COMPLICATION, WITHOUT LONG-TERM CURRENT USE OF INSULIN (HCC): ICD-10-CM

## 2023-06-30 DIAGNOSIS — E78.5 DYSLIPIDEMIA: ICD-10-CM

## 2023-06-30 PROCEDURE — 1123F ACP DISCUSS/DSCN MKR DOCD: CPT | Performed by: INTERNAL MEDICINE

## 2023-06-30 PROCEDURE — 3074F SYST BP LT 130 MM HG: CPT | Performed by: INTERNAL MEDICINE

## 2023-06-30 PROCEDURE — 3051F HG A1C>EQUAL 7.0%<8.0%: CPT | Performed by: INTERNAL MEDICINE

## 2023-06-30 PROCEDURE — 99214 OFFICE O/P EST MOD 30 MIN: CPT | Performed by: INTERNAL MEDICINE

## 2023-06-30 PROCEDURE — 3078F DIAST BP <80 MM HG: CPT | Performed by: INTERNAL MEDICINE

## 2023-06-30 RX ORDER — NITROGLYCERIN 0.4 MG/1
0.4 TABLET SUBLINGUAL EVERY 5 MIN PRN
Qty: 25 TABLET | Refills: 1 | Status: SHIPPED | OUTPATIENT
Start: 2023-06-30

## 2023-08-01 ENCOUNTER — OFFICE VISIT (OUTPATIENT)
Dept: INTERNAL MEDICINE CLINIC | Facility: CLINIC | Age: 68
End: 2023-08-01
Payer: COMMERCIAL

## 2023-08-01 VITALS
HEIGHT: 73 IN | OXYGEN SATURATION: 96 % | TEMPERATURE: 97 F | SYSTOLIC BLOOD PRESSURE: 107 MMHG | BODY MASS INDEX: 27.62 KG/M2 | WEIGHT: 208.4 LBS | HEART RATE: 83 BPM | DIASTOLIC BLOOD PRESSURE: 74 MMHG | RESPIRATION RATE: 18 BRPM

## 2023-08-01 DIAGNOSIS — E78.2 MIXED HYPERLIPIDEMIA: ICD-10-CM

## 2023-08-01 DIAGNOSIS — E11.65 TYPE 2 DIABETES MELLITUS WITH HYPERGLYCEMIA, WITHOUT LONG-TERM CURRENT USE OF INSULIN (HCC): Primary | ICD-10-CM

## 2023-08-01 DIAGNOSIS — I25.118 CORONARY ARTERY DISEASE OF NATIVE ARTERY OF NATIVE HEART WITH STABLE ANGINA PECTORIS (HCC): ICD-10-CM

## 2023-08-01 LAB
ALBUMIN SERPL-MCNC: 4.3 G/DL (ref 3.2–4.6)
ALBUMIN/GLOB SERPL: 1.3 (ref 0.4–1.6)
ALP SERPL-CCNC: 64 U/L (ref 50–136)
ALT SERPL-CCNC: 21 U/L (ref 12–65)
ANION GAP SERPL CALC-SCNC: 8 MMOL/L (ref 2–11)
AST SERPL-CCNC: 16 U/L (ref 15–37)
BASOPHILS # BLD: 0.1 K/UL (ref 0–0.2)
BASOPHILS NFR BLD: 1 % (ref 0–2)
BILIRUB SERPL-MCNC: 1.2 MG/DL (ref 0.2–1.1)
BUN SERPL-MCNC: 16 MG/DL (ref 8–23)
CALCIUM SERPL-MCNC: 9.4 MG/DL (ref 8.3–10.4)
CHLORIDE SERPL-SCNC: 107 MMOL/L (ref 101–110)
CHOLEST SERPL-MCNC: 81 MG/DL
CO2 SERPL-SCNC: 26 MMOL/L (ref 21–32)
CREAT SERPL-MCNC: 1.3 MG/DL (ref 0.8–1.5)
CREAT UR-MCNC: 122 MG/DL
DIFFERENTIAL METHOD BLD: ABNORMAL
EOSINOPHIL # BLD: 0.1 K/UL (ref 0–0.8)
EOSINOPHIL NFR BLD: 1 % (ref 0.5–7.8)
ERYTHROCYTE [DISTWIDTH] IN BLOOD BY AUTOMATED COUNT: 14.6 % (ref 11.9–14.6)
GLOBULIN SER CALC-MCNC: 3.2 G/DL (ref 2.8–4.5)
GLUCOSE SERPL-MCNC: 136 MG/DL (ref 65–100)
HCT VFR BLD AUTO: 57.7 % (ref 41.1–50.3)
HDLC SERPL-MCNC: 25 MG/DL (ref 40–60)
HDLC SERPL: 3.2
HGB BLD-MCNC: 19.3 G/DL (ref 13.6–17.2)
IMM GRANULOCYTES # BLD AUTO: 0 K/UL (ref 0–0.5)
IMM GRANULOCYTES NFR BLD AUTO: 0 % (ref 0–5)
LDLC SERPL CALC-MCNC: 21.6 MG/DL
LYMPHOCYTES # BLD: 2.5 K/UL (ref 0.5–4.6)
LYMPHOCYTES NFR BLD: 31 % (ref 13–44)
MCH RBC QN AUTO: 31.1 PG (ref 26.1–32.9)
MCHC RBC AUTO-ENTMCNC: 33.4 G/DL (ref 31.4–35)
MCV RBC AUTO: 92.9 FL (ref 82–102)
MICROALBUMIN UR-MCNC: 0.84 MG/DL
MICROALBUMIN/CREAT UR-RTO: 7 MG/G (ref 0–30)
MONOCYTES # BLD: 0.7 K/UL (ref 0.1–1.3)
MONOCYTES NFR BLD: 8 % (ref 4–12)
NEUTS SEG # BLD: 4.6 K/UL (ref 1.7–8.2)
NEUTS SEG NFR BLD: 59 % (ref 43–78)
NRBC # BLD: 0 K/UL (ref 0–0.2)
PLATELET # BLD AUTO: 203 K/UL (ref 150–450)
PMV BLD AUTO: 10.4 FL (ref 9.4–12.3)
POTASSIUM SERPL-SCNC: 4.2 MMOL/L (ref 3.5–5.1)
PROT SERPL-MCNC: 7.5 G/DL (ref 6.3–8.2)
RBC # BLD AUTO: 6.21 M/UL (ref 4.23–5.6)
SODIUM SERPL-SCNC: 141 MMOL/L (ref 133–143)
TRIGL SERPL-MCNC: 172 MG/DL (ref 35–150)
TSH W FREE THYROID IF ABNORMAL: 1.49 UIU/ML (ref 0.36–3.74)
VLDLC SERPL CALC-MCNC: 34.4 MG/DL (ref 6–23)
WBC # BLD AUTO: 7.9 K/UL (ref 4.3–11.1)

## 2023-08-01 PROCEDURE — 1123F ACP DISCUSS/DSCN MKR DOCD: CPT | Performed by: INTERNAL MEDICINE

## 2023-08-01 PROCEDURE — 99214 OFFICE O/P EST MOD 30 MIN: CPT | Performed by: INTERNAL MEDICINE

## 2023-08-01 PROCEDURE — 3051F HG A1C>EQUAL 7.0%<8.0%: CPT | Performed by: INTERNAL MEDICINE

## 2023-08-01 SDOH — ECONOMIC STABILITY: INCOME INSECURITY: HOW HARD IS IT FOR YOU TO PAY FOR THE VERY BASICS LIKE FOOD, HOUSING, MEDICAL CARE, AND HEATING?: NOT HARD AT ALL

## 2023-08-01 SDOH — ECONOMIC STABILITY: FOOD INSECURITY: WITHIN THE PAST 12 MONTHS, YOU WORRIED THAT YOUR FOOD WOULD RUN OUT BEFORE YOU GOT MONEY TO BUY MORE.: NEVER TRUE

## 2023-08-01 SDOH — ECONOMIC STABILITY: FOOD INSECURITY: WITHIN THE PAST 12 MONTHS, THE FOOD YOU BOUGHT JUST DIDN'T LAST AND YOU DIDN'T HAVE MONEY TO GET MORE.: NEVER TRUE

## 2023-08-01 SDOH — ECONOMIC STABILITY: HOUSING INSECURITY
IN THE LAST 12 MONTHS, WAS THERE A TIME WHEN YOU DID NOT HAVE A STEADY PLACE TO SLEEP OR SLEPT IN A SHELTER (INCLUDING NOW)?: NO

## 2023-08-01 NOTE — PROGRESS NOTES
08/01/2023   Location:[address] [facility]  [place]  Patient #:  [id number]  YOB: 1955        History of Present Illness     Chief Complaint   Patient presents with    Follow-up     6 month     Diabetes    Coronary Artery Disease       Mr. Claude Sylvester is a 77 y.o. male  who presents for Follow up on chronic medical issues. There is compliance and tolerance with medications. Chronic active medical issues  include  DM, HLD, HTN, and CAD. Seeing Diabetes specialist at the [place]. BP was low for a few days on his vacation.      Last Labs  CBC:   Lab Results   Component Value Date/Time    WBC 7.9 08/01/2023 09:31 AM    RBC 6.21 08/01/2023 09:31 AM    HGB 19.3 08/01/2023 09:31 AM    HCT 57.7 08/01/2023 09:31 AM    MCV 92.9 08/01/2023 09:31 AM    MCH 31.1 08/01/2023 09:31 AM    MCHC 33.4 08/01/2023 09:31 AM    RDW 14.6 08/01/2023 09:31 AM     08/01/2023 09:31 AM    MPV 10.4 08/01/2023 09:31 AM     CMP:    Lab Results   Component Value Date/Time     08/01/2023 09:31 AM    K 4.2 08/01/2023 09:31 AM     08/01/2023 09:31 AM    CO2 26 08/01/2023 09:31 AM    BUN 16 08/01/2023 09:31 AM    CREATININE 1.30 08/01/2023 09:31 AM    GFRAA 78 11/09/2021 08:47 AM    AGRATIO 1.9 11/09/2021 08:47 AM    LABGLOM 59 08/01/2023 09:31 AM    LABGLOM 75 03/21/2022 08:43 AM    GLUCOSE 136 08/01/2023 09:31 AM    PROT 7.5 08/01/2023 09:31 AM    LABALBU 4.3 08/01/2023 09:31 AM    CALCIUM 9.4 08/01/2023 09:31 AM    BILITOT 1.2 08/01/2023 09:31 AM    ALKPHOS 64 08/01/2023 09:31 AM    ALKPHOS 57 11/09/2021 08:47 AM    AST 16 08/01/2023 09:31 AM    ALT 21 08/01/2023 09:31 AM     HgBA1c:    Lab Results   Component Value Date/Time    LABA1C 7.6 01/31/2023 11:31 AM     FLP:    Lab Results   Component Value Date/Time    TRIG 172 08/01/2023 09:31 AM    HDL 25 08/01/2023 09:31 AM    LDLCALC 21.6 08/01/2023 09:31 AM    LABVLDL 34.4 08/01/2023 09:31 AM     TSH:    Lab Results   Component

## 2023-08-10 ENCOUNTER — TELEPHONE (OUTPATIENT)
Dept: INTERNAL MEDICINE CLINIC | Facility: CLINIC | Age: 68
End: 2023-08-10

## 2023-08-10 DIAGNOSIS — R17 ELEVATED BILIRUBIN: Primary | ICD-10-CM

## 2023-08-16 ENCOUNTER — NURSE ONLY (OUTPATIENT)
Dept: INTERNAL MEDICINE CLINIC | Facility: CLINIC | Age: 68
End: 2023-08-16

## 2023-08-16 DIAGNOSIS — R17 ELEVATED BILIRUBIN: ICD-10-CM

## 2023-08-16 DIAGNOSIS — E11.65 TYPE 2 DIABETES MELLITUS WITH HYPERGLYCEMIA, WITHOUT LONG-TERM CURRENT USE OF INSULIN (HCC): ICD-10-CM

## 2023-08-16 LAB
ANION GAP SERPL CALC-SCNC: 11 MMOL/L (ref 2–11)
BILIRUB DIRECT SERPL-MCNC: 0.2 MG/DL
BILIRUB INDIRECT SERPL-MCNC: 0.6 MG/DL (ref 0–1.1)
BILIRUB SERPL-MCNC: 0.8 MG/DL (ref 0.2–1.1)
BUN SERPL-MCNC: 15 MG/DL (ref 8–23)
CALCIUM SERPL-MCNC: 9.1 MG/DL (ref 8.3–10.4)
CHLORIDE SERPL-SCNC: 109 MMOL/L (ref 101–110)
CO2 SERPL-SCNC: 22 MMOL/L (ref 21–32)
CREAT SERPL-MCNC: 1 MG/DL (ref 0.8–1.5)
GLUCOSE SERPL-MCNC: 177 MG/DL (ref 65–100)
POTASSIUM SERPL-SCNC: 3.8 MMOL/L (ref 3.5–5.1)
SODIUM SERPL-SCNC: 142 MMOL/L (ref 133–143)

## 2023-08-17 LAB
EST. AVERAGE GLUCOSE BLD GHB EST-MCNC: 157 MG/DL
HBA1C MFR BLD: 7.1 % (ref 4.8–5.6)

## 2023-08-18 ENCOUNTER — TELEPHONE (OUTPATIENT)
Dept: INTERNAL MEDICINE CLINIC | Facility: CLINIC | Age: 68
End: 2023-08-18

## 2023-08-18 NOTE — RESULT ENCOUNTER NOTE
Diabetes control is better. A1c had dropped from 7.6 to 7.1. Bilirubin is back to normal.  Kidney fxn is good.    Ludin Cordero MD

## 2023-08-18 NOTE — TELEPHONE ENCOUNTER
----- Message from Chey Gallegos MD sent at 8/18/2023  1:40 AM EDT -----  Diabetes control is better. A1c had dropped from 7.6 to 7.1. Bilirubin is back to normal.  Kidney fxn is good.    Chey Gallegos MD

## 2023-09-13 ENCOUNTER — TELEPHONE (OUTPATIENT)
Dept: INTERNAL MEDICINE CLINIC | Facility: CLINIC | Age: 68
End: 2023-09-13

## 2023-09-13 NOTE — TELEPHONE ENCOUNTER
Called and spoke with patient. Readings have been in 90's/60s. He has been symptomatic with dizziness. No chest pain. No increased SOB. Currently taking Losartan 100mg - taking 1/2 tablet daily. No other BP meds or diuretics. He will hold his Losartan and monitor BP twice a day. Call back on Friday with readings. May need to consider decreasing med Losartan 25mg daily if his BP gets too high off meds. Increase fluids. Go to ER for cp increase sob or presyncope/syncope.   Clotilde Romero MD

## 2023-09-13 NOTE — TELEPHONE ENCOUNTER
Patient states his blood pressure has been running low for about 3 weeks now. Running 94/65 and 97/67. Monday night he got up to use the bathroom and got really dizzy and legs weak. He fell in the floor. Wasn't hurt but needs to know what he needs to do about his blood pressure.  Also he wasn't sure if he was still taking the losartan or not

## 2023-09-22 ENCOUNTER — TELEPHONE (OUTPATIENT)
Age: 68
End: 2023-09-22

## 2023-09-22 NOTE — TELEPHONE ENCOUNTER
Patient is requesting a copy be mailed to his home address also. Cardiac Clearance        Physician or Jorge Mcgarry  :   Contact Phone Number: [phone]  Fax Number: [phone]  Date of Surgery/Procedure: TBD  Type of Surgery or Procedure: Bunion Surgery Rt.  Big Toe  Type of Anesthesia: General    Type of Clearance Requested:   Medication to Hold:Eliquis  Days to Hold: Cardiologist

## 2023-10-26 ENCOUNTER — TELEPHONE (OUTPATIENT)
Age: 68
End: 2023-10-26

## 2023-10-26 NOTE — TELEPHONE ENCOUNTER
----- Message from Marcelo Ramos MD sent at 10/26/2023 11:47 AM EDT -----  Regarding: RE: Cardiac Clearance  Low risk for procedure. He is on vascular dose Xarelto which can be held for 2 days prior to procedure  ----- Message -----  From: Xavi Alvardao MA  Sent: 10/26/2023  11:37 AM EDT  To: Marcelo Ramos MD  Subject: Cardiac Clearance                                Requesting cardiac clearance and risk stratification for penile implant surgery with the Department of [facility].     Pt last seen 6/30/23

## 2023-11-25 NOTE — PROCEDURES
[facility]  CARDIAC CATH    Name:  Clotilde Falcon  MR#:  [id number]  :  1955  ACCOUNT #:  [de-identified]  DATE OF SERVICE:  2019    PROCEDURES PERFORMED:  Left heart cath, selective coronary angiography, left  ventriculogram.    INDICATION:  Recurrent angina with known CAD. SURGEON:  Rebeca Rod MD    COMPLICATIONS:  None. ACCESS:  Right radial.    TIME:  8:16 to 8:52    ANESTHESIA:  2 mg of Versed were given by Irving Norwood RN.    HEMODYNAMICS:  Aortic pressure 92/62. LVEDP 21. DESCRIPTION:  A 60 mL of contrast were administered. TIG-4 and pigtail were used. FINDINGS:  Left ventriculogram done in the LITTLE projection shows EF 65%. No gradient  on pullback. Elevated LVEDP of 21. Left main trunk:  Left main arises off the left cusp in a normal fashion. It is a  large-caliber vessel, courses for a short distance then divides in an LAD and  circumflex. The left main has no disease. Left anterior descending: The LAD courses off the left main and wraps around the  apex. The LAD is a sizeable vessel that has previously been stented. All the stents  in the LAD are widely patent with no in-stent restenosis. The two diagonals are  widely patent with no disease. There is evidence of some slower filling consistent  with endothelial dysfunction. Circumflex artery:  The circumflex artery in the AV groove is nondominant and  supplies a significant major and mid-vessel OM and a terminal OM. The mid-circumflex  has been stented. The circumflex is widely patent. The stent is widely patent. The  two OM vessels are widely patent. Right coronary artery:  The right coronary artery arises off the right cusp in a  normal fashion. It courses in AV groove and supplies the posterior descending and  posterior lateral.  There is mild nonobstructive 20% to 30% irregularity in the  mid-right. Distal right is free of any significant disease.   Posterior lateral and  posterior descending have no significant disease. Again, the right exhibits some  slow filling consistent with endothelial dysfunction. CONCLUSION:  Minimal nonobstructive coronary artery disease with preserved left  ventricular systolic function. Continued patency of the previously placed LAD and  circumflex stents. No culprit lesion is identified. The patient's symptoms could be  potentially caused by his elevated LVEDP or endothelial dysfunction. Continue  medical therapy.       Naila Gonzalez MD      [id number]  D:  02/18/2019 8:49  T:  02/19/2019 8:20  JOB #:  [id number] CONST: Well appearing in NAD  EYES: PERRL, EOMI, Sclera and conjunctiva clear.   ENT: No nasal discharge. Oropharynx normal appearing  NECK: Non-tender, no meningeal signs. normal ROM. supple   CARD: Normal S1 S2; Normal rate and rhythm  RESP: Equal BS B/L, No wheezes, rhonchi or rales. No distress  GI: Soft, non-tender, non-distended. no cva tenderness. normal BS  SKIN: Warm, dry, no acute rashes. Good turgor

## 2023-12-11 ENCOUNTER — OFFICE VISIT (OUTPATIENT)
Age: 68
End: 2023-12-11
Payer: COMMERCIAL

## 2023-12-11 VITALS
WEIGHT: 208 LBS | DIASTOLIC BLOOD PRESSURE: 74 MMHG | SYSTOLIC BLOOD PRESSURE: 119 MMHG | HEART RATE: 92 BPM | HEIGHT: 73 IN | BODY MASS INDEX: 27.57 KG/M2

## 2023-12-11 DIAGNOSIS — I25.10 CORONARY ARTERY DISEASE INVOLVING NATIVE CORONARY ARTERY OF NATIVE HEART WITHOUT ANGINA PECTORIS: Primary | ICD-10-CM

## 2023-12-11 DIAGNOSIS — I10 ESSENTIAL HYPERTENSION: ICD-10-CM

## 2023-12-11 DIAGNOSIS — E11.9 TYPE 2 DIABETES MELLITUS WITHOUT COMPLICATION, WITHOUT LONG-TERM CURRENT USE OF INSULIN (HCC): ICD-10-CM

## 2023-12-11 DIAGNOSIS — E78.5 DYSLIPIDEMIA: ICD-10-CM

## 2023-12-11 PROCEDURE — 3051F HG A1C>EQUAL 7.0%<8.0%: CPT | Performed by: INTERNAL MEDICINE

## 2023-12-11 PROCEDURE — 1123F ACP DISCUSS/DSCN MKR DOCD: CPT | Performed by: INTERNAL MEDICINE

## 2023-12-11 PROCEDURE — 99214 OFFICE O/P EST MOD 30 MIN: CPT | Performed by: INTERNAL MEDICINE

## 2023-12-11 PROCEDURE — 3078F DIAST BP <80 MM HG: CPT | Performed by: INTERNAL MEDICINE

## 2023-12-11 PROCEDURE — 3074F SYST BP LT 130 MM HG: CPT | Performed by: INTERNAL MEDICINE

## 2023-12-11 RX ORDER — LOSARTAN POTASSIUM 50 MG/1
50 TABLET ORAL DAILY
COMMUNITY

## 2023-12-11 RX ORDER — NITROGLYCERIN 0.4 MG/1
0.4 TABLET SUBLINGUAL EVERY 5 MIN PRN
Qty: 25 TABLET | Refills: 1 | Status: SHIPPED | OUTPATIENT
Start: 2023-12-11

## 2023-12-11 RX ORDER — PIOGLITAZONEHYDROCHLORIDE 15 MG/1
15 TABLET ORAL DAILY
COMMUNITY

## 2023-12-11 NOTE — PROGRESS NOTES
AM    Appropriate evaluation of guideline directed medical therapy for the patient's conditions have been reviewed. If appropriate, adjustment of therapy for underlying cardiac issues has been offered. If patient wishes for adjustment of therapy which would include intensification of pharmacologic therapy for any above conditions this will be sent to appropriate pharmacy. If patient politely declines any further changes they will be encouraged to continue with current treatment and appropriate risk factor modification and healthy lifestyle. Pt is instructed to follow all appropriate cardiac risk factor recommendations and to be compliant with meds and testing. Instructed to follow up appropriately and seek urgent medical care if acute or unstable issues arise. Results of some tests may be viewed thru [facility] but this does not substitute for follow up with MD. If follow up is not scheduled pt is instructed to call for follow up. Any non cardiac issues identified in this visit the patient is counseled to address these with their primary care physician or appropriate specialist.    I have personally seen and evaluated the patient and reviewed the students note and agree with the following assessment and plan and findings. I was present for and observed the key components of this note. Any appropriate additions or editing of the information have been done by me.     Geronimo Souza MD, [facility] [place]  Cardiology

## 2023-12-17 ENCOUNTER — CLINICAL DOCUMENTATION (OUTPATIENT)
Age: 68
End: 2023-12-17

## 2024-01-25 ENCOUNTER — TELEPHONE (OUTPATIENT)
Age: 69
End: 2024-01-25

## 2024-01-25 NOTE — TELEPHONE ENCOUNTER
Patient having Penile implant 04/03/24 with Dr. Lemus  under general. Requesting cardiac clearance and eliquis hold for 2 days prior. Fax: [phone]

## 2024-01-26 NOTE — TELEPHONE ENCOUNTER
Per Dr. Lara\" Low risk from a cardiac standpoint okay to hold Eliquis 2 days prior \" sent letter to preferred fax. JS

## 2024-02-20 ENCOUNTER — OFFICE VISIT (OUTPATIENT)
Dept: INTERNAL MEDICINE CLINIC | Facility: CLINIC | Age: 69
End: 2024-02-20
Payer: MEDICARE

## 2024-02-20 VITALS
OXYGEN SATURATION: 95 % | TEMPERATURE: 97 F | SYSTOLIC BLOOD PRESSURE: 95 MMHG | BODY MASS INDEX: 27.83 KG/M2 | WEIGHT: 210 LBS | DIASTOLIC BLOOD PRESSURE: 60 MMHG | HEART RATE: 84 BPM | HEIGHT: 73 IN

## 2024-02-20 DIAGNOSIS — I25.118 CORONARY ARTERY DISEASE OF NATIVE ARTERY OF NATIVE HEART WITH STABLE ANGINA PECTORIS (HCC): ICD-10-CM

## 2024-02-20 DIAGNOSIS — E11.65 TYPE 2 DIABETES MELLITUS WITH HYPERGLYCEMIA, WITHOUT LONG-TERM CURRENT USE OF INSULIN (HCC): Primary | ICD-10-CM

## 2024-02-20 PROCEDURE — 99213 OFFICE O/P EST LOW 20 MIN: CPT | Performed by: INTERNAL MEDICINE

## 2024-02-20 PROCEDURE — 1123F ACP DISCUSS/DSCN MKR DOCD: CPT | Performed by: INTERNAL MEDICINE

## 2024-02-20 NOTE — PROGRESS NOTES
vaccine  Aged Out    Meningococcal (ACWY) vaccine  Aged Out             Review of Systems  Review of Systems    BP 95/60 (Site: Left Upper Arm, Position: Sitting, Cuff Size: Medium Adult)   Pulse 84   Temp 97 °F (36.1 °C) (Temporal)   Ht 1.854 m (6' 1\")   Wt 95.3 kg (210 lb)   SpO2 95%   BMI 27.71 kg/m²     Wt Readings from Last 3 Encounters:   02/20/24 95.3 kg (210 lb)   12/11/23 94.3 kg (208 lb)   08/01/23 94.5 kg (208 lb 6.4 oz)       Physical Exam    Physical Exam  Vitals and nursing note reviewed.   Constitutional:       Appearance: Normal appearance.   HENT:      Head: Normocephalic and atraumatic.   Cardiovascular:      Rate and Rhythm: Normal rate and regular rhythm.   Pulmonary:      Effort: Pulmonary effort is normal.      Breath sounds: Normal breath sounds.   Abdominal:      General: Bowel sounds are normal.      Palpations: Abdomen is soft.   Feet:      Comments: Diabetic foot exam:   Left Foot:   Visual Exam: normal   Pulse DP: 2+ (normal)   Filament test: normal sensation   Vibratory Sensation: normal  Right Foot:   Visual Exam: normal   Pulse DP: 2+ (normal)   Filament test: normal sensation   Vibratory Sensation: normal      Skin:     General: Skin is warm and dry.   Neurological:      Mental Status: He is alert.             Assessment & Plan    There are no diagnoses linked to this encounter.       Encounter Diagnoses   Name Primary?    Type 2 diabetes mellitus with hyperglycemia, without long-term current use of insulin (HCC) Yes    Coronary artery disease of native artery of native heart with stable angina pectoris (HCC)        No orders of the defined types were placed in this encounter.      No orders of the defined types were placed in this encounter.          Return in about 6 months (around 8/20/2024) for routine follow up of chronic medical issues, and as needed for new or worsening problems.        Al Roberson MD

## 2024-06-06 ENCOUNTER — TELEPHONE (OUTPATIENT)
Dept: INTERNAL MEDICINE CLINIC | Facility: CLINIC | Age: 69
End: 2024-06-06

## 2024-06-06 DIAGNOSIS — E11.65 TYPE 2 DIABETES MELLITUS WITH HYPERGLYCEMIA, WITHOUT LONG-TERM CURRENT USE OF INSULIN (HCC): Primary | ICD-10-CM

## 2024-06-06 NOTE — TELEPHONE ENCOUNTER
----- Message from Lucero Longmarianela Garduno sent at 6/5/2024  3:47 PM EDT -----  Regarding: E[facility] Referral Request  E[facility] Referral Request    Reason for referral request: Lab/Test Order    Specialist/Lab/Test patient is requesting (if known): Blood work for A1C check    Specialist Phone Number (if applicable):    Additional Information Wanted to do a blood work for A1C.   --------------------------------------------------------------------------------------------------------------------------    Relationship to Patient: Spouse/Partner Wife     Call Back Information: OK to leave message on voicemail  Preferred Call Back Number: Phone  [phone]

## 2024-06-10 ENCOUNTER — NURSE ONLY (OUTPATIENT)
Dept: INTERNAL MEDICINE CLINIC | Facility: CLINIC | Age: 69
End: 2024-06-10

## 2024-06-10 DIAGNOSIS — E11.65 TYPE 2 DIABETES MELLITUS WITH HYPERGLYCEMIA, WITHOUT LONG-TERM CURRENT USE OF INSULIN (HCC): ICD-10-CM

## 2024-06-10 LAB
ANION GAP SERPL CALC-SCNC: 9 MMOL/L (ref 9–18)
BUN SERPL-MCNC: 13 MG/DL (ref 8–23)
CALCIUM SERPL-MCNC: 9.4 MG/DL (ref 8.8–10.2)
CHLORIDE SERPL-SCNC: 104 MMOL/L (ref 98–107)
CO2 SERPL-SCNC: 26 MMOL/L (ref 20–28)
CREAT SERPL-MCNC: 1.12 MG/DL (ref 0.8–1.3)
EST. AVERAGE GLUCOSE BLD GHB EST-MCNC: 188 MG/DL
GLUCOSE SERPL-MCNC: 197 MG/DL (ref 70–99)
HBA1C MFR BLD: 8.2 % (ref 0–5.6)
POTASSIUM SERPL-SCNC: 4.5 MMOL/L (ref 3.5–5.1)
SODIUM SERPL-SCNC: 139 MMOL/L (ref 136–145)

## 2024-06-13 NOTE — RESULT ENCOUNTER NOTE
Hgb A1c is higher. It was 8.2.  What are the medications he is taking currently for his diabetes.   [facility] so his [facility] can be adjusted to improve diabetes control?  Al Roberson MD

## 2024-06-14 ENCOUNTER — TELEPHONE (OUTPATIENT)
Dept: INTERNAL MEDICINE CLINIC | Facility: CLINIC | Age: 69
End: 2024-06-14

## 2024-06-14 NOTE — TELEPHONE ENCOUNTER
----- Message from Al Roberson MD sent at 6/13/2024  7:01 PM EDT -----  Hgb A1c is higher. It was 8.2.  What are the medications he is taking currently for his diabetes.   Is he going to [facility] this [facility] so his medications can be adjusted to improve diabetes control?  lA Roberson MD

## 2024-07-01 ENCOUNTER — OFFICE VISIT (OUTPATIENT)
Age: 69
End: 2024-07-01
Payer: MEDICARE

## 2024-07-01 VITALS
BODY MASS INDEX: 27.43 KG/M2 | DIASTOLIC BLOOD PRESSURE: 58 MMHG | SYSTOLIC BLOOD PRESSURE: 92 MMHG | HEIGHT: 73 IN | HEART RATE: 72 BPM | WEIGHT: 207 LBS

## 2024-07-01 DIAGNOSIS — R06.02 SHORTNESS OF BREATH: ICD-10-CM

## 2024-07-01 DIAGNOSIS — I25.10 CORONARY ARTERY DISEASE INVOLVING NATIVE CORONARY ARTERY OF NATIVE HEART WITHOUT ANGINA PECTORIS: Primary | ICD-10-CM

## 2024-07-01 DIAGNOSIS — E11.9 TYPE 2 DIABETES MELLITUS WITHOUT COMPLICATION, WITHOUT LONG-TERM CURRENT USE OF INSULIN (HCC): ICD-10-CM

## 2024-07-01 DIAGNOSIS — I10 ESSENTIAL HYPERTENSION: ICD-10-CM

## 2024-07-01 PROCEDURE — 3078F DIAST BP <80 MM HG: CPT | Performed by: INTERNAL MEDICINE

## 2024-07-01 PROCEDURE — 99215 OFFICE O/P EST HI 40 MIN: CPT | Performed by: INTERNAL MEDICINE

## 2024-07-01 PROCEDURE — 3052F HG A1C>EQUAL 8.0%<EQUAL 9.0%: CPT | Performed by: INTERNAL MEDICINE

## 2024-07-01 PROCEDURE — 1123F ACP DISCUSS/DSCN MKR DOCD: CPT | Performed by: INTERNAL MEDICINE

## 2024-07-01 PROCEDURE — 3074F SYST BP LT 130 MM HG: CPT | Performed by: INTERNAL MEDICINE

## 2024-07-01 RX ORDER — NITROGLYCERIN 0.4 MG/1
0.4 TABLET SUBLINGUAL EVERY 5 MIN PRN
Qty: 25 TABLET | Refills: 1 | Status: SHIPPED | OUTPATIENT
Start: 2024-07-01

## 2024-07-01 NOTE — PROGRESS NOTES
[facility] CARDIOLOGY, [address], [address]  [place] [address]  PHONE: [phone]    SUBJECTIVE: /HPI  Emanuel Hernandez (1955) is a 69 y.o. male seen for a follow up visit regarding the following:   Specialty Problems          Cardiology Problems    Mixed hyperlipidemia        CAD (coronary artery disease)         Pt doing well. No chest pain. No palpitations. Patient denies syncope. No dyspnea. States they are taking meds. Maintains a normal level of activity for them without symptoms. No dizziness or lightheadedness. All above conditions stable.  Gets fatigued and tired very quickly even with minimal activity. This is a new problem      Past Medical History, Past Surgical History, Family history, Social History, and Medications were all reviewed with the patient today and updated as necessary.    Allergies   Allergen Reactions    Ticagrelor Other (See Comments)     Chest pain     Past Medical History:   Diagnosis Date    Acute prostatitis 3/11/2015    Bilateral pulmonary embolism (HCC)     CAD (coronary artery disease)     Calculus of kidney     Diabetes (HCC)     DVT of lower limb, acute (HCC) 6/9/2020    Esophageal reflux 3/11/2015    GERD (gastroesophageal reflux disease)     Hallux valgus (acquired)     Hematuria, unspecified     Impotence of organic origin 3/11/2015    Other and unspecified hyperlipidemia     Primary localized osteoarthrosis, unspecified site      Past Surgical History:   Procedure Laterality Date    CARDIAC CATHETERIZATION  02/18/2019    LV:EF=65%.LVEDP=21.LM:nl.LAD:patent stents.DX 1&Dx 2 are patent.LCX:patent stents.RCA:20-30% mid stenosis.Slow filling c/w endothelial dysfunction described.    CHOLECYSTECTOMY      HERNIA REPAIR      IR TRANSCATH INFUSION THROMB NONCORONARY  6/10/2020    [place] UNLISTED PROCEDURE CARDIAC SURGERY      UROLOGICAL SURGERY      kidney stone removal     Family History   Problem Relation Age of Onset    Cancer Mother         Liver Cancer

## 2024-07-09 LAB
25(OH)D3+25(OH)D2 SERPL-MCNC: 28.9 NG/ML (ref 30–100)
ALBUMIN SERPL-MCNC: 4.4 G/DL (ref 3.8–4.8)
ALP SERPL-CCNC: 58 IU/L (ref 44–121)
ALT SERPL-CCNC: 14 IU/L (ref 0–44)
AST SERPL-CCNC: 14 IU/L (ref 0–40)
BASOPHILS # BLD AUTO: 0.1 X10E3/UL (ref 0–0.2)
BASOPHILS NFR BLD AUTO: 1 %
BILIRUB SERPL-MCNC: 0.7 MG/DL (ref 0–1.2)
BUN SERPL-MCNC: 18 MG/DL (ref 8–27)
BUN/CREAT SERPL: 14 (ref 10–24)
CALCIUM SERPL-MCNC: 9.4 MG/DL (ref 8.6–10.2)
CHLORIDE SERPL-SCNC: 102 MMOL/L (ref 96–106)
CHOLEST SERPL-MCNC: 87 MG/DL (ref 100–199)
CO2 SERPL-SCNC: 23 MMOL/L (ref 20–29)
CREAT SERPL-MCNC: 1.25 MG/DL (ref 0.76–1.27)
EGFRCR SERPLBLD CKD-EPI 2021: 62 ML/MIN/1.73
EOSINOPHIL # BLD AUTO: 0.1 X10E3/UL (ref 0–0.4)
EOSINOPHIL NFR BLD AUTO: 1 %
ERYTHROCYTE [DISTWIDTH] IN BLOOD BY AUTOMATED COUNT: 14 % (ref 11.6–15.4)
GLOBULIN SER CALC-MCNC: 2.4 G/DL (ref 1.5–4.5)
GLUCOSE SERPL-MCNC: 145 MG/DL (ref 70–99)
HBA1C MFR BLD: 7.7 % (ref 4.8–5.6)
HCT VFR BLD AUTO: 54.5 % (ref 37.5–51)
HDLC SERPL-MCNC: 24 MG/DL
HGB BLD-MCNC: 18 G/DL (ref 13–17.7)
IMM GRANULOCYTES # BLD AUTO: 0 X10E3/UL (ref 0–0.1)
IMM GRANULOCYTES NFR BLD AUTO: 0 %
IRON SERPL-MCNC: 100 UG/DL (ref 38–169)
LDLC SERPL CALC-MCNC: 35 MG/DL (ref 0–99)
LDLC SERPL DIRECT ASSAY-MCNC: 41 MG/DL (ref 0–99)
LYMPHOCYTES # BLD AUTO: 2.3 X10E3/UL (ref 0.7–3.1)
LYMPHOCYTES NFR BLD AUTO: 28 %
Lab: ABNORMAL
MCH RBC QN AUTO: 30.4 PG (ref 26.6–33)
MCHC RBC AUTO-ENTMCNC: 33 G/DL (ref 31.5–35.7)
MCV RBC AUTO: 92 FL (ref 79–97)
MONOCYTES # BLD AUTO: 0.7 X10E3/UL (ref 0.1–0.9)
MONOCYTES NFR BLD AUTO: 9 %
NEUTROPHILS # BLD AUTO: 4.9 X10E3/UL (ref 1.4–7)
NEUTROPHILS NFR BLD AUTO: 61 %
PLATELET # BLD AUTO: 224 X10E3/UL (ref 150–450)
POTASSIUM SERPL-SCNC: 4.8 MMOL/L (ref 3.5–5.2)
PROT SERPL-MCNC: 6.8 G/DL (ref 6–8.5)
RBC # BLD AUTO: 5.93 X10E6/UL (ref 4.14–5.8)
SODIUM SERPL-SCNC: 139 MMOL/L (ref 134–144)
SPECIMEN STATUS REPORT: NORMAL
T4 SERPL-MCNC: 7.9 UG/DL (ref 4.5–12)
TRIGL SERPL-MCNC: 165 MG/DL (ref 0–149)
TSH SERPL DL<=0.005 MIU/L-ACNC: 1.17 UIU/ML (ref 0.45–4.5)
VLDLC SERPL CALC-MCNC: 28 MG/DL (ref 5–40)
WBC # BLD AUTO: 8.1 X10E3/UL (ref 3.4–10.8)

## 2024-07-10 ENCOUNTER — TELEPHONE (OUTPATIENT)
Age: 69
End: 2024-07-10

## 2024-07-10 NOTE — TELEPHONE ENCOUNTER
Cardiac Clearance        Physician or Practice Requesting:[facility]   : Mariaa  Contact Phone Number: [phone]  Fax Number: [phone]  Date of Surgery/Procedure: 9/10/2024  Type of Surgery or Procedure: MOHS  Type of Anesthesia: local  Type of Clearance Requested: Cardiac Clearance and Medication Hold  Medication to Hold:Eliquis   Days to Hold: 3 days prior

## 2024-08-15 ENCOUNTER — OFFICE VISIT (OUTPATIENT)
Age: 69
End: 2024-08-15
Payer: MEDICARE

## 2024-08-15 VITALS
WEIGHT: 203 LBS | BODY MASS INDEX: 26.9 KG/M2 | DIASTOLIC BLOOD PRESSURE: 66 MMHG | HEART RATE: 80 BPM | HEIGHT: 73 IN | SYSTOLIC BLOOD PRESSURE: 94 MMHG

## 2024-08-15 DIAGNOSIS — I25.10 CORONARY ARTERY DISEASE INVOLVING NATIVE CORONARY ARTERY OF NATIVE HEART WITHOUT ANGINA PECTORIS: ICD-10-CM

## 2024-08-15 DIAGNOSIS — E11.9 TYPE 2 DIABETES MELLITUS WITHOUT COMPLICATION, WITHOUT LONG-TERM CURRENT USE OF INSULIN (HCC): ICD-10-CM

## 2024-08-15 DIAGNOSIS — I10 ESSENTIAL HYPERTENSION: Primary | ICD-10-CM

## 2024-08-15 DIAGNOSIS — R06.02 SHORTNESS OF BREATH: ICD-10-CM

## 2024-08-15 PROBLEM — R06.00 DYSPNEA: Status: ACTIVE | Noted: 2024-08-15

## 2024-08-15 PROCEDURE — 3074F SYST BP LT 130 MM HG: CPT | Performed by: INTERNAL MEDICINE

## 2024-08-15 PROCEDURE — 3078F DIAST BP <80 MM HG: CPT | Performed by: INTERNAL MEDICINE

## 2024-08-15 PROCEDURE — 3051F HG A1C>EQUAL 7.0%<8.0%: CPT | Performed by: INTERNAL MEDICINE

## 2024-08-15 PROCEDURE — 1123F ACP DISCUSS/DSCN MKR DOCD: CPT | Performed by: INTERNAL MEDICINE

## 2024-08-15 PROCEDURE — 99215 OFFICE O/P EST HI 40 MIN: CPT | Performed by: INTERNAL MEDICINE

## 2024-08-15 RX ORDER — NITROGLYCERIN 0.4 MG/1
0.4 TABLET SUBLINGUAL EVERY 5 MIN PRN
Qty: 25 TABLET | Refills: 1 | Status: SHIPPED | OUTPATIENT
Start: 2024-08-15 | End: 2024-08-15 | Stop reason: SDUPTHER

## 2024-08-15 RX ORDER — NITROGLYCERIN 0.4 MG/1
0.4 TABLET SUBLINGUAL EVERY 5 MIN PRN
Qty: 25 TABLET | Refills: 1 | Status: SHIPPED | OUTPATIENT
Start: 2024-08-15

## 2024-08-15 NOTE — TELEPHONE ENCOUNTER
[facility] [place] [facility] and [facility] For NTG for this patient.    [facility]  [facility]  Please resend.

## 2024-08-15 NOTE — PROGRESS NOTES
1993     Years since quittin.6    Smokeless tobacco: Former   Substance Use Topics    Alcohol use: No       Current Outpatient Medications:     nitroGLYCERIN (NITROSTAT) 0.4 MG SL tablet, Place 1 tablet under the tongue every 5 minutes as needed for Chest pain, Disp: 25 tablet, Rfl: 1    apixaban (ELIQUIS) 5 MG TABS tablet, Take 1 tablet by mouth 2 times daily, Disp: 180 tablet, Rfl: 3    Empagliflozin-metFORMIN HCl ER 12.5-1000 MG TB24, Take by mouth, Disp: , Rfl:     Tiotropium Bromide Monohydrate (SPIRIVA RESPIMAT IN), Inhale into the lungs, Disp: , Rfl:     Semaglutide, 1 MG/DOSE, 4 MG/3ML SOPN, Inject 1 mg into the skin every 7 days, Disp: , Rfl:     acetaminophen (TYLENOL) 325 MG tablet, Take 2 tablets by mouth every 6 hours as needed, Disp: , Rfl:     aspirin 81 MG chewable tablet, Take 1 tablet by mouth daily, Disp: , Rfl:     pantoprazole (PROTONIX) 40 MG tablet, Take 1 tablet by mouth daily, Disp: , Rfl:     rosuvastatin (CRESTOR) 20 MG tablet, Take 1 tablet by mouth, Disp: , Rfl:     ROS:  Review of Systems - General ROS: negative for - chills, fatigue or fever  Hematological and Lymphatic ROS: negative for - bleeding problems, bruising or jaundice  Respiratory ROS: positive for - shortness of breath  Cardiovascular ROS: positive for - chest pain and dyspnea on exertion  Gastrointestinal ROS: no abdominal pain, change in bowel habits, or black or bloody stools  Neurological ROS: no TIA or stroke symptoms  All other systems negative.    Wt Readings from Last 3 Encounters:   08/15/24 92.1 kg (203 lb)   24 93.9 kg (207 lb)   24 93.9 kg (207 lb)     Temp Readings from Last 3 Encounters:   24 97 °F (36.1 °C) (Temporal)   23 97 °F (36.1 °C) (Temporal)   23 97.6 °F (36.4 °C) (Temporal)     BP Readings from Last 3 Encounters:   08/15/24 94/66   24 105/68   24 122/72     Pulse Readings from Last 3 Encounters:   08/15/24 80   24 89   24 81

## 2024-08-19 DIAGNOSIS — I25.10 CORONARY ARTERY DISEASE INVOLVING NATIVE CORONARY ARTERY OF NATIVE HEART WITHOUT ANGINA PECTORIS: ICD-10-CM

## 2024-08-19 DIAGNOSIS — E11.9 TYPE 2 DIABETES MELLITUS WITHOUT COMPLICATION, WITHOUT LONG-TERM CURRENT USE OF INSULIN (HCC): ICD-10-CM

## 2024-08-19 DIAGNOSIS — I10 ESSENTIAL HYPERTENSION: ICD-10-CM

## 2024-08-19 DIAGNOSIS — R06.02 SHORTNESS OF BREATH: ICD-10-CM

## 2024-08-19 LAB
25(OH)D3 SERPL-MCNC: 23.6 NG/ML (ref 30–100)
ALBUMIN SERPL-MCNC: 4 G/DL (ref 3.2–4.6)
ALBUMIN/GLOB SERPL: 1.5 (ref 1–1.9)
ALP SERPL-CCNC: 51 U/L (ref 40–129)
ALT SERPL-CCNC: 14 U/L (ref 12–65)
ANION GAP SERPL CALC-SCNC: 12 MMOL/L (ref 9–18)
AST SERPL-CCNC: 17 U/L (ref 15–37)
BASOPHILS # BLD: 0.1 K/UL (ref 0–0.2)
BASOPHILS NFR BLD: 1 % (ref 0–2)
BILIRUB SERPL-MCNC: 0.4 MG/DL (ref 0–1.2)
BUN SERPL-MCNC: 12 MG/DL (ref 8–23)
CALCIUM SERPL-MCNC: 9 MG/DL (ref 8.8–10.2)
CHLORIDE SERPL-SCNC: 106 MMOL/L (ref 98–107)
CO2 SERPL-SCNC: 22 MMOL/L (ref 20–28)
CREAT SERPL-MCNC: 1.06 MG/DL (ref 0.8–1.3)
DIFFERENTIAL METHOD BLD: ABNORMAL
EOSINOPHIL # BLD: 0.2 K/UL (ref 0–0.8)
EOSINOPHIL NFR BLD: 2 % (ref 0.5–7.8)
ERYTHROCYTE [DISTWIDTH] IN BLOOD BY AUTOMATED COUNT: 14.6 % (ref 11.9–14.6)
EST. AVERAGE GLUCOSE BLD GHB EST-MCNC: 169 MG/DL
GLOBULIN SER CALC-MCNC: 2.7 G/DL (ref 2.3–3.5)
GLUCOSE SERPL-MCNC: 177 MG/DL (ref 70–99)
HBA1C MFR BLD: 7.5 % (ref 0–5.6)
HCT VFR BLD AUTO: 53.1 % (ref 41.1–50.3)
HGB BLD-MCNC: 17.9 G/DL (ref 13.6–17.2)
IMM GRANULOCYTES # BLD AUTO: 0 K/UL (ref 0–0.5)
IMM GRANULOCYTES NFR BLD AUTO: 0 % (ref 0–5)
IRON SERPL-MCNC: 62 UG/DL (ref 35–100)
LDLC SERPL DIRECT ASSAY-MCNC: 44 MG/DL (ref 0–100)
LYMPHOCYTES # BLD: 2.2 K/UL (ref 0.5–4.6)
LYMPHOCYTES NFR BLD: 28 % (ref 13–44)
MCH RBC QN AUTO: 30.6 PG (ref 26.1–32.9)
MCHC RBC AUTO-ENTMCNC: 33.7 G/DL (ref 31.4–35)
MCV RBC AUTO: 90.8 FL (ref 82–102)
MONOCYTES # BLD: 0.7 K/UL (ref 0.1–1.3)
MONOCYTES NFR BLD: 9 % (ref 4–12)
NEUTS SEG # BLD: 4.9 K/UL (ref 1.7–8.2)
NEUTS SEG NFR BLD: 60 % (ref 43–78)
NRBC # BLD: 0 K/UL (ref 0–0.2)
PLATELET # BLD AUTO: 208 K/UL (ref 150–450)
PMV BLD AUTO: 10.5 FL (ref 9.4–12.3)
POTASSIUM SERPL-SCNC: 4.2 MMOL/L (ref 3.5–5.1)
PROT SERPL-MCNC: 6.6 G/DL (ref 6.3–8.2)
RBC # BLD AUTO: 5.85 M/UL (ref 4.23–5.6)
SODIUM SERPL-SCNC: 140 MMOL/L (ref 136–145)
T4 FREE SERPL-MCNC: 1.1 NG/DL (ref 0.9–1.7)
WBC # BLD AUTO: 8 K/UL (ref 4.3–11.1)

## 2024-08-21 LAB
APO B SERPL-MCNC: 61 MG/DL
LPA SERPL-SCNC: 40.6 NMOL/L

## 2024-08-21 NOTE — PROGRESS NOTES
Patient pre-assessment complete for Emanuel Hernandez scheduled for [facility], arrival time 0900 am. Patient verified using . Patient instructed to bring a list of all home medications on the day of procedure. NPO status reinforced. Patient informed to take a full dose aspirin 325mg  or 81 mg x 4 on the day of procedure. Patient instructed to HOLD Eliquis and diabetic medications . Instructed they can take all other medications excluding vitamins & supplements. Patient verbalizes understanding of all instructions & denies any questions at this time.

## 2024-08-22 ENCOUNTER — HOSPITAL ENCOUNTER (OUTPATIENT)
Age: 69
Setting detail: OUTPATIENT SURGERY
Discharge: HOME | End: 2024-08-22
Attending: INTERNAL MEDICINE | Admitting: INTERNAL MEDICINE
Payer: MEDICARE

## 2024-08-22 VITALS
SYSTOLIC BLOOD PRESSURE: 124 MMHG | TEMPERATURE: 97.9 F | RESPIRATION RATE: 18 BRPM | WEIGHT: 203 LBS | HEART RATE: 84 BPM | BODY MASS INDEX: 26.9 KG/M2 | DIASTOLIC BLOOD PRESSURE: 84 MMHG | HEIGHT: 73 IN | OXYGEN SATURATION: 94 %

## 2024-08-22 DIAGNOSIS — R06.00 DYSPNEA: ICD-10-CM

## 2024-08-22 LAB
ECHO BSA: 2.18 M2
EKG ATRIAL RATE: 94 BPM
EKG DIAGNOSIS: NORMAL
EKG P AXIS: 56 DEGREES
EKG P-R INTERVAL: 192 MS
EKG Q-T INTERVAL: 384 MS
EKG QRS DURATION: 92 MS
EKG QTC CALCULATION (BAZETT): 480 MS
EKG R AXIS: -46 DEGREES
EKG T AXIS: 47 DEGREES
EKG VENTRICULAR RATE: 94 BPM
GLUCOSE BLD STRIP.AUTO-MCNC: 157 MG/DL (ref 65–100)
SERVICE CMNT-IMP: ABNORMAL

## 2024-08-22 PROCEDURE — 99152 MOD SED SAME PHYS/QHP 5/>YRS: CPT | Performed by: INTERNAL MEDICINE

## 2024-08-22 PROCEDURE — 6360000002 HC RX W HCPCS: Performed by: INTERNAL MEDICINE

## 2024-08-22 PROCEDURE — 93005 ELECTROCARDIOGRAM TRACING: CPT | Performed by: INTERNAL MEDICINE

## 2024-08-22 PROCEDURE — 2500000003 HC RX 250 WO HCPCS: Performed by: INTERNAL MEDICINE

## 2024-08-22 PROCEDURE — C1769 GUIDE WIRE: HCPCS | Performed by: INTERNAL MEDICINE

## 2024-08-22 PROCEDURE — C1894 INTRO/SHEATH, NON-LASER: HCPCS | Performed by: INTERNAL MEDICINE

## 2024-08-22 PROCEDURE — 93458 L HRT ARTERY/VENTRICLE ANGIO: CPT | Performed by: INTERNAL MEDICINE

## 2024-08-22 PROCEDURE — 82962 GLUCOSE BLOOD TEST: CPT

## 2024-08-22 PROCEDURE — 2709999900 HC NON-CHARGEABLE SUPPLY: Performed by: INTERNAL MEDICINE

## 2024-08-22 PROCEDURE — 6360000004 HC RX CONTRAST MEDICATION: Performed by: INTERNAL MEDICINE

## 2024-08-22 PROCEDURE — 93010 ELECTROCARDIOGRAM REPORT: CPT | Performed by: INTERNAL MEDICINE

## 2024-08-22 RX ORDER — IOPAMIDOL 755 MG/ML
INJECTION, SOLUTION INTRAVASCULAR PRN
Status: DISCONTINUED | OUTPATIENT
Start: 2024-08-22 | End: 2024-08-22 | Stop reason: HOSPADM

## 2024-08-22 RX ORDER — MIDAZOLAM HYDROCHLORIDE 1 MG/ML
INJECTION INTRAMUSCULAR; INTRAVENOUS PRN
Status: DISCONTINUED | OUTPATIENT
Start: 2024-08-22 | End: 2024-08-22 | Stop reason: HOSPADM

## 2024-08-22 RX ORDER — AMLODIPINE BESYLATE 5 MG/1
5 TABLET ORAL DAILY
Qty: 30 TABLET | Refills: 3 | Status: SHIPPED | OUTPATIENT
Start: 2024-08-22

## 2024-08-22 RX ORDER — LIDOCAINE HYDROCHLORIDE 10 MG/ML
INJECTION, SOLUTION INFILTRATION; PERINEURAL PRN
Status: DISCONTINUED | OUTPATIENT
Start: 2024-08-22 | End: 2024-08-22 | Stop reason: HOSPADM

## 2024-08-22 RX ORDER — HEPARIN SODIUM 200 [USP'U]/100ML
INJECTION, SOLUTION INTRAVENOUS CONTINUOUS PRN
Status: COMPLETED | OUTPATIENT
Start: 2024-08-22 | End: 2024-08-22

## 2024-08-22 RX ORDER — NITROGLYCERIN 20 MG/100ML
INJECTION INTRAVENOUS PRN
Status: DISCONTINUED | OUTPATIENT
Start: 2024-08-22 | End: 2024-08-22 | Stop reason: HOSPADM

## 2024-08-22 RX ORDER — ASPIRIN 81 MG/1
324 TABLET, CHEWABLE ORAL ONCE
Status: DISCONTINUED | OUTPATIENT
Start: 2024-08-22 | End: 2024-08-22 | Stop reason: HOSPADM

## 2024-08-22 RX ORDER — SODIUM CHLORIDE 9 MG/ML
INJECTION, SOLUTION INTRAVENOUS CONTINUOUS
Status: DISCONTINUED | OUTPATIENT
Start: 2024-08-22 | End: 2024-08-22 | Stop reason: HOSPADM

## 2024-08-22 NOTE — PROGRESS NOTES
Discharge instructions given per orders, voiced good understanding of post LHC care, medications & follow up care. Denies any questions    R rad site C/D/I.

## 2024-08-22 NOTE — DISCHARGE INSTRUCTIONS
HEART CATHETERIZATION/ANGIOGRAPHY DISCHARGE INSTRUCTIONS    Check puncture site frequently for swelling or bleeding. If there is any bleeding, apply pressure over the area with a clean towel or washcloth and call 911. Notify your doctor for any redness, swelling, drainage, or oozing from the puncture site. Notify your doctor for any fever or chills.  If the extremity becomes cold, numb, or painful call [facility] Cardiology @ [phone].  Activity should be limited for the next 48 hours. No heavy lifting, pushing, pulling  or strenuous activity for 48 hours. No heavy lifting (anything over 10 pounds) for 3 days.  You may resume your usual diet. Drink more fluids than usual.  Have a responsible person drive you home and stay with you for at least 24 hours after your heart catheterization/angiography.  You may remove bandage from your R radial (wrist) in 24 hours. You may shower in 24 hours. No tub baths, hot tubs, or swimming for 1 week. Do not place any lotions, creams, powders, or ointments over puncture site for 1 week. You may place a clean band-aid over the puncture site each day for 5 days. Change daily.        Sedation for a Medical Procedure: Care Instructions     You were given a sedative medication during your visit. While many of the effects will have worn   off before you leave; you may continue to feel some effects for several hours.      Common side effects from sedation include:  Feeling sleepy. (Your doctors and nurses will make sure you are not too sleepy to go home.)  Nausea and vomiting. This usually does not last long.  Feeling tired.     How can you care for yourself at home?  Activity    Don't do anything for 24 hours that requires attention to detail. It takes time for the medicine effects to completely wear off.     Do not make important legal decisions for 24 hours.     Do not sign any legal documents for 24 hours.     Do not drink alcohol today     For your safety, you should not drive or

## 2024-08-22 NOTE — PROGRESS NOTES
Report received from [facility] RN. Procedural finding communicated. Intra procedural medication administration reviewed. Progression of care discussed.    Patient received into CPRU room 8, Post [facility] w/ Dr Lara    Access site without bleeding or swelling. None noted    Patient instructed to limit movement of R upper extremity.    Routine post procedural vital signs & site assessment initiated.

## 2024-08-27 ENCOUNTER — OFFICE VISIT (OUTPATIENT)
Dept: INTERNAL MEDICINE CLINIC | Facility: CLINIC | Age: 69
End: 2024-08-27
Payer: MEDICARE

## 2024-08-27 VITALS
TEMPERATURE: 97.5 F | DIASTOLIC BLOOD PRESSURE: 68 MMHG | SYSTOLIC BLOOD PRESSURE: 101 MMHG | HEIGHT: 73 IN | WEIGHT: 202 LBS | HEART RATE: 100 BPM | OXYGEN SATURATION: 95 % | BODY MASS INDEX: 26.77 KG/M2

## 2024-08-27 DIAGNOSIS — E11.65 TYPE 2 DIABETES MELLITUS WITH HYPERGLYCEMIA, WITHOUT LONG-TERM CURRENT USE OF INSULIN (HCC): Primary | ICD-10-CM

## 2024-08-27 DIAGNOSIS — I25.118 CORONARY ARTERY DISEASE OF NATIVE ARTERY OF NATIVE HEART WITH STABLE ANGINA PECTORIS (HCC): ICD-10-CM

## 2024-08-27 DIAGNOSIS — R14.0 ABDOMINAL BLOATING: ICD-10-CM

## 2024-08-27 DIAGNOSIS — K21.9 GASTROESOPHAGEAL REFLUX DISEASE WITHOUT ESOPHAGITIS: ICD-10-CM

## 2024-08-27 DIAGNOSIS — E55.9 VITAMIN D DEFICIENCY: ICD-10-CM

## 2024-08-27 PROCEDURE — 99214 OFFICE O/P EST MOD 30 MIN: CPT | Performed by: INTERNAL MEDICINE

## 2024-08-27 PROCEDURE — 1123F ACP DISCUSS/DSCN MKR DOCD: CPT | Performed by: INTERNAL MEDICINE

## 2024-08-27 PROCEDURE — 3051F HG A1C>EQUAL 7.0%<8.0%: CPT | Performed by: INTERNAL MEDICINE

## 2024-08-27 RX ORDER — MULTIVIT-MIN/IRON/FOLIC ACID/K 18-600-40
1 CAPSULE ORAL DAILY
COMMUNITY
Start: 2024-08-27

## 2024-08-27 SDOH — ECONOMIC STABILITY: INCOME INSECURITY: HOW HARD IS IT FOR YOU TO PAY FOR THE VERY BASICS LIKE FOOD, HOUSING, MEDICAL CARE, AND HEATING?: NOT VERY HARD

## 2024-08-27 SDOH — ECONOMIC STABILITY: FOOD INSECURITY: WITHIN THE PAST 12 MONTHS, YOU WORRIED THAT YOUR FOOD WOULD RUN OUT BEFORE YOU GOT MONEY TO BUY MORE.: NEVER TRUE

## 2024-08-27 SDOH — ECONOMIC STABILITY: FOOD INSECURITY: WITHIN THE PAST 12 MONTHS, THE FOOD YOU BOUGHT JUST DIDN'T LAST AND YOU DIDN'T HAVE MONEY TO GET MORE.: NEVER TRUE

## 2024-08-27 ASSESSMENT — PATIENT HEALTH QUESTIONNAIRE - PHQ9
SUM OF ALL RESPONSES TO PHQ QUESTIONS 1-9: 0
SUM OF ALL RESPONSES TO PHQ9 QUESTIONS 1 & 2: 0
2. FEELING DOWN, DEPRESSED OR HOPELESS: NOT AT ALL
SUM OF ALL RESPONSES TO PHQ QUESTIONS 1-9: 0
1. LITTLE INTEREST OR PLEASURE IN DOING THINGS: NOT AT ALL

## 2024-08-27 NOTE — RESULT ENCOUNTER NOTE
Xray he had done were okay    CXR was clear.     Abdominal film did not show significant gas or constipation  Al Roberson MD

## 2024-08-27 NOTE — PROGRESS NOTES
08/27/2024   Location:[facility] PHYSICIAN SERVICES  [facility] INTERNAL MEDICINE  [place]  Patient #:  [id number]  YOB: 1955        History of Present Illness     Chief Complaint   Patient presents with    6 Month Follow-Up     6 month follow-up        Mr. Hernandez is a 69 y.o. male  who presents for Follow up on chronic medical issues. There is compliance and tolerance with medications.    Chronic active medical issues  include  DM, HLD, HTN, and CAD.  [facility]  Reports feeling bloated.   He is on Ozempic along with Jardiance/Metformin combination pill for his diabetes.  HLD treated with Crestor.   Had recent heart cath due to abn nuclear stress test.   1.  Diffuse mild nonobstructive coronary artery disease with previously placed LAD and diagonal stents demonstrating continued patency     2.  Normal ejection fraction EF 60%     3.  Moderate diffuse coronary vasomotor dysfunction which is likely the cause of the patient's symptoms.    Norvasc was added to regimen.     Last Labs  CBC:   Lab Results   Component Value Date/Time    WBC 8.0 08/19/2024 01:45 PM    RBC 5.85 08/19/2024 01:45 PM    HGB 17.9 08/19/2024 01:45 PM    HCT 53.1 08/19/2024 01:45 PM    MCV 90.8 08/19/2024 01:45 PM    MCH 30.6 08/19/2024 01:45 PM    MCHC 33.7 08/19/2024 01:45 PM    RDW 14.6 08/19/2024 01:45 PM     08/19/2024 01:45 PM    MPV 10.5 08/19/2024 01:45 PM     CMP:    Lab Results   Component Value Date/Time     08/19/2024 01:45 PM    K 4.2 08/19/2024 01:45 PM     08/19/2024 01:45 PM    CO2 22 08/19/2024 01:45 PM    BUN 12 08/19/2024 01:45 PM    CREATININE 1.06 08/19/2024 01:45 PM    GFRAA 78 11/09/2021 08:47 AM    AGRATIO 1.9 11/09/2021 08:47 AM    LABGLOM 75 08/19/2024 01:45 PM    LABGLOM >60 08/16/2023 02:31 PM    LABGLOM 75 03/21/2022 08:43 AM    GLUCOSE 177 08/19/2024 01:45 PM    GLUCOSE 145 07/08/2024 10:01 AM    CALCIUM 9.0 08/19/2024 01:45 PM    BILITOT 0.4 08/19/2024

## 2024-08-28 ENCOUNTER — TELEPHONE (OUTPATIENT)
Dept: INTERNAL MEDICINE CLINIC | Facility: CLINIC | Age: 69
End: 2024-08-28

## 2024-08-28 NOTE — TELEPHONE ENCOUNTER
----- Message from Dr. Al Roberson MD sent at 8/27/2024  6:31 PM EDT -----  Xray he had done were okay    CXR was clear.     Abdominal film did not show significant gas or constipation  Al Roberson MD

## 2024-10-09 ENCOUNTER — HOSPITAL ENCOUNTER (OUTPATIENT)
Dept: SLEEP CENTER | Age: 69
Discharge: HOME | End: 2024-10-12

## 2024-10-29 ENCOUNTER — TELEPHONE (OUTPATIENT)
Dept: SLEEP MEDICINE | Age: 69
End: 2024-10-29

## 2024-11-06 NOTE — PROGRESS NOTES
II.    NECK/LYMPHATIC:   Symmetrical with no elevation of jugular venous pulsation.  Trachea midline. No thyroid enlargement.  No cervical adenopathy.   LUNGS:   Normal respiratory effort with symmetrical lung expansion.   Breath sounds clear.   HEART:   There is a regular rate and rhythm.  No murmur, rub, or gallop.  There is no edema in the lower extremities.   ABDOMEN:   Soft and non-tender.  No hepatosplenomegaly.  Bowel sounds are normal.     NEURO:   The patient is alert and oriented to person, place, and time.  Memory appears intact and mood is normal.  No gross sensorimotor deficits are present.          ASSESSMENT:  (Medical Decision Making)      Diagnosis Orders   1. ROSLYN (obstructive sleep apnea) -The pathophysiology of obstructive sleep apnea was reviewed with the patient.  It's potential to promote severe neurologic, cardiac, pulmonary, and gastrointestinal problems was discussed. Specifically, the increased incidence of hypertension, coronary artery disease, congestive heart failure, pulmonary hypertension, gastroesophageal reflux, pathologic hypersomnolence, memory loss, and glucose intolerance was related to the consequences of hypoxemia, hypercapnia, airway obstruction, and sympathetic overdrive.  We also discussed the ability of nasal CPAP to correct these abnormalities through maintenance of a patent airway.     Pt to try positional therapy. I will check an MAYTE on RA to ensure adequate oxygenation. If his O2 sats are not adequate, we will need to start CPAP and pt is aware of this.  Pulse oximetry, overnight      2. Nocturnal hypoxemia -pt to utilize positional therapy and check MAYTE on RA Pulse oximetry, overnight           PLAN:  Pt to try positional therapy  Check MAYTE   Follow up to be determined after MAYTE     Orders Placed This Encounter   Procedures    Pulse oximetry, overnight     Scheduling Instructions:      Please send out Overnight Oximetry on Room Air      Please Fax results to:

## 2024-11-07 ENCOUNTER — OFFICE VISIT (OUTPATIENT)
Dept: SLEEP MEDICINE | Age: 69
End: 2024-11-07

## 2024-11-07 VITALS
TEMPERATURE: 97.4 F | BODY MASS INDEX: 26.51 KG/M2 | RESPIRATION RATE: 19 BRPM | DIASTOLIC BLOOD PRESSURE: 75 MMHG | HEIGHT: 73 IN | HEART RATE: 79 BPM | SYSTOLIC BLOOD PRESSURE: 117 MMHG | OXYGEN SATURATION: 93 % | WEIGHT: 200 LBS

## 2024-11-07 DIAGNOSIS — G47.34 NOCTURNAL HYPOXEMIA: ICD-10-CM

## 2024-11-07 DIAGNOSIS — G47.33 OSA (OBSTRUCTIVE SLEEP APNEA): Primary | ICD-10-CM

## 2024-11-07 ASSESSMENT — SLEEP AND FATIGUE QUESTIONNAIRES
HOW LIKELY ARE YOU TO NOD OFF OR FALL ASLEEP WHEN YOU ARE A PASSENGER IN A CAR FOR AN HOUR WITHOUT A BREAK: SLIGHT CHANCE OF DOZING
ESS TOTAL SCORE: 11
HOW LIKELY ARE YOU TO NOD OFF OR FALL ASLEEP WHILE SITTING INACTIVE IN A PUBLIC PLACE: SLIGHT CHANCE OF DOZING
HOW LIKELY ARE YOU TO NOD OFF OR FALL ASLEEP WHILE WATCHING TV: HIGH CHANCE OF DOZING
HOW LIKELY ARE YOU TO NOD OFF OR FALL ASLEEP WHILE SITTING QUIETLY AFTER LUNCH WITHOUT ALCOHOL: SLIGHT CHANCE OF DOZING
HOW LIKELY ARE YOU TO NOD OFF OR FALL ASLEEP WHILE SITTING AND TALKING TO SOMEONE: WOULD NEVER DOZE
HOW LIKELY ARE YOU TO NOD OFF OR FALL ASLEEP WHILE LYING DOWN TO REST IN THE AFTERNOON WHEN CIRCUMSTANCES PERMIT: HIGH CHANCE OF DOZING
HOW LIKELY ARE YOU TO NOD OFF OR FALL ASLEEP IN A CAR, WHILE STOPPED FOR A FEW MINUTES IN TRAFFIC: WOULD NEVER DOZE
HOW LIKELY ARE YOU TO NOD OFF OR FALL ASLEEP WHILE SITTING AND READING: MODERATE CHANCE OF DOZING

## 2024-11-07 NOTE — PATIENT INSTRUCTIONS
Positional therapy was recommended.      Some people have sleep apnea primarily when sleeping on their back. This is called the “supine” position. Their breathing returns to normal when they sleep on their side. Positional therapy may involve wearing a special device around your waist or back. It keeps you sleeping in the side position.     A tennis ball or ping pong ball placed in a sew-in pocket on the back of a t-shirt is helpful to assist with sleeping on your right or left side.           The Zzoma device is an assistive device used for positional therapy.       Wedge pillows can also be useful.       The company who will be taking care of your  oxygen testing  supplies is:    Address: [address] [place] [address] [address], [place] [address]  Phone: [phone] Option #1  Fax: [phone]

## 2024-12-16 ENCOUNTER — OFFICE VISIT (OUTPATIENT)
Age: 69
End: 2024-12-16
Payer: MEDICARE

## 2024-12-16 VITALS
BODY MASS INDEX: 26.11 KG/M2 | DIASTOLIC BLOOD PRESSURE: 78 MMHG | SYSTOLIC BLOOD PRESSURE: 113 MMHG | HEIGHT: 73 IN | HEART RATE: 98 BPM | WEIGHT: 197 LBS

## 2024-12-16 DIAGNOSIS — I10 ESSENTIAL HYPERTENSION: Primary | ICD-10-CM

## 2024-12-16 DIAGNOSIS — E78.5 DYSLIPIDEMIA: ICD-10-CM

## 2024-12-16 DIAGNOSIS — E11.9 TYPE 2 DIABETES MELLITUS WITHOUT COMPLICATION, WITHOUT LONG-TERM CURRENT USE OF INSULIN (HCC): ICD-10-CM

## 2024-12-16 DIAGNOSIS — I25.10 CORONARY ARTERY DISEASE INVOLVING NATIVE CORONARY ARTERY OF NATIVE HEART WITHOUT ANGINA PECTORIS: ICD-10-CM

## 2024-12-16 PROCEDURE — 1159F MED LIST DOCD IN RCRD: CPT | Performed by: INTERNAL MEDICINE

## 2024-12-16 PROCEDURE — 3051F HG A1C>EQUAL 7.0%<8.0%: CPT | Performed by: INTERNAL MEDICINE

## 2024-12-16 PROCEDURE — 99214 OFFICE O/P EST MOD 30 MIN: CPT | Performed by: INTERNAL MEDICINE

## 2024-12-16 PROCEDURE — 3074F SYST BP LT 130 MM HG: CPT | Performed by: INTERNAL MEDICINE

## 2024-12-16 PROCEDURE — 1126F AMNT PAIN NOTED NONE PRSNT: CPT | Performed by: INTERNAL MEDICINE

## 2024-12-16 PROCEDURE — 3078F DIAST BP <80 MM HG: CPT | Performed by: INTERNAL MEDICINE

## 2024-12-16 PROCEDURE — 1123F ACP DISCUSS/DSCN MKR DOCD: CPT | Performed by: INTERNAL MEDICINE

## 2024-12-16 RX ORDER — NITROGLYCERIN 0.4 MG/1
0.4 TABLET SUBLINGUAL EVERY 5 MIN PRN
Qty: 25 TABLET | Refills: 1 | Status: SHIPPED | OUTPATIENT
Start: 2024-12-16 | End: 2024-12-16 | Stop reason: SDUPTHER

## 2024-12-16 RX ORDER — AMLODIPINE BESYLATE 5 MG/1
5 TABLET ORAL DAILY
Qty: 90 TABLET | Refills: 3 | Status: SHIPPED | OUTPATIENT
Start: 2024-12-16 | End: 2024-12-16 | Stop reason: SDUPTHER

## 2024-12-16 RX ORDER — NITROGLYCERIN 0.4 MG/1
0.4 TABLET SUBLINGUAL EVERY 5 MIN PRN
Qty: 25 TABLET | Refills: 1 | Status: SHIPPED | OUTPATIENT
Start: 2024-12-16

## 2024-12-16 RX ORDER — AMLODIPINE BESYLATE 5 MG/1
5 TABLET ORAL DAILY
Qty: 90 TABLET | Refills: 3 | Status: SHIPPED | OUTPATIENT
Start: 2024-12-16

## 2024-12-16 NOTE — TELEPHONE ENCOUNTER
Would like to verify where his medication were being sent after his appt today. [facility] uses [facility].

## 2024-12-16 NOTE — PROGRESS NOTES
plan      Continue meds as below    Current Outpatient Medications:     Cholecalciferol (VITAMIN D) 50 MCG (2000 UT) CAPS capsule, Take 1 capsule by mouth daily, Disp: , Rfl:     amLODIPine (NORVASC) 5 MG tablet, Take 1 tablet by mouth daily, Disp: 30 tablet, Rfl: 3    nitroGLYCERIN (NITROSTAT) 0.4 MG SL tablet, Place 1 tablet under the tongue every 5 minutes as needed for Chest pain, Disp: 25 tablet, Rfl: 1    apixaban (ELIQUIS) 5 MG TABS tablet, Take 1 tablet by mouth 2 times daily, Disp: 180 tablet, Rfl: 3    Empagliflozin-metFORMIN HCl ER 12.5-1000 MG TB24, Take by mouth, Disp: , Rfl:     Tiotropium Bromide Monohydrate (SPIRIVA RESPIMAT IN), Inhale into the lungs, Disp: , Rfl:     Semaglutide, 1 MG/DOSE, 4 MG/3ML SOPN, Inject 1 mg into the skin every 7 days, Disp: , Rfl:     acetaminophen (TYLENOL) 325 MG tablet, Take 2 tablets by mouth every 6 hours as needed, Disp: , Rfl:     aspirin 81 MG chewable tablet, Take 1 tablet by mouth daily, Disp: , Rfl:     pantoprazole (PROTONIX) 40 MG tablet, Take 1 tablet by mouth daily, Disp: , Rfl:     rosuvastatin (CRESTOR) 20 MG tablet, Take 1 tablet by mouth, Disp: , Rfl:     VERNON CERON MD  12/16/2024  7:54 AM    Appropriate evaluation of guideline directed medical therapy for the patient's conditions have been reviewed.  If appropriate, adjustment of therapy for underlying cardiac issues has been offered.  If patient wishes for adjustment of therapy which would include intensification of pharmacologic therapy for any above conditions this will be sent to appropriate pharmacy.  If patient politely declines any further changes they will be encouraged to continue with current treatment and appropriate risk factor modification and healthy lifestyle.      Pt is instructed to follow all appropriate cardiac risk factor recommendations and to be compliant with meds and testing. Instructed to follow up appropriately and seek urgent medical care if acute or unstable issues

## 2025-02-27 ENCOUNTER — OFFICE VISIT (OUTPATIENT)
Dept: INTERNAL MEDICINE CLINIC | Facility: CLINIC | Age: 70
End: 2025-02-27
Payer: MEDICARE

## 2025-02-27 VITALS
DIASTOLIC BLOOD PRESSURE: 70 MMHG | HEIGHT: 73 IN | HEART RATE: 68 BPM | OXYGEN SATURATION: 97 % | SYSTOLIC BLOOD PRESSURE: 114 MMHG | TEMPERATURE: 97.6 F | WEIGHT: 199 LBS | BODY MASS INDEX: 26.37 KG/M2

## 2025-02-27 DIAGNOSIS — I25.118 CORONARY ARTERY DISEASE OF NATIVE ARTERY OF NATIVE HEART WITH STABLE ANGINA PECTORIS: ICD-10-CM

## 2025-02-27 DIAGNOSIS — E78.2 MIXED HYPERLIPIDEMIA: ICD-10-CM

## 2025-02-27 DIAGNOSIS — E11.65 TYPE 2 DIABETES MELLITUS WITH HYPERGLYCEMIA, WITHOUT LONG-TERM CURRENT USE OF INSULIN (HCC): Primary | ICD-10-CM

## 2025-02-27 DIAGNOSIS — K21.9 GASTROESOPHAGEAL REFLUX DISEASE WITHOUT ESOPHAGITIS: ICD-10-CM

## 2025-02-27 DIAGNOSIS — E11.65 TYPE 2 DIABETES MELLITUS WITH HYPERGLYCEMIA, WITHOUT LONG-TERM CURRENT USE OF INSULIN (HCC): ICD-10-CM

## 2025-02-27 DIAGNOSIS — D75.1 POLYCYTHEMIA: ICD-10-CM

## 2025-02-27 LAB
ALBUMIN SERPL-MCNC: 4.1 G/DL (ref 3.2–4.6)
ALBUMIN/GLOB SERPL: 1.4 (ref 1–1.9)
ALP SERPL-CCNC: 53 U/L (ref 40–129)
ALT SERPL-CCNC: 20 U/L (ref 8–55)
ANION GAP SERPL CALC-SCNC: 10 MMOL/L (ref 7–16)
AST SERPL-CCNC: 20 U/L (ref 15–37)
BASOPHILS # BLD: 0.06 K/UL (ref 0–0.2)
BASOPHILS NFR BLD: 0.9 % (ref 0–2)
BILIRUB SERPL-MCNC: 0.5 MG/DL (ref 0–1.2)
BUN SERPL-MCNC: 13 MG/DL (ref 8–23)
CALCIUM SERPL-MCNC: 9.3 MG/DL (ref 8.8–10.2)
CHLORIDE SERPL-SCNC: 106 MMOL/L (ref 98–107)
CHOLEST SERPL-MCNC: 75 MG/DL (ref 0–200)
CO2 SERPL-SCNC: 25 MMOL/L (ref 20–29)
CREAT SERPL-MCNC: 1.08 MG/DL (ref 0.8–1.3)
DIFFERENTIAL METHOD BLD: ABNORMAL
EOSINOPHIL # BLD: 0.1 K/UL (ref 0–0.8)
EOSINOPHIL NFR BLD: 1.5 % (ref 0.5–7.8)
ERYTHROCYTE [DISTWIDTH] IN BLOOD BY AUTOMATED COUNT: 14.6 % (ref 11.9–14.6)
EST. AVERAGE GLUCOSE BLD GHB EST-MCNC: 184 MG/DL
GLOBULIN SER CALC-MCNC: 3 G/DL (ref 2.3–3.5)
GLUCOSE SERPL-MCNC: 166 MG/DL (ref 70–99)
HBA1C MFR BLD: 8.1 % (ref 0–5.6)
HCT VFR BLD AUTO: 53.5 % (ref 41.1–50.3)
HDLC SERPL-MCNC: 24 MG/DL (ref 40–60)
HDLC SERPL: 3.1 (ref 0–5)
HGB BLD-MCNC: 17.5 G/DL (ref 13.6–17.2)
IMM GRANULOCYTES # BLD AUTO: 0.02 K/UL (ref 0–0.5)
IMM GRANULOCYTES NFR BLD AUTO: 0.3 % (ref 0–5)
LDLC SERPL CALC-MCNC: 30 MG/DL (ref 0–100)
LYMPHOCYTES # BLD: 2.48 K/UL (ref 0.5–4.6)
LYMPHOCYTES NFR BLD: 38.2 % (ref 13–44)
MCH RBC QN AUTO: 30.4 PG (ref 26.1–32.9)
MCHC RBC AUTO-ENTMCNC: 32.7 G/DL (ref 31.4–35)
MCV RBC AUTO: 92.9 FL (ref 82–102)
MONOCYTES # BLD: 0.54 K/UL (ref 0.1–1.3)
MONOCYTES NFR BLD: 8.3 % (ref 4–12)
NEUTS SEG # BLD: 3.29 K/UL (ref 1.7–8.2)
NEUTS SEG NFR BLD: 50.8 % (ref 43–78)
NRBC # BLD: 0 K/UL (ref 0–0.2)
PLATELET # BLD AUTO: 203 K/UL (ref 150–450)
PMV BLD AUTO: 10.6 FL (ref 9.4–12.3)
POTASSIUM SERPL-SCNC: 4.3 MMOL/L (ref 3.5–5.1)
PROT SERPL-MCNC: 7.1 G/DL (ref 6.3–8.2)
RBC # BLD AUTO: 5.76 M/UL (ref 4.23–5.6)
SODIUM SERPL-SCNC: 141 MMOL/L (ref 136–145)
TRIGL SERPL-MCNC: 107 MG/DL (ref 0–150)
TSH W FREE THYROID IF ABNORMAL: 1.52 UIU/ML (ref 0.27–4.2)
VLDLC SERPL CALC-MCNC: 21 MG/DL (ref 6–23)
WBC # BLD AUTO: 6.5 K/UL (ref 4.3–11.1)

## 2025-02-27 PROCEDURE — 1160F RVW MEDS BY RX/DR IN RCRD: CPT | Performed by: INTERNAL MEDICINE

## 2025-02-27 PROCEDURE — 1123F ACP DISCUSS/DSCN MKR DOCD: CPT | Performed by: INTERNAL MEDICINE

## 2025-02-27 PROCEDURE — G2211 COMPLEX E/M VISIT ADD ON: HCPCS | Performed by: INTERNAL MEDICINE

## 2025-02-27 PROCEDURE — 99214 OFFICE O/P EST MOD 30 MIN: CPT | Performed by: INTERNAL MEDICINE

## 2025-02-27 PROCEDURE — 3052F HG A1C>EQUAL 8.0%<EQUAL 9.0%: CPT | Performed by: INTERNAL MEDICINE

## 2025-02-27 PROCEDURE — 1159F MED LIST DOCD IN RCRD: CPT | Performed by: INTERNAL MEDICINE

## 2025-02-27 SDOH — ECONOMIC STABILITY: FOOD INSECURITY: WITHIN THE PAST 12 MONTHS, THE FOOD YOU BOUGHT JUST DIDN'T LAST AND YOU DIDN'T HAVE MONEY TO GET MORE.: NEVER TRUE

## 2025-02-27 SDOH — ECONOMIC STABILITY: FOOD INSECURITY: WITHIN THE PAST 12 MONTHS, YOU WORRIED THAT YOUR FOOD WOULD RUN OUT BEFORE YOU GOT MONEY TO BUY MORE.: NEVER TRUE

## 2025-02-27 ASSESSMENT — PATIENT HEALTH QUESTIONNAIRE - PHQ9
1. LITTLE INTEREST OR PLEASURE IN DOING THINGS: NOT AT ALL
SUM OF ALL RESPONSES TO PHQ QUESTIONS 1-9: 0
SUM OF ALL RESPONSES TO PHQ QUESTIONS 1-9: 0
2. FEELING DOWN, DEPRESSED OR HOPELESS: NOT AT ALL
SUM OF ALL RESPONSES TO PHQ QUESTIONS 1-9: 0
SUM OF ALL RESPONSES TO PHQ QUESTIONS 1-9: 0

## 2025-02-27 NOTE — PROGRESS NOTES
02/27/2025   Location:[facility] PHYSICIAN SERVICES  [facility] [place] MEDICINE  [place]  Patient #:  [id number]  YOB: 1955        History of Present Illness     Chief Complaint   Patient presents with    6 Month Follow-Up     CAD, diabetes,        Mr. Hernandez is a 69 y.o. male  who presents for Follow up on chronic medical issues. There is compliance and tolerance with medications.    Chronic active medical issues  include  DM, HLD, HTN, Pafib, on DOAC and CAD.  [facility]    He is on Ozempic along with Jardiance/Metformin combination pill for his diabetes.  HLD treated with Crestor.   HTN/CAD treated with norvasc      to 135.  Not exercising regularly.   Water intake about a quart or more.   Eye up to date has upcoming appt.   Has dentures. No gum issues.   Got up in the night for bathroom and fell backwards into the floor.  5 weeks ago., not sure what happened. Denies LOC or serious injury  Last Labs  CBC:   Lab Results   Component Value Date/Time    WBC 6.5 02/27/2025 09:34 AM    RBC 5.76 02/27/2025 09:34 AM    HGB 17.5 02/27/2025 09:34 AM    HCT 53.5 02/27/2025 09:34 AM    MCV 92.9 02/27/2025 09:34 AM    MCH 30.4 02/27/2025 09:34 AM    MCHC 32.7 02/27/2025 09:34 AM    RDW 14.6 02/27/2025 09:34 AM     02/27/2025 09:34 AM    MPV 10.6 02/27/2025 09:34 AM     CMP:    Lab Results   Component Value Date/Time     02/27/2025 09:34 AM    K 4.3 02/27/2025 09:34 AM     02/27/2025 09:34 AM    CO2 25 02/27/2025 09:34 AM    BUN 13 02/27/2025 09:34 AM    CREATININE 1.08 02/27/2025 09:34 AM    GFRAA 78 11/09/2021 08:47 AM    AGRATIO 1.9 11/09/2021 08:47 AM    LABGLOM 73 02/27/2025 09:34 AM    LABGLOM >60 08/16/2023 02:31 PM    LABGLOM 75 03/21/2022 08:43 AM    GLUCOSE 166 02/27/2025 09:34 AM    GLUCOSE 145 07/08/2024 10:01 AM    CALCIUM 9.3 02/27/2025 09:34 AM    BILITOT 0.5 02/27/2025 09:34 AM    ALKPHOS 53 02/27/2025 09:34 AM    ALKPHOS 58 07/08/2024

## 2025-03-06 ENCOUNTER — TELEPHONE (OUTPATIENT)
Dept: INTERNAL MEDICINE CLINIC | Facility: CLINIC | Age: 70
End: 2025-03-06

## 2025-03-06 NOTE — TELEPHONE ENCOUNTER
Diabetes control not at goal.   Recommend [facility] with [facility]. Consider increasing your Ozempic to 2mg weekly if you are tolerating it without significant nausea, vomiting, diarrhea,constipation or abdominal pain.  /gyhno  Avoid sweet/sugary foods and beverages. Limit carbohydrates  in your diet. Carbohydrates like bread, pasta, rice and white potatoes are broken down by the body into glucose which is sugar. Sugar is a source of energy. So the more active you are the more sugar is burned off. Aim for 150 minutes of aerobic exercise over the course of a week. Walking is great exercise.    I dont recll if we every discussed your seeing a blood specialist because of your elevated rbc and hgb. [facility].  As I recall you also quit smoking back in the 90s.

## 2025-07-29 ENCOUNTER — OFFICE VISIT (OUTPATIENT)
Age: 70
End: 2025-07-29
Payer: MEDICARE

## 2025-07-29 VITALS
HEIGHT: 73 IN | BODY MASS INDEX: 26.9 KG/M2 | DIASTOLIC BLOOD PRESSURE: 76 MMHG | SYSTOLIC BLOOD PRESSURE: 121 MMHG | HEART RATE: 73 BPM | WEIGHT: 203 LBS

## 2025-07-29 DIAGNOSIS — I25.10 CORONARY ARTERY DISEASE INVOLVING NATIVE CORONARY ARTERY OF NATIVE HEART WITHOUT ANGINA PECTORIS: Primary | ICD-10-CM

## 2025-07-29 DIAGNOSIS — E11.00 TYPE 2 DIABETES MELLITUS WITH HYPEROSMOLARITY WITHOUT COMA, WITHOUT LONG-TERM CURRENT USE OF INSULIN (HCC): ICD-10-CM

## 2025-07-29 DIAGNOSIS — I10 ESSENTIAL HYPERTENSION: ICD-10-CM

## 2025-07-29 DIAGNOSIS — E78.5 DYSLIPIDEMIA: ICD-10-CM

## 2025-07-29 PROCEDURE — 3074F SYST BP LT 130 MM HG: CPT | Performed by: INTERNAL MEDICINE

## 2025-07-29 PROCEDURE — 3052F HG A1C>EQUAL 8.0%<EQUAL 9.0%: CPT | Performed by: INTERNAL MEDICINE

## 2025-07-29 PROCEDURE — 1126F AMNT PAIN NOTED NONE PRSNT: CPT | Performed by: INTERNAL MEDICINE

## 2025-07-29 PROCEDURE — 1123F ACP DISCUSS/DSCN MKR DOCD: CPT | Performed by: INTERNAL MEDICINE

## 2025-07-29 PROCEDURE — 3078F DIAST BP <80 MM HG: CPT | Performed by: INTERNAL MEDICINE

## 2025-07-29 PROCEDURE — 99215 OFFICE O/P EST HI 40 MIN: CPT | Performed by: INTERNAL MEDICINE

## 2025-07-29 PROCEDURE — 93000 ELECTROCARDIOGRAM COMPLETE: CPT | Performed by: INTERNAL MEDICINE

## 2025-07-29 NOTE — PROGRESS NOTES
[facility] CARDIOLOGY, [address], [address]  [place] [address]  PHONE: [phone]    SUBJECTIVE: /HPI  Emanuel Del Hernandez (1955) is a 70 y.o. male seen for a follow up visit regarding the following:   Specialty Problems          Cardiology Problems    Mixed hyperlipidemia        CAD (coronary artery disease)         Patient states that he is feeling well since his last office visit. Denies chest pain, shortness of breath, pre syncope or syncope, palpitations.    HLD controlled on Crestor 20 mg. BP controlled today, 121/76.    Admits to pain with walking, leg swelling. High suspicion for PAD.    Known ROSLYN with CPAP intolerance, patient is on Semaglutide. Tolerates medication well without too many GI side effects.    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  Dose should be 2.5 mg twice a day    Past Medical History, Past Surgical History, Family history, Social History, and Medications were all reviewed with the patient today and updated as necessary.    Allergies   Allergen Reactions    Ticagrelor Other (See Comments)     Chest pain     Past Medical History:   Diagnosis Date    Acute prostatitis 3/11/2015    Bilateral pulmonary embolism (HCC)     CAD (coronary artery disease)     Calculus of kidney     Diabetes (HCC)     DVT of lower limb, acute (HCC) 6/9/2020    Esophageal reflux 3/11/2015    GERD (gastroesophageal reflux disease)     Hallux valgus (acquired)     Hematuria, unspecified     Impotence of organic origin 3/11/2015    Other and unspecified hyperlipidemia     Primary localized osteoarthrosis, unspecified site      Past Surgical History:   Procedure Laterality Date    CARDIAC CATHETERIZATION  02/18/2019    LV:EF=65%.LVEDP=21.LM:nl.LAD:patent stents.DX 1&Dx 2 are patent.LCX:patent stents.RCA:20-30% mid stenosis.Slow filling c/w endothelial dysfunction described.    CARDIAC PROCEDURE N/A 8/22/2024    Left heart cath / coronary angiography performed

## 2025-07-29 NOTE — TELEPHONE ENCOUNTER
Patient called and said the medication apixaban is [facility]. It was sent [facility] pharmacy. Please call the patient.

## 2025-08-29 ENCOUNTER — OFFICE VISIT (OUTPATIENT)
Dept: INTERNAL MEDICINE CLINIC | Facility: CLINIC | Age: 70
End: 2025-08-29

## 2025-08-29 VITALS
WEIGHT: 202 LBS | OXYGEN SATURATION: 95 % | DIASTOLIC BLOOD PRESSURE: 60 MMHG | HEART RATE: 95 BPM | RESPIRATION RATE: 18 BRPM | BODY MASS INDEX: 26.77 KG/M2 | TEMPERATURE: 97.4 F | HEIGHT: 73 IN | SYSTOLIC BLOOD PRESSURE: 94 MMHG

## 2025-08-29 DIAGNOSIS — E11.65 TYPE 2 DIABETES MELLITUS WITH HYPERGLYCEMIA, WITHOUT LONG-TERM CURRENT USE OF INSULIN (HCC): ICD-10-CM

## 2025-08-29 DIAGNOSIS — K21.9 GASTROESOPHAGEAL REFLUX DISEASE WITHOUT ESOPHAGITIS: ICD-10-CM

## 2025-08-29 DIAGNOSIS — Z00.00 INITIAL MEDICARE ANNUAL WELLNESS VISIT: Primary | ICD-10-CM

## 2025-08-29 DIAGNOSIS — E78.2 MIXED HYPERLIPIDEMIA: ICD-10-CM

## 2025-08-29 DIAGNOSIS — I25.118 CORONARY ARTERY DISEASE OF NATIVE ARTERY OF NATIVE HEART WITH STABLE ANGINA PECTORIS: ICD-10-CM

## 2025-08-29 ASSESSMENT — LIFESTYLE VARIABLES
HOW MANY STANDARD DRINKS CONTAINING ALCOHOL DO YOU HAVE ON A TYPICAL DAY: PATIENT DOES NOT DRINK
HOW OFTEN DO YOU HAVE A DRINK CONTAINING ALCOHOL: NEVER

## 2025-08-29 ASSESSMENT — PATIENT HEALTH QUESTIONNAIRE - PHQ9
1. LITTLE INTEREST OR PLEASURE IN DOING THINGS: NOT AT ALL
SUM OF ALL RESPONSES TO PHQ QUESTIONS 1-9: 0
2. FEELING DOWN, DEPRESSED OR HOPELESS: NOT AT ALL
SUM OF ALL RESPONSES TO PHQ QUESTIONS 1-9: 0

## (undated) DEVICE — CATHETER COR DIAG 4.0 5FR 110CM 2 SIDE H

## (undated) DEVICE — BAND COMPR L24CM REG CLR PLAS HEMSTAT EXT HK AND LOOP RETEN

## (undated) DEVICE — 260 CM J TIP WIRE .035

## (undated) DEVICE — CATHETER 5FR CORDIS PIG 145DEG 110CM

## (undated) DEVICE — GLIDESHEATH SLENDER STAINLESS STEEL KIT: Brand: GLIDESHEATH SLENDER